# Patient Record
Sex: FEMALE | Race: WHITE | NOT HISPANIC OR LATINO | Employment: FULL TIME
[De-identification: names, ages, dates, MRNs, and addresses within clinical notes are randomized per-mention and may not be internally consistent; named-entity substitution may affect disease eponyms.]

---

## 2015-12-08 LAB
ALT SERPL-CCNC: 37 IU/L (ref 6–31)
AST SERPL-CCNC: 21 IU/L (ref 10–40)
CREAT SERPL-MCNC: 0.81 MG/DL (ref 0.4–1)
GFR SERPL CREATININE-BSD FRML MDRD: >60 ML/MIN/1.73M*2
GLUCOSE SERPL-MCNC: 92 MG/DL (ref 70–100)
POTASSIUM SERPL-SCNC: 4 MEQ/L (ref 3.4–5.1)
TSH SERPL-ACNC: 1.98 (ref 0.4–3.99)

## 2017-06-17 ENCOUNTER — HEALTH MAINTENANCE LETTER (OUTPATIENT)
Age: 37
End: 2017-06-17

## 2018-06-23 ENCOUNTER — HEALTH MAINTENANCE LETTER (OUTPATIENT)
Age: 38
End: 2018-06-23

## 2018-10-26 LAB
ALT SERPL-CCNC: 47 IU/L (ref 6–31)
AST SERPL-CCNC: 28 IU/L (ref 10–40)
CREAT SERPL-MCNC: 0.84 MG/DL (ref 0.4–1)
GFR SERPL CREATININE-BSD FRML MDRD: >60 ML/MIN/1.73M*2
GLUCOSE SERPL-MCNC: 110 MG/DL (ref 70–99)
POTASSIUM SERPL-SCNC: 4.2 MEQ/L (ref 3.4–5.1)
TSH SERPL-ACNC: 2.49 UIU/ML (ref 0.4–3.99)

## 2019-10-01 ENCOUNTER — HEALTH MAINTENANCE LETTER (OUTPATIENT)
Age: 39
End: 2019-10-01

## 2020-07-09 ENCOUNTER — TRANSFERRED RECORDS (OUTPATIENT)
Dept: HEALTH INFORMATION MANAGEMENT | Facility: CLINIC | Age: 40
End: 2020-07-09

## 2020-09-29 NOTE — PROGRESS NOTES
"Subjective     Kayley Pak is a 40 year old female who presents to clinic today for the following health issues:    HPI     New Patient/Transfer of Care    Previous PCP: Dr. 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     She has 1 son, age 15.     She has a boyfriend.     She ran out of Propranolol 1 week ago. Her BP has been elevated since she ran out. She needs a refill today.     She is due for a fasting physical.     Concern - nexplanon implant   Onset: July 9 2020   Description: irregular menses. Heavy bleeding, has gotten golf ball size clots  Intensity: severe  Progression of Symptoms:  intermittent  Accompanying Signs & Symptoms: hasnt bled now for 2 days but it comes back on and off. Sometimes for 6 weeks . Also has cramping  Quite often   Previous history of similar problem: na   Precipitating factors:        Worsened by: na   Alleviating factors:        Improved by: na   Therapies tried and outcome:  iud for 5 years     She been having bilateral thumb pain. Index finger, and middle finger with numbness that started this past week.    Review of Systems   Constitutional, HEENT, cardiovascular, pulmonary, gi and gu systems are negative, except as otherwise noted. +numbness in bilateral thumbs, index, and middle fingers      Objective    BP (!) 152/108   Pulse 102   Temp 99.4  F (37.4  C)   Ht 1.521 m (4' 11.9\")   Wt 115 kg (253 lb 9.6 oz)   SpO2 99%   BMI 49.69 kg/m    Body mass index is 49.69 kg/m .     Physical Exam   GENERAL: healthy, alert and no distress  RESP: lungs clear to auscultation - no rales, rhonchi or wheezes  CV: regular rate and rhythm, normal S1 S2, no S3 or S4, no murmur, click or rub, no peripheral edema and peripheral pulses strong  MS: no gross musculoskeletal defects noted, no edema. CMS and ROM intact to bilateral upper extremities. Bilateral radial pulses +2. +phalen and tinel sign  SKIN: no suspicious lesions or " rashes  NEURO: Normal strength and tone, mentation intact and speech normal  PSYCH: mentation appears normal, affect normal/bright    Results for orders placed or performed in visit on 10/01/20 (from the past 24 hour(s))   CBC with platelets   Result Value Ref Range    WBC 8.0 4.0 - 11.0 10e9/L    RBC Count 4.88 3.8 - 5.2 10e12/L    Hemoglobin 12.9 11.7 - 15.7 g/dL    Hematocrit 44.3 35.0 - 47.0 %    MCV 91 78 - 100 fl    MCH 26.4 (L) 26.5 - 33.0 pg    MCHC 29.1 (L) 31.5 - 36.5 g/dL    RDW 17.2 (H) 10.0 - 15.0 %    Platelet Count 294 150 - 450 10e9/L           Assessment & Plan     Reviewed medical, surgical, and family history. Medications reviewed.     BP elevated, but she ran out of propranolol.       (I10) Essential hypertension  (primary encounter diagnosis)  Comment: RF  Plan: propranolol ER (INDERAL LA) 120 MG 24 hr         capsule            (Z13.9) Screening for condition  Comment: check labs. Genetic form signed  Plan: Factor 5 leiden mutation analysis            (N92.1) Menorrhagia with irregular cycle  Comment: check labs.   Plan: TSH with free T4 reflex, CBC with platelets,         Transferrin, Iron and iron binding capacity            (R53.83) Fatigue, unspecified type  Comment: add iron labs  Plan: CANCELED: Iron and iron binding capacity,         CANCELED: Transferrin            (G56.03) Bilateral carpal tunnel syndrome  Comment: bilateral wrist splints  Plan: Wrist/Arm/Hand Supplies Order for DME - ONLY         FOR DME            (Z76.89) Encounter to establish care  Comment: care established  Plan: as above         Tobacco Cessation:   reports that she has been smoking cigarettes. She started smoking about 21 years ago. She has been smoking about 0.50 packs per day. She has never used smokeless tobacco.  Tobacco Cessation Action Plan: Information offered: Patient not interested at this time      BMI:   Estimated body mass index is 49.69 kg/m  as calculated from the following:    Height as of this  "encounter: 1.521 m (4' 11.9\").    Weight as of this encounter: 115 kg (253 lb 9.6 oz).   Weight management plan: Discussed healthy diet and exercise guidelines         See Patient Instructions    Return in about 1 month (around 11/1/2020), or Fasting physical in 1 month.    Nayla Lund NP  [facility] - [place]    "

## 2020-10-01 ENCOUNTER — OFFICE VISIT (OUTPATIENT)
Dept: FAMILY MEDICINE | Facility: OTHER | Age: 40
End: 2020-10-01
Attending: NURSE PRACTITIONER
Payer: COMMERCIAL

## 2020-10-01 VITALS
TEMPERATURE: 99.4 F | HEIGHT: 60 IN | BODY MASS INDEX: 49.79 KG/M2 | OXYGEN SATURATION: 99 % | DIASTOLIC BLOOD PRESSURE: 108 MMHG | WEIGHT: 253.6 LBS | SYSTOLIC BLOOD PRESSURE: 152 MMHG | HEART RATE: 102 BPM

## 2020-10-01 DIAGNOSIS — I10 ESSENTIAL HYPERTENSION: Primary | ICD-10-CM

## 2020-10-01 DIAGNOSIS — G56.03 BILATERAL CARPAL TUNNEL SYNDROME: ICD-10-CM

## 2020-10-01 DIAGNOSIS — N92.1 MENORRHAGIA WITH IRREGULAR CYCLE: ICD-10-CM

## 2020-10-01 DIAGNOSIS — Z13.9 SCREENING FOR CONDITION: ICD-10-CM

## 2020-10-01 DIAGNOSIS — Z76.89 ENCOUNTER TO ESTABLISH CARE: ICD-10-CM

## 2020-10-01 DIAGNOSIS — R53.83 FATIGUE, UNSPECIFIED TYPE: ICD-10-CM

## 2020-10-01 PROBLEM — F41.0 PANIC ATTACK: Status: ACTIVE | Noted: 2017-12-22

## 2020-10-01 PROBLEM — D22.9 ATYPICAL NEVUS: Status: ACTIVE | Noted: 2017-12-22

## 2020-10-01 PROBLEM — L30.9 ECZEMA, UNSPECIFIED TYPE: Status: ACTIVE | Noted: 2017-12-22

## 2020-10-01 PROBLEM — D22.72: Status: ACTIVE | Noted: 2018-03-02

## 2020-10-01 PROBLEM — F41.1 GAD (GENERALIZED ANXIETY DISORDER): Status: ACTIVE | Noted: 2017-12-22

## 2020-10-01 LAB
ERYTHROCYTE [DISTWIDTH] IN BLOOD BY AUTOMATED COUNT: 17.2 % (ref 10–15)
HCT VFR BLD AUTO: 44.3 % (ref 35–47)
HGB BLD-MCNC: 12.9 G/DL (ref 11.7–15.7)
MCH RBC QN AUTO: 26.4 PG (ref 26.5–33)
MCHC RBC AUTO-ENTMCNC: 29.1 G/DL (ref 31.5–36.5)
MCV RBC AUTO: 91 FL (ref 78–100)
PLATELET # BLD AUTO: 294 10E9/L (ref 150–450)
RBC # BLD AUTO: 4.88 10E12/L (ref 3.8–5.2)
TSH SERPL DL<=0.005 MIU/L-ACNC: 1.49 MU/L (ref 0.4–4)
WBC # BLD AUTO: 8 10E9/L (ref 4–11)

## 2020-10-01 PROCEDURE — 36415 COLL VENOUS BLD VENIPUNCTURE: CPT | Performed by: NURSE PRACTITIONER

## 2020-10-01 PROCEDURE — 84443 ASSAY THYROID STIM HORMONE: CPT | Performed by: NURSE PRACTITIONER

## 2020-10-01 PROCEDURE — 81241 F5 GENE: CPT | Performed by: NURSE PRACTITIONER

## 2020-10-01 PROCEDURE — 85027 COMPLETE CBC AUTOMATED: CPT | Performed by: NURSE PRACTITIONER

## 2020-10-01 PROCEDURE — G0452 MOLECULAR PATHOLOGY INTERPR: HCPCS | Mod: 26 | Performed by: PATHOLOGY

## 2020-10-01 PROCEDURE — 99203 OFFICE O/P NEW LOW 30 MIN: CPT | Performed by: NURSE PRACTITIONER

## 2020-10-01 RX ORDER — DULOXETIN HYDROCHLORIDE 30 MG/1
CAPSULE, DELAYED RELEASE ORAL
COMMUNITY
Start: 2020-08-19 | End: 2021-06-21

## 2020-10-01 RX ORDER — PROPRANOLOL HYDROCHLORIDE 120 MG/1
CAPSULE, EXTENDED RELEASE ORAL
COMMUNITY
Start: 2020-08-21 | End: 2020-10-01

## 2020-10-01 RX ORDER — PLANT STANOL ESTER 450 MG
TABLET ORAL
COMMUNITY
End: 2021-08-25

## 2020-10-01 RX ORDER — PROPRANOLOL HYDROCHLORIDE 120 MG/1
120 CAPSULE, EXTENDED RELEASE ORAL DAILY
Qty: 90 CAPSULE | Refills: 0 | Status: SHIPPED | OUTPATIENT
Start: 2020-10-01 | End: 2021-01-06

## 2020-10-01 RX ORDER — FERROUS SULFATE 325(65) MG
325 TABLET ORAL
COMMUNITY
End: 2021-08-25

## 2020-10-01 ASSESSMENT — ANXIETY QUESTIONNAIRES
GAD7 TOTAL SCORE: 4
2. NOT BEING ABLE TO STOP OR CONTROL WORRYING: NOT AT ALL
IF YOU CHECKED OFF ANY PROBLEMS ON THIS QUESTIONNAIRE, HOW DIFFICULT HAVE THESE PROBLEMS MADE IT FOR YOU TO DO YOUR WORK, TAKE CARE OF THINGS AT HOME, OR GET ALONG WITH OTHER PEOPLE: VERY DIFFICULT
7. FEELING AFRAID AS IF SOMETHING AWFUL MIGHT HAPPEN: NOT AT ALL
5. BEING SO RESTLESS THAT IT IS HARD TO SIT STILL: NOT AT ALL
6. BECOMING EASILY ANNOYED OR IRRITABLE: NOT AT ALL
3. WORRYING TOO MUCH ABOUT DIFFERENT THINGS: NOT AT ALL
1. FEELING NERVOUS, ANXIOUS, OR ON EDGE: MORE THAN HALF THE DAYS

## 2020-10-01 ASSESSMENT — MIFFLIN-ST. JEOR: SCORE: 1740.23

## 2020-10-01 ASSESSMENT — PATIENT HEALTH QUESTIONNAIRE - PHQ9
SUM OF ALL RESPONSES TO PHQ QUESTIONS 1-9: 4
5. POOR APPETITE OR OVEREATING: MORE THAN HALF THE DAYS

## 2020-10-01 ASSESSMENT — PAIN SCALES - GENERAL: PAINLEVEL: NO PAIN (0)

## 2020-10-01 NOTE — NURSING NOTE
"Chief Complaint   Patient presents with     Establish Care       Initial BP (!) 164/108   Pulse 102   Temp 99.4  F (37.4  C)   Ht 1.521 m (4' 11.9\")   Wt 115 kg (253 lb 9.6 oz)   SpO2 99%   BMI 49.69 kg/m   Estimated body mass index is 49.69 kg/m  as calculated from the following:    Height as of this encounter: 1.521 m (4' 11.9\").    Weight as of this encounter: 115 kg (253 lb 9.6 oz).  Medication Reconciliation: complete  Caty Baer  "

## 2020-10-01 NOTE — LETTER
October 9, 2020      Kayley Pak  [address]  [place] [address]        Dear ,    We are writing to inform you of your test results.    Your test results fall within the expected range(s) or remain unchanged from previous results.  Please continue with current treatment plan.    Resulted Orders   TSH with free T4 reflex   Result Value Ref Range    TSH 1.49 0.40 - 4.00 mU/L   Factor 5 leiden mutation analysis   Result Value Ref Range    Copath Report       Patient Name: KAYLEY PAK  MR#: [id number]  Specimen #: [id number]  Collected: 10/1/2020 11:10  Received: 10/2/2020 09:27  Reported: 10/5/2020 17:18  Ordering Phy(s): ISABELL CASTRO  Additional Phy(s): KENDRA CINTRON    For improved result formatting, select 'View Enhanced Report Format' under   Linked Documents section.  _________________________________________    TEST(S) REQUESTED:  Factor 5 Leiden Mutation by PCR    SPECIMEN DESCRIPTION:  BLOOD    METHODOLOGY:   The regions of genomic DNA containing the W2995H Factor V   Leiden gene mutation (Factor V  Leiden) and the Factor 2(Prothrombin [id number]) gene mutation were   simultaneously amplified using the polymerase  chain reaction.  The amplified products were digested with restriction   endonuclease TaqI and products were  analyzed by gel electrophoresis.    RESULTS:    Factor V 1691G>A (Leiden)  RESULTS:  Mutation analyzed:     1691G>A  Factor V 1691G>A (Leiden)  Interpretation:      ABSENT  Factor V 1691G>A (Leiden) mutat ion  genotype:      G/G    INTERPRETATION:  The patient is negative for the Factor V 1691G>A (Leiden) mutation.    COMMENTS:  If a patient is the recipient of an allogeneic bone marrow transplant,   this test must be done on a  pre-transplant sample or buccal swab.  A previous allogeneic bone marrow   transplant will interfere with test  results.  Call the [facility]([phone]) for   instructions on sample collection for these  patients.    This  test was developed and its performance characteristics determined by   [facility]. It has not been cleared or approved by the FDA.   The laboratory is regulated under CLIA  as qualified to perform high-complexity testing. This test is used for   clinical purposes. It should not be  regarded as investigational or for research.    A resident/fellow in an accredited training program was involved in the   selection of testing, review of  laboratory data, and/or interpretation of this case.   I, as the senior   physician, attest that I: (i) confirmed  appropriate testing, (ii) examined the relevant raw data for the   specimen(s); and (iii) rendered or confirmed  the interpretation(s).    Electronically Signed Out By:  Travis Gilliland M.D., [facility]    CPT Codes:  A: 25112-L9JZI, -LSQXPH    TESTING LAB LOCATION:  [address] [address] [phone]    COLLECTION SITE:  Client:  [facility]  Location:  [facility] (B)     CBC with platelets   Result Value Ref Range    WBC 8.0 4.0 - 11.0 10e9/L    RBC Count 4.88 3.8 - 5.2 10e12/L    Hemoglobin 12.9 11.7 - 15.7 g/dL    Hematocrit 44.3 35.0 - 47.0 %    MCV 91 78 - 100 fl    MCH 26.4 (L) 26.5 - 33.0 pg    MCHC 29.1 (L) 31.5 - 36.5 g/dL    RDW 17.2 (H) 10.0 - 15.0 %    Platelet Count 294 150 - 450 10e9/L       If you have any questions or concerns, please call the clinic at the number listed above.       Sincerely,        Nayla Lund NP

## 2020-10-02 ASSESSMENT — ANXIETY QUESTIONNAIRES: GAD7 TOTAL SCORE: 4

## 2020-10-05 LAB — COPATH REPORT: NORMAL

## 2020-11-12 ENCOUNTER — OFFICE VISIT (OUTPATIENT)
Dept: OBGYN | Facility: OTHER | Age: 40
End: 2020-11-12
Attending: OBSTETRICS & GYNECOLOGY
Payer: COMMERCIAL

## 2020-11-12 VITALS
SYSTOLIC BLOOD PRESSURE: 136 MMHG | OXYGEN SATURATION: 98 % | DIASTOLIC BLOOD PRESSURE: 74 MMHG | HEART RATE: 89 BPM | WEIGHT: 253 LBS | BODY MASS INDEX: 49.67 KG/M2 | HEIGHT: 60 IN

## 2020-11-12 DIAGNOSIS — Z12.4 SCREENING FOR CERVICAL CANCER: Primary | ICD-10-CM

## 2020-11-12 DIAGNOSIS — N93.9 ABNORMAL UTERINE BLEEDING: Primary | ICD-10-CM

## 2020-11-12 DIAGNOSIS — N93.9 ABNORMAL UTERINE BLEEDING (AUB): Primary | ICD-10-CM

## 2020-11-12 DIAGNOSIS — E66.01 MORBID OBESITY (H): ICD-10-CM

## 2020-11-12 DIAGNOSIS — N93.9 ABNORMAL UTERINE BLEEDING (AUB): ICD-10-CM

## 2020-11-12 DIAGNOSIS — N93.9 ABNORMAL UTERINE BLEEDING: ICD-10-CM

## 2020-11-12 LAB
ERYTHROCYTE [DISTWIDTH] IN BLOOD BY AUTOMATED COUNT: 15.6 % (ref 10–15)
HCT VFR BLD AUTO: 32 % (ref 35–47)
HGB BLD-MCNC: 10.1 G/DL (ref 11.7–15.7)
MCH RBC QN AUTO: 26.7 PG (ref 26.5–33)
MCHC RBC AUTO-ENTMCNC: 31.6 G/DL (ref 31.5–36.5)
MCV RBC AUTO: 85 FL (ref 78–100)
PLATELET # BLD AUTO: 384 10E9/L (ref 150–450)
RBC # BLD AUTO: 3.78 10E12/L (ref 3.8–5.2)
WBC # BLD AUTO: 9.5 10E9/L (ref 4–11)

## 2020-11-12 PROCEDURE — 83001 ASSAY OF GONADOTROPIN (FSH): CPT | Performed by: OBSTETRICS & GYNECOLOGY

## 2020-11-12 PROCEDURE — 85027 COMPLETE CBC AUTOMATED: CPT | Performed by: OBSTETRICS & GYNECOLOGY

## 2020-11-12 PROCEDURE — 99203 OFFICE O/P NEW LOW 30 MIN: CPT | Performed by: OBSTETRICS & GYNECOLOGY

## 2020-11-12 PROCEDURE — 83002 ASSAY OF GONADOTROPIN (LH): CPT | Performed by: OBSTETRICS & GYNECOLOGY

## 2020-11-12 PROCEDURE — 36415 COLL VENOUS BLD VENIPUNCTURE: CPT | Performed by: OBSTETRICS & GYNECOLOGY

## 2020-11-12 PROCEDURE — 87624 HPV HI-RISK TYP POOLED RSLT: CPT | Performed by: OBSTETRICS & GYNECOLOGY

## 2020-11-12 PROCEDURE — G0123 SCREEN CERV/VAG THIN LAYER: HCPCS | Performed by: OBSTETRICS & GYNECOLOGY

## 2020-11-12 RX ORDER — MEDROXYPROGESTERONE ACETATE 10 MG
10 TABLET ORAL 2 TIMES DAILY
Qty: 60 TABLET | Refills: 0 | Status: SHIPPED | OUTPATIENT
Start: 2020-11-12 | End: 2020-12-12

## 2020-11-12 ASSESSMENT — PAIN SCALES - GENERAL: PAINLEVEL: SEVERE PAIN (7)

## 2020-11-12 ASSESSMENT — MIFFLIN-ST. JEOR: SCORE: 1737.51

## 2020-11-12 NOTE — PROGRESS NOTES
"OUTPATIENT VISIT        CC: ABNORMAL UTERINE BLEEDING FOR 3 WEEKS; NEXPLANON IN PLACE.    HPI;       40  Y.O G 1 P  1001  LMP= 10-  CYCLE HX= MENARCHY AT  12/ IRREGULAR WITHOUT OCP USE/ LMP SEE ABOVE  PATIENT PRESENTS FOR COMPLAINT OF     SHE REPORTS CRAMPING AND 6 PADS DAILY FOR THE LAST 2 1/2 WEEKS  SHE DENIES  FEVER, CHILLS, NAUSEA, EMESIS, DIARRHEA, SOB OR DIZZINESS.  \SHE FEELS TIRED AND RESTARTED HER FE PILLS DUE TO THE LONG CYCLE FLOW.    SHE DENIES TRAVEL OVERSEAS OR TO COVID ENDEMIC REGIONS.  SHE DENIES SYMPTOMS OF PNEUMONIA OR ANOSMIA.    SHE SEES   A PCP AND HAS NEVER HAD AN IRREGULAR PAP SMEAR- HER LAST PAP WAS 8 YRS AGO               OB HX= NVD X1    MED HX= DEPRESSION    HTN    ABNORMAL BLEEDING    TOBACCO USER    OBESE    SURG HX= T&A    KIDNEY STONE    D&C     SOC HX= DAILY TOBACCO USER    FAM HX= HTN AND DM+    ALLERGY= AMOX    SULFA    MEDS= NEXPLANON   PROPRANOLOL   CYMBALTA   FE   K+        ROS= NEG EXCEPT FOR HPI.      PE=  Blood pressure 136/74, pulse 89, height 1.521 m (4' 11.9\"), weight 114.8 kg (253 lb), SpO2 98 %.    AWAKE AND ALERT  \NCAT  HEENT: PERRLA, EOMI  NECK= NO LAD/TM  HEART= RRR NORMAL S1S2  BACK - NO CVA TENDERNESS  LUNGS= CTA BILATERALLY NO WHEEZING OR CRACKLES  ABD= SOFT, APPROP, BS+   NO HS MEGALLY   NONTENDER  = NORMAL EXTERNAL FEMALE GENITALIA   VAG- WELL RUGATED  CX= MULTIPAROUS- SMALL AMOUNT OF BLOOD NOTED, NO ACTIVE BLEEDING OR CLOTS   UT= SMALL MOBLIE - DIFFICULT EXAM DUE TO  HABITUS [BMI>30]   NO OBVIOUS ADNEXAL FULLNESS OR TENDERNESS  PAP COMPLETED  DTR= 1/4 AND EQUAL  EXT=- NO CLUBBING         ASSESSMENT= 40 y.o G 1  LMP 10- WITH ABNORMAL UTERINE BLEEDING- NEXPLANON IN PLACE, OBESITY, NO PAP FOR 8 YEARS.    1. OBESE- EXERCISE COUNSELING   2. AUB- PELVIC USG, FSH, LH, HCG, CBC - START PROVERA 10 PO BID X 60.  3. RTC IN 4 WEEKS FOR IUD PLACEMENT IF NORMAL.  4. CONTINUE FEOSOL   5. PAP COMPLETED.  6. POSSIBLE EM BX IF THICKENED STRIPE.  "

## 2020-11-12 NOTE — NURSING NOTE
"Chief Complaint   Patient presents with     Vaginal Bleeding       Initial /74 (BP Location: Left arm, Patient Position: Sitting, Cuff Size: Adult Large)   Pulse 89   Ht 1.521 m (4' 11.9\")   Wt 114.8 kg (253 lb)   SpO2 98%   BMI 49.58 kg/m   Estimated body mass index is 49.58 kg/m  as calculated from the following:    Height as of this encounter: 1.521 m (4' 11.9\").    Weight as of this encounter: 114.8 kg (253 lb).  Medication Reconciliation: complete     Ana Maria Marshall LPN    "

## 2020-11-13 LAB
FSH SERPL-ACNC: 9.8 IU/L
LH SERPL-ACNC: 5.7 IU/L

## 2020-11-18 ENCOUNTER — HOSPITAL ENCOUNTER (OUTPATIENT)
Dept: ULTRASOUND IMAGING | Facility: HOSPITAL | Age: 40
Discharge: HOME | End: 2020-11-18
Attending: OBSTETRICS & GYNECOLOGY | Admitting: OBSTETRICS & GYNECOLOGY
Payer: COMMERCIAL

## 2020-11-18 DIAGNOSIS — N93.9 ABNORMAL UTERINE BLEEDING: ICD-10-CM

## 2020-11-18 PROCEDURE — 76856 US EXAM PELVIC COMPLETE: CPT

## 2020-11-19 ENCOUNTER — TELEPHONE (OUTPATIENT)
Dept: FAMILY MEDICINE | Facility: OTHER | Age: 40
End: 2020-11-19

## 2020-11-19 LAB
COPATH REPORT: ABNORMAL
PAP: ABNORMAL

## 2020-11-19 NOTE — TELEPHONE ENCOUNTER
Patient is calling looking for lab results from 11/12.  Labs have not been read yet by provider.  Please call her back when results are read.  [phone]

## 2020-11-20 ENCOUNTER — TELEPHONE (OUTPATIENT)
Dept: OBGYN | Facility: OTHER | Age: 40
End: 2020-11-20

## 2020-11-20 LAB
FINAL DIAGNOSIS: NORMAL
HPV HR 12 DNA CVX QL NAA+PROBE: NEGATIVE
HPV16 DNA SPEC QL NAA+PROBE: NEGATIVE
HPV18 DNA SPEC QL NAA+PROBE: NEGATIVE
SPECIMEN DESCRIPTION: NORMAL
SPECIMEN SOURCE CVX/VAG CYTO: NORMAL

## 2020-11-20 NOTE — TELEPHONE ENCOUNTER
Patient saw Dr Gatica on 11/12/2020 and had some lab work done. She is looking for the results. Can you please review results or call her and give them to her? Thank you.

## 2020-11-20 NOTE — PROGRESS NOTES
Called patient about her lab results.  I told her the results including the US.  EC is 3mm, but probable fibroid adjacent to lining.      Explained that she needs to     Take her medication as ordered which is Provera 10mg BID  Take her iron as ordered.    Needs Colposcopy examination for ASCUS.

## 2020-12-28 ENCOUNTER — TELEPHONE (OUTPATIENT)
Dept: FAMILY MEDICINE | Facility: OTHER | Age: 40
End: 2020-12-28

## 2020-12-28 NOTE — TELEPHONE ENCOUNTER
Called and left patient message about some fmla forms that were in [place] mail box at the [facility]. Unsure of what condition she is asking for FMLA for and for how long , ect.  She will probably need an appointment to be seen since she has only seen provider for an est. Care apt. On 10/1/2020.     KEELY Byrne  Awaiting call back. Will also try to reach her via [employer].

## 2020-12-29 NOTE — TELEPHONE ENCOUNTER
Patient needs to be seen for her forms to be filled out for work. Please try to reach her to have her scheduled to be seen by pcp.     KEELY Byrne

## 2020-12-29 NOTE — TELEPHONE ENCOUNTER
Called 12/29/20  to schedule appointment with PCP Nayla Lund NP to fill out forms for work  Aliza Hill

## 2021-01-06 ENCOUNTER — TELEPHONE (OUTPATIENT)
Dept: FAMILY MEDICINE | Facility: OTHER | Age: 41
End: 2021-01-06

## 2021-01-06 DIAGNOSIS — I10 ESSENTIAL HYPERTENSION: ICD-10-CM

## 2021-01-06 RX ORDER — MEDROXYPROGESTERONE ACETATE 150 MG/ML
150 INJECTION, SUSPENSION INTRAMUSCULAR
Status: CANCELLED | OUTPATIENT
Start: 2021-01-06 | End: 2022-01-01

## 2021-01-06 RX ORDER — PROPRANOLOL HYDROCHLORIDE 120 MG/1
CAPSULE, EXTENDED RELEASE ORAL
Qty: 90 CAPSULE | Refills: 0 | Status: SHIPPED | OUTPATIENT
Start: 2021-01-06 | End: 2021-04-07

## 2021-01-06 NOTE — TELEPHONE ENCOUNTER
Patient left a message on my phone about fmla forms . I do have the forms she stats they are about conditions that she seen obgyn for . And other medical issues.  She needs refills on her proprolnolol, and metroxaprogesterone.     I called patient back , no answer . Left her a voicemail stating she NEEDs an apt in order for these forms to be filled out or she may even need the OBGYN to fill them out for her since pcp has not seen her for these issues. Also let her know I send the requests for refills to pcp . Unsure if she will fill the metroxaprogesterone because she has  Not seen her for this or filled before. Left her the scheduling number to schedule apt.     KEELY Byrne

## 2021-01-13 DIAGNOSIS — N93.9 ABNORMAL UTERINE BLEEDING (AUB): Primary | ICD-10-CM

## 2021-01-13 RX ORDER — MEDROXYPROGESTERONE ACETATE 10 MG
10 TABLET ORAL DAILY
Qty: 60 TABLET | Refills: 0 | Status: SHIPPED | OUTPATIENT
Start: 2021-01-13 | End: 2021-04-07

## 2021-01-13 NOTE — TELEPHONE ENCOUNTER
Provera       Last Written Prescription Date:  11/12/2020  Last Fill Quantity: 60,   # refills: 0  Last Office Visit: 11/12/2020  Future Office visit:    Next 5 appointments (look out 90 days)    Jan 29, 2021 10:15 AM  (Arrive by 10:00 AM)  SHORT with Nayla Lund NP  [facility] [address]  [facility] [place] [address]  [phone]

## 2021-01-15 ENCOUNTER — HEALTH MAINTENANCE LETTER (OUTPATIENT)
Age: 41
End: 2021-01-15

## 2021-04-05 NOTE — PROGRESS NOTES
"    Assessment & Plan       (I10) Essential hypertension  Comment: increase propranolol to 160 mg and recheck back in 1 month  Plan: propranolol ER (INDERAL LA) 160 MG 24 hr         capsule            (N93.9) Abnormal uterine bleeding (AUB)  Comment: RF  Plan: medroxyPROGESTERone (PROVERA) 10 MG tablet               BMI:   Estimated body mass index is 52.3 kg/m  as calculated from the following:    Height as of 11/12/20: 1.521 m (4' 11.9\").    Weight as of this encounter: 121.1 kg (266 lb 14.4 oz).   Weight management plan: Discussed healthy diet and exercise guidelines    See Patient Instructions    Return in about 1 month (around 5/7/2021) for Fasting physical and BP check.    Nayla Lund NP  [facility]   Kayley is a 40 year old who presents for the following health issues     HPI     Hypertension Follow-up      Do you check your blood pressure regularly outside of the clinic? No     Are you following a low salt diet? No    Are your blood pressures ever more than 140 on the top number (systolic) OR more   than 90 on the bottom number (diastolic), for example 140/90? Yes      How many servings of fruits and vegetables do you eat daily?  2-3    On average, how many sweetened beverages do you drink each day (Examples: soda, juice, sweet tea, etc.  Do NOT count diet or artificially sweetened beverages)?   2    How many days per week do you exercise enough to make your heart beat faster? 3 or less    How many minutes a day do you exercise enough to make your heart beat faster? 9 or less    How many days per week do you miss taking your medication? 0    Medication Followup of propranolol  MG    Taking Medication as prescribed: yes    Side Effects:  None    Medication Helping Symptoms: Yes, but might consider something different ??       Review of Systems   Constitutional, HEENT, cardiovascular, pulmonary, gi and gu systems are negative, except as otherwise noted.    "   Objective    BP (!) 160/100 (BP Location: Right arm, Patient Position: Chair, Cuff Size: Adult Large)   Pulse 99   Temp 97.6  F (36.4  C) (Tympanic)   Resp 16   Wt 121.1 kg (266 lb 14.4 oz)   SpO2 97%   BMI 52.30 kg/m    Body mass index is 52.3 kg/m . BP recheck 148/98 left arm (she hasn't taken her medication yet today)  Physical Exam   GENERAL: healthy, alert and no distress  NECK: no adenopathy, no asymmetry, masses, or scars and thyroid normal to palpation  RESP: lungs clear to auscultation - no rales, rhonchi or wheezes  CV: regular rate and rhythm, normal S1 S2, no S3 or S4, no murmur, click or rub, no peripheral edema and peripheral pulses strong  MS: no gross musculoskeletal defects noted, no edema  SKIN: no suspicious lesions or rashes  PSYCH: mentation appears normal, affect normal/bright

## 2021-04-07 ENCOUNTER — OFFICE VISIT (OUTPATIENT)
Dept: FAMILY MEDICINE | Facility: OTHER | Age: 41
End: 2021-04-07
Attending: NURSE PRACTITIONER
Payer: COMMERCIAL

## 2021-04-07 VITALS
WEIGHT: 266.9 LBS | BODY MASS INDEX: 52.3 KG/M2 | RESPIRATION RATE: 16 BRPM | HEART RATE: 99 BPM | SYSTOLIC BLOOD PRESSURE: 160 MMHG | DIASTOLIC BLOOD PRESSURE: 100 MMHG | TEMPERATURE: 97.6 F | OXYGEN SATURATION: 97 %

## 2021-04-07 DIAGNOSIS — I10 ESSENTIAL HYPERTENSION: ICD-10-CM

## 2021-04-07 DIAGNOSIS — N93.9 ABNORMAL UTERINE BLEEDING (AUB): ICD-10-CM

## 2021-04-07 PROCEDURE — 99213 OFFICE O/P EST LOW 20 MIN: CPT | Performed by: NURSE PRACTITIONER

## 2021-04-07 RX ORDER — PROPRANOLOL HYDROCHLORIDE 160 MG/1
160 CAPSULE, EXTENDED RELEASE ORAL DAILY
Qty: 90 CAPSULE | Refills: 0 | Status: SHIPPED | OUTPATIENT
Start: 2021-04-07 | End: 2021-07-07

## 2021-04-07 RX ORDER — MEDROXYPROGESTERONE ACETATE 10 MG
10 TABLET ORAL DAILY
Qty: 90 TABLET | Refills: 0 | Status: SHIPPED | OUTPATIENT
Start: 2021-04-07 | End: 2021-07-07

## 2021-04-07 ASSESSMENT — PAIN SCALES - GENERAL: PAINLEVEL: NO PAIN (0)

## 2021-04-07 NOTE — NURSING NOTE
"Chief Complaint   Patient presents with     Hypertension     Recheck Medication       Initial BP (!) 160/100 (BP Location: Right arm, Patient Position: Chair, Cuff Size: Adult Large)   Pulse 99   Temp 97.6  F (36.4  C) (Tympanic)   Resp 16   Wt 121.1 kg (266 lb 14.4 oz)   SpO2 97%   BMI 52.30 kg/m   Estimated body mass index is 52.3 kg/m  as calculated from the following:    Height as of 11/12/20: 1.521 m (4' 11.9\").    Weight as of this encounter: 121.1 kg (266 lb 14.4 oz).  Medication Reconciliation: complete  Karishma Vargas LPN  "

## 2021-04-07 NOTE — PATIENT INSTRUCTIONS
Patient Education     Established High Blood Pressure    High blood pressure (hypertension) is a long-term (chronic) disease. Often healthcare providers don t know what causes it. But it can be caused by certain health conditions and medicines.  If you have high blood pressure, you may not have any symptoms. If you do have symptoms, they may include:    Headache    Dizziness    Changes in your vision    Chest pain    Shortness of breath  But even without symptoms, high blood pressure that s not treated raises your risk for heart attack, heart failure, kidney disease, and stroke. High blood pressure is a serious health risk and shouldn t be ignored.  Blood pressure measurements are given as 2 numbers. Systolic blood pressure is the upper number. This is the pressure when the heart contracts. Diastolic blood pressure is the lower number. This is the pressure when the heart relaxes between beats. You will see your blood pressure readings written together. For example, a person with a systolic pressure of 118 and a diastolic pressure of 78 will have 118/78 written in the medical record.  Blood pressure is classified as normal, raised (elevated) or stage 1 or stage 2 high blood pressure:    Normal blood pressure. Systolic of less than 120 and diastolic of less than 80 (120/80).    Elevated blood pressure. Systolic of 120 to 129 and diastolic less than 80.    Stage 1 high blood pressure. Systolic is 130 to 139 or diastolic between 80 to 89.    Stage 2 high blood pressure. Systolic is 140 or higher or the diastolic is 90 or higher.  Home care  If you have high blood pressure, follow these home care guidelines to help lower your blood pressure. If you are taking medicines for high blood pressure, these methods may reduce or end your need for medicines in the future.    Start a weight-loss program if you are overweight.    Cut back on how much salt you get in your diet. Here s how to do this:  ? Don t eat foods that have a  lot of salt. These include olives, pickles, smoked meats, and salted potato chips.  ? Don t add salt to your food at the table.  ? Use only small amounts of salt when cooking.    Start an exercise program. Talk with your healthcare provider about the type of exercise program that would be best for you. It doesn't have to be hard. Even brisk walking for 20 minutes 3 times a week is a good form of exercise.    Don t take medicines that stimulate the heart. This includes many over-the-counter cold and sinus decongestant pills and sprays, as well as diet pills. Check the warnings about high blood pressure on the label. Before buying any over-the-counter medicines or supplements, always ask the pharmacist about the product's possible interaction with your high blood pressure and your high blood pressure medicines.    Stimulants such as amphetamine or cocaine could be deadly for someone with high blood pressure. Never take these.    Limit how much caffeine you get in your diet. Switch to caffeine-free products.    Stop smoking. If you are a long-time smoker, this can be hard. Talk with your healthcare provider about medicines and nicotine replacement options to help you. Also join a stop-smoking program . This makes it more likely that you will quit for good.    Learn how to handle stress. This is an important part of any program to lower blood pressure. Learn about relaxation methods such as meditation, yoga, or biofeedback.    If your provider prescribed medicines, take them exactly as directed. Missing doses may cause your blood pressure get out of control.    If you miss a dose, check with your healthcare provider or pharmacist about what to do.    Think about buying an automatic blood pressure machine to check your blood pressure at home. Ask your provider for a recommendation. You can get one of these at most pharmacies.  Using a home blood pressure monitor  The American Heart Association advises the following  guidelines for home blood pressure monitoring:    Don't smoke or drink coffee for 30 minutes before taking your blood pressure.    Go to the bathroom before the test.    Relax for 5 minutes before taking the measurement.    Sit with your back supported (don't sit on a couch or soft chair). Keep your feet on the floor uncrossed. Place your arm on a solid flat surface (such as a table) with the upper part of the arm at heart level. Place the middle of the cuff directly above the bend of the elbow. Check the monitor's instruction manual for an illustration.    Take multiple readings. When you measure, take 2 to 3 readings one minute apart and record all of the results.    Take your blood pressure at the same time every day, or as your healthcare provider advises.    Record the date, time, and blood pressure reading.    Take the record with you to your next healthcare appointment. If your blood pressure monitor has a built-in memory, just take the monitor with you to your next appointment.    Call your provider if you have several high readings. Don't be frightened by one high blood pressure reading. But if you get a few high readings, check in with your healthcare provider.  Follow-up care  You will need to see your healthcare provider regularly. This is to check your blood pressure and to make changes to your medicines. Make a follow-up appointment as directed. Bring the record of your home blood pressure readings to the appointment.  Call 911  Call 911 if you have any of these:    Blood pressure of 180/120 or higher    Chest pain or shortness of breath    Weakness of an arm or leg or one side of the face    Problems speaking or seeing     When to get medical advice  Call your healthcare provider right away if any of these occur:    Severe headache    Throbbing or rushing sound in the ears    Nosebleed    Sudden severe pain in your belly (abdomen)    Extreme drowsiness, confusion, or fainting    Dizziness or spinning  feeling (vertigo)  [employer] last reviewed this educational content on 7/1/2019 2000-2020 [employer]. All rights reserved. This information is not intended as a substitute for professional medical care. Always follow your healthcare professional's instructions.    Increase Propranolol to 160 mg daily.   Follow up in 1 month.

## 2021-06-20 NOTE — PROGRESS NOTES
Assessment & Plan        Panic attack  - DULoxetine (CYMBALTA) 60 MG capsule; Take 1 capsule (60 mg) by mouth daily    LISA (generalized anxiety disorder)  - DULoxetine (CYMBALTA) 60 MG capsule; Take 1 capsule (60 mg) by mouth daily    Essential hypertension  - Continue Proprranolol  - losartan (COZAAR) 50 MG tablet; Take 1 tablet (50 mg) by mouth daily    Tobacco abuse  - nicotine (NICODERM CQ) 21 MG/24HR 24 hr patch; Place 1 patch onto the skin every 24 hours  - nicotine (NICODERM CQ) 14 MG/24HR 24 hr patch; Place 1 patch onto the skin every 24 hours  - nicotine (NICODERM CQ) 7 MG/24HR 24 hr patch; Place 1 patch onto the skin every 24 hours      Return 1 month FU with her PCP - panic attacks, anxiety, HTN.      Smiley Rubio, [facility] - [place] Z[place]ar is a 40 year old who presents for the following health issues         Anxiety - Panic attack Follow-Up    How are you doing with your anxiety since your last visit? Worsened     Are you having other symptoms that might be associated with anxiety? Yes:  states has been having panic attacks     Have you had a significant life event? No     Are you feeling depressed? Yes:  see flowsheet    Do you have any concerns with your use of alcohol or other drugs? No         First panic attack was 16 years ago. She has had them ever since. She had good stretches without needing medication, then the attacks have returned 5 years ago. She was started on medication about 5 years ago and she has been okay until recently. She has tried a couple different medications. Cymbalta has worked the best because she has the least side effects. In the last 5 months she has been having panic attacks a couple times a week. She misses work related to these panic attacks. Patient denies any major life changes. She lives with her boyfriend and son, patient reports no problems. Patient is safe in her relationships.         Social History     Tobacco Use     Smoking status:  Current Every Day Smoker     Packs/day: 0.50     Types: Cigarettes     Start date: 1999     Smokeless tobacco: Never Used     Tobacco comment: half a pack a day    Substance Use Topics     Alcohol use: Yes     Comment: once a month     Drug use: No         LISA-7 SCORE 10/1/2020 6/21/2021   Total Score 4 13       PHQ 10/1/2020 6/21/2021   PHQ-9 Total Score 4 12   Q9: Thoughts of better off dead/self-harm past 2 weeks Not at all Not at all         Patient Active Problem List   Diagnosis     Family history of diabetes mellitus     Seasonal allergic rhinitis     CARDIOVASCULAR SCREENING; LDL GOAL LESS THAN 160     Dyslipidemia     Atypical nevus     Atypical nevus of foot, left     Contraceptive management     Eczema, unspecified type     Essential hypertension     LISA (generalized anxiety disorder)     Herpesvirus 2     Panic attack     Pes anserine bursitis     Right knee pain     Morbid obesity (H)     Tobacco abuse     Past Surgical History:   Procedure Laterality Date     IR RENAL STONE REMOVAL RIGHT  2010     miscarriage  1999    Age 19 D & C.     mole removed  2019    Removed in between toes on right foot betwwen 2nd and 3rd toe     TONSILLECTOMY, ADENOIDECTOMY, COMBINED  1993       Social History     Tobacco Use     Smoking status: Current Every Day Smoker     Packs/day: 0.50     Types: Cigarettes     Start date: 1999     Smokeless tobacco: Never Used     Tobacco comment: half a pack a day    Substance Use Topics     Alcohol use: Yes     Comment: once a month     Family History   Problem Relation Age of Onset     Migraines Mother      Hypertension Mother      Alcoholism Father      Mental Illness Father      Lupus Paternal Aunt      Hypertension Maternal Grandmother      Factor V Leiden deficiency Maternal Grandfather      Diabetes Type 2  Maternal Grandfather      Hypertension Paternal Grandmother      Cancer - colorectal Paternal Grandmother      Cancer Paternal Grandfather         stomach     Lupus Paternal  Grandfather      Factor V Leiden deficiency Maternal Uncle      Heart Disease Maternal Uncle         Multiple heart attacks     Factor V Leiden deficiency Maternal Aunt      Factor V Leiden deficiency Maternal Uncle      Breast Cancer Cousin      Lung Cancer Cousin         also foot cancer           Current Outpatient Medications   Medication Sig Dispense Refill     DULoxetine (CYMBALTA) 30 MG capsule TAKE 1 CAPSULE BY MOUTH EVERY DAY. DO NOT CRUSH       etonogestrel (NEXPLANON) 68 MG IMPL Inject 68 mg Subcutaneous Inserted 7/9/2020       medroxyPROGESTERone (PROVERA) 10 MG tablet Take 1 tablet (10 mg) by mouth daily 90 tablet 0     propranolol ER (INDERAL LA) 160 MG 24 hr capsule Take 1 capsule (160 mg) by mouth daily 90 capsule 0     ferrous sulfate (FEROSUL) 325 (65 Fe) MG tablet Take 325 mg by mouth daily (with breakfast)       potassium gluconate 2.5 MEQ tablet          Allergies   Allergen Reactions     Amoxicillin      Sulfa Drugs        Recent Labs   Lab Test 10/01/20  1110 10/26/18 12/08/15   ALT  --  47* 37*   CR  --  0.84 0.81   GFRESTIMATED  --  >60 >60   POTASSIUM  --  4.2 4.0   TSH 1.49 2.49 1.98        BP Readings from Last 3 Encounters:   06/21/21 (!) 154/100   04/07/21 (!) 160/100   11/12/20 136/74    Wt Readings from Last 3 Encounters:   06/21/21 123.8 kg (273 lb)   04/07/21 121.1 kg (266 lb 14.4 oz)   11/12/20 114.8 kg (253 lb)              Review of Systems   Respiratory: No shortness of breath at rest, positive for shortness of breath with panic attacks, positive for dyspnea on exertion, positive for cough related to allergies.   Cardiovascular: negative for, palpitations, chest pain, orthopnea, lower extremity edema and syncope or near-syncope. When patient has her panic attacks- she feels tightness in chest muscles, racing heart rate, sweating, and tingling sensation in hands bilaterally.   Musculoskeletal: negative for, back pain and joint pain  Neurologic: positive for intermittent headaches  "and positive for numbness and tingling of hands bilaterally with panic attacks  Psychiatric: positive for anxiety, \"anxiety makes me depressed\", negative for illegal drug usage  Hematologic/Lymphatic/Immunologic: Positive for allergies, Negative for chills, fever and night sweats  Endocrine:negative for cold intolerance and heat intolerance, negative for thyroid disorders          Objective    BP (!) 154/100 (BP Location: Left arm, Patient Position: Chair, Cuff Size: Adult Large)   Pulse 72   Temp 99  F (37.2  C) (Tympanic)   Ht 1.521 m (4' 11.9\")   Wt 123.8 kg (273 lb)   SpO2 98%   BMI 53.49 kg/m    Body mass index is 53.49 kg/m .       Physical Exam   GENERAL: healthy, alert and no distress  NECK: no adenopathy, no asymmetry, masses, or scars and thyroid normal to palpation  RESP: lungs clear to auscultation - no rales, rhonchi or wheezes  CV: regular rate and rhythm, normal S1 S2, no murmur, click or rub, no peripheral edema and peripheral pulses strong  NEURO: Normal strength and tone, mentation intact and speech normal  PSYCH: mentation appears normal, affect normal/bright              "

## 2021-06-21 ENCOUNTER — OFFICE VISIT (OUTPATIENT)
Dept: FAMILY MEDICINE | Facility: OTHER | Age: 41
End: 2021-06-21
Attending: NURSE PRACTITIONER
Payer: COMMERCIAL

## 2021-06-21 VITALS
BODY MASS INDEX: 53.6 KG/M2 | SYSTOLIC BLOOD PRESSURE: 154 MMHG | HEIGHT: 60 IN | HEART RATE: 72 BPM | DIASTOLIC BLOOD PRESSURE: 100 MMHG | OXYGEN SATURATION: 98 % | TEMPERATURE: 99 F | WEIGHT: 273 LBS

## 2021-06-21 DIAGNOSIS — F41.0 PANIC ATTACK: Primary | ICD-10-CM

## 2021-06-21 DIAGNOSIS — I10 ESSENTIAL HYPERTENSION: ICD-10-CM

## 2021-06-21 DIAGNOSIS — F41.1 GAD (GENERALIZED ANXIETY DISORDER): ICD-10-CM

## 2021-06-21 DIAGNOSIS — Z72.0 TOBACCO ABUSE: ICD-10-CM

## 2021-06-21 PROCEDURE — 99214 OFFICE O/P EST MOD 30 MIN: CPT | Performed by: NURSE PRACTITIONER

## 2021-06-21 RX ORDER — NICOTINE 21 MG/24HR
1 PATCH, TRANSDERMAL 24 HOURS TRANSDERMAL EVERY 24 HOURS
Qty: 30 PATCH | Refills: 0 | Status: SHIPPED | OUTPATIENT
Start: 2021-06-21 | End: 2023-10-26

## 2021-06-21 RX ORDER — DULOXETIN HYDROCHLORIDE 60 MG/1
60 CAPSULE, DELAYED RELEASE ORAL DAILY
Qty: 30 CAPSULE | Refills: 1 | Status: SHIPPED | OUTPATIENT
Start: 2021-06-21 | End: 2021-07-21

## 2021-06-21 RX ORDER — LOSARTAN POTASSIUM 50 MG/1
50 TABLET ORAL DAILY
Qty: 30 TABLET | Refills: 1 | Status: SHIPPED | OUTPATIENT
Start: 2021-06-21 | End: 2021-07-21

## 2021-06-21 ASSESSMENT — ANXIETY QUESTIONNAIRES
7. FEELING AFRAID AS IF SOMETHING AWFUL MIGHT HAPPEN: MORE THAN HALF THE DAYS
2. NOT BEING ABLE TO STOP OR CONTROL WORRYING: MORE THAN HALF THE DAYS
IF YOU CHECKED OFF ANY PROBLEMS ON THIS QUESTIONNAIRE, HOW DIFFICULT HAVE THESE PROBLEMS MADE IT FOR YOU TO DO YOUR WORK, TAKE CARE OF THINGS AT HOME, OR GET ALONG WITH OTHER PEOPLE: VERY DIFFICULT
1. FEELING NERVOUS, ANXIOUS, OR ON EDGE: MORE THAN HALF THE DAYS
4. TROUBLE RELAXING: MORE THAN HALF THE DAYS
3. WORRYING TOO MUCH ABOUT DIFFERENT THINGS: MORE THAN HALF THE DAYS
5. BEING SO RESTLESS THAT IT IS HARD TO SIT STILL: MORE THAN HALF THE DAYS
GAD7 TOTAL SCORE: 13
6. BECOMING EASILY ANNOYED OR IRRITABLE: SEVERAL DAYS

## 2021-06-21 ASSESSMENT — MIFFLIN-ST. JEOR: SCORE: 1828.23

## 2021-06-21 ASSESSMENT — PATIENT HEALTH QUESTIONNAIRE - PHQ9: SUM OF ALL RESPONSES TO PHQ QUESTIONS 1-9: 12

## 2021-06-21 ASSESSMENT — PAIN SCALES - GENERAL: PAINLEVEL: NO PAIN (0)

## 2021-06-21 NOTE — PATIENT INSTRUCTIONS
Assessment & Plan        Panic attack  - DULoxetine (CYMBALTA) 60 MG capsule; Take 1 capsule (60 mg) by mouth daily    LISA (generalized anxiety disorder)  - DULoxetine (CYMBALTA) 60 MG capsule; Take 1 capsule (60 mg) by mouth daily    Essential hypertension  - Continue Proprranolol  - losartan (COZAAR) 50 MG tablet; Take 1 tablet (50 mg) by mouth daily  - BP check in 2 weeks - nurse only    Tobacco abuse  - nicotine (NICODERM CQ) 21 MG/24HR 24 hr patch; Place 1 patch onto the skin every 24 hours  - nicotine (NICODERM CQ) 14 MG/24HR 24 hr patch; Place 1 patch onto the skin every 24 hours  - nicotine (NICODERM CQ) 7 MG/24HR 24 hr patch; Place 1 patch onto the skin every 24 hours      Return 1 month FU with her PCP - panic attacks, anxiety, HTN.      Smiley Rubio CNP  [facility] - [place] [facility]

## 2021-06-21 NOTE — NURSING NOTE
"Chief Complaint   Patient presents with     Anxiety       Initial BP (!) 154/100 (BP Location: Left arm, Patient Position: Chair, Cuff Size: Adult Large)   Pulse 72   Temp 99  F (37.2  C) (Tympanic)   Ht 1.521 m (4' 11.9\")   Wt 123.8 kg (273 lb)   SpO2 98%   BMI 53.49 kg/m   Estimated body mass index is 53.49 kg/m  as calculated from the following:    Height as of this encounter: 1.521 m (4' 11.9\").    Weight as of this encounter: 123.8 kg (273 lb).  Medication Reconciliation: complete  Babs Lundberg LPN    "

## 2021-06-22 ASSESSMENT — ANXIETY QUESTIONNAIRES: GAD7 TOTAL SCORE: 13

## 2021-07-06 ENCOUNTER — OFFICE VISIT (OUTPATIENT)
Dept: PSYCHOLOGY | Facility: OTHER | Age: 41
End: 2021-07-06
Attending: COUNSELOR
Payer: COMMERCIAL

## 2021-07-06 DIAGNOSIS — F41.0 PANIC DISORDER: ICD-10-CM

## 2021-07-06 DIAGNOSIS — F41.1 GAD (GENERALIZED ANXIETY DISORDER): Primary | ICD-10-CM

## 2021-07-06 DIAGNOSIS — N93.9 ABNORMAL UTERINE BLEEDING (AUB): ICD-10-CM

## 2021-07-06 DIAGNOSIS — I10 ESSENTIAL HYPERTENSION: ICD-10-CM

## 2021-07-06 PROCEDURE — 90791 PSYCH DIAGNOSTIC EVALUATION: CPT | Performed by: COUNSELOR

## 2021-07-06 ASSESSMENT — ANXIETY QUESTIONNAIRES
6. BECOMING EASILY ANNOYED OR IRRITABLE: SEVERAL DAYS
GAD7 TOTAL SCORE: 18
IF YOU CHECKED OFF ANY PROBLEMS ON THIS QUESTIONNAIRE, HOW DIFFICULT HAVE THESE PROBLEMS MADE IT FOR YOU TO DO YOUR WORK, TAKE CARE OF THINGS AT HOME, OR GET ALONG WITH OTHER PEOPLE: EXTREMELY DIFFICULT
1. FEELING NERVOUS, ANXIOUS, OR ON EDGE: NEARLY EVERY DAY
3. WORRYING TOO MUCH ABOUT DIFFERENT THINGS: NEARLY EVERY DAY
2. NOT BEING ABLE TO STOP OR CONTROL WORRYING: NEARLY EVERY DAY
5. BEING SO RESTLESS THAT IT IS HARD TO SIT STILL: MORE THAN HALF THE DAYS
7. FEELING AFRAID AS IF SOMETHING AWFUL MIGHT HAPPEN: NEARLY EVERY DAY

## 2021-07-06 ASSESSMENT — PATIENT HEALTH QUESTIONNAIRE - PHQ9
5. POOR APPETITE OR OVEREATING: NEARLY EVERY DAY
SUM OF ALL RESPONSES TO PHQ QUESTIONS 1-9: 14
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
## 2021-07-07 ENCOUNTER — TELEPHONE (OUTPATIENT)
Dept: FAMILY MEDICINE | Facility: OTHER | Age: 41
End: 2021-07-07
Payer: COMMERCIAL

## 2021-07-07 RX ORDER — MEDROXYPROGESTERONE ACETATE 10 MG
TABLET ORAL
Qty: 30 TABLET | Refills: 0 | Status: SHIPPED | OUTPATIENT
Start: 2021-07-07 | End: 2021-08-25

## 2021-07-07 RX ORDER — PROPRANOLOL HYDROCHLORIDE 160 MG/1
CAPSULE, EXTENDED RELEASE ORAL
Qty: 30 CAPSULE | Refills: 0 | Status: SHIPPED | OUTPATIENT
Start: 2021-07-07 | End: 2021-07-12

## 2021-07-07 ASSESSMENT — ANXIETY QUESTIONNAIRES: GAD7 TOTAL SCORE: 18

## 2021-07-07 NOTE — TELEPHONE ENCOUNTER
Provera       Last Written Prescription Date:  4/7/2021  Last Fill Quantity: 90,   # refills: 0    Inderal      Last Written Prescription Date:  4/7/2021  Last Fill Quantity: 90,   # refills: 0  Last Office Visit: 6/21/2021  Future Office visit:    Next 5 appointments (look out 90 days)    Jul 12, 2021  8:45 AM  (Arrive by 8:30 AM)  SHORT with [facility] NURSE  [facility] [place] ([facility] [place] ) [address]  [place] [address]  [phone]   Jul 12, 2021  2:00 PM  (Arrive by 1:45 PM)  Return Visit with SHANIKA Pike  [facility] ([facility] ) [address] [place] [id number] [phone]   Jul 21, 2021  9:15 AM  (Arrive by 9:00 AM)  SHORT with Nayla Lund NP  [facility] ([facility] ) [address]  [facility] [address]  [phone]   Jul 22, 2021 11:00 AM  (Arrive by 10:45 AM)  Return Visit with SHANIKA Pike  [facility] [place] ([facility] [place] ) [address] [place] [address]  [place] [id number] [phone]   Jul 29, 2021 11:00 AM  (Arrive by 10:45 AM)  Return Visit with Patricia Og [facility] [place] ([facility] [place] ) [address] [place] [address]  [place] [id number] [phone]

## 2021-07-07 NOTE — TELEPHONE ENCOUNTER
To: Nurse to Patricia Og  Inquiring on the medical status of patient, please use reference number [id number] when returning phone call.  Thank you

## 2021-07-12 ENCOUNTER — OFFICE VISIT (OUTPATIENT)
Dept: PSYCHOLOGY | Facility: OTHER | Age: 41
End: 2021-07-12
Attending: COUNSELOR
Payer: COMMERCIAL

## 2021-07-12 ENCOUNTER — OFFICE VISIT (OUTPATIENT)
Dept: FAMILY MEDICINE | Facility: OTHER | Age: 41
End: 2021-07-12
Attending: NURSE PRACTITIONER
Payer: COMMERCIAL

## 2021-07-12 VITALS — SYSTOLIC BLOOD PRESSURE: 162 MMHG | OXYGEN SATURATION: 98 % | DIASTOLIC BLOOD PRESSURE: 100 MMHG | HEART RATE: 95 BPM

## 2021-07-12 DIAGNOSIS — F41.1 GAD (GENERALIZED ANXIETY DISORDER): Primary | ICD-10-CM

## 2021-07-12 DIAGNOSIS — F41.0 PANIC DISORDER: ICD-10-CM

## 2021-07-12 DIAGNOSIS — I10 ESSENTIAL HYPERTENSION: ICD-10-CM

## 2021-07-12 DIAGNOSIS — I10 ESSENTIAL HYPERTENSION: Primary | ICD-10-CM

## 2021-07-12 PROCEDURE — 99207 PR NO CHARGE NURSE ONLY: CPT

## 2021-07-12 PROCEDURE — 90837 PSYTX W PT 60 MINUTES: CPT | Performed by: COUNSELOR

## 2021-07-12 RX ORDER — PROPRANOLOL HYDROCHLORIDE 160 MG/1
160 CAPSULE, EXTENDED RELEASE ORAL DAILY
Qty: 30 CAPSULE | Refills: 1 | Status: SHIPPED | OUTPATIENT
Start: 2021-07-12 | End: 2021-12-03

## 2021-07-12 ASSESSMENT — PAIN SCALES - GENERAL: PAINLEVEL: NO PAIN (0)

## 2021-07-12 NOTE — PROGRESS NOTES
"The author of this note documented a reason for not sharing it with the patient.    Counseling Progress Note    Client Name: Kayley Pak    Date of Service (when I saw the patient): 07/12/2021    Service Type: Individual Thesisi    Session Start Time: 11:00am  Session End Time: 12:00pm  Session Length: I spent 53+ minutes performing psychotherapy during this office visit.  Session Number: 2    Attendees: Client     Health Maintenance Topics with due status: Overdue       Topic Date Due    ADVANCE CARE PLANNING Never done    Pneumococcal Vaccine: Pediatrics (0 to 5 Years) and At-Risk Patients (6 to 64 Years) Never done    COVID-19 Vaccine Never done    DTAP/TDAP/TD IMMUNIZATION 08/25/2008     Health Maintenance Topics with due status: Due Soon       Topic Date Due    HPV TEST 11/12/2021    PAP 11/12/2021       DSM5 Diagnoses:  300.01 (F41.0) Panic Disorder  300.02 (F41.1) Generalized Anxiety Disorder    Treatment Plan Due Date: Developed 7/12/21, will review and sign at next appointment  Diagnostic Assessment Update - Due Date: 7/5/2022    DATA    Treatment Objective(s) Addressed in This Session: Decrease worries, better manage panic attacks, improve physical health, increase relaxation time and ability    Progress on / Status of Treatment Objective(s) / Homework: Kayley had a very clear idea of what she wants to accomplish through therapy. She is open, engaged and goal oriented. Kayley worked with this clinician to create treatment plan goals and objectives. Plan will be to review and sign at next session. Kayley plans on practicing playing out \"what if\" or \"worse case scenario\" thinking, adding some items for relaxation and working on there physical health.     Intervention: Active listening. Lead discussion around therapeutic planning / creating goals and objective for therapy. Discussed therapeutic process and this clinician's approach. Talked & taught about what if or worse case scenario thinking and ways to " manage. Guided Kayley in practice of playing out this kind of thinking. Processed her tendency to want to be in control of and have things very scheduled. She was able to recognize that this was helpful being a single parent and now that she has a supportive boyfriend and her son is 16 this no longer serves her.      Current Stressors / Issues: High blood pressure, getting a leave of absence set for work, managing mental health symptoms - especially panic attacks.    Medication Review: Reviewed    Medication Compliance: Taking medications as prescribed    Changes in Health: High Blood Pressure, needs a physical    Chemical Use Review: No  Substance Use: No    Tobacco Use: YES    ASSESSMENT: Current Emotional / Mental Status (status of significant symptoms):  Risk status no evidence of suicidal or homicidal ideation  Client denies current fears or concerns for personal safety., Client denies current or recent suicidal ideation or behaviors., Client denies current or recent homicidal ideation or behaviors., Client denies current or recent self-injurious behavior or ideation. and Client denies other safety concerns.  A safety and risk management plan has not been developed at this time; however client was given the after-hours number should there be a change in any of these risk factors.    Appearance:                            Appropriate   Eye Contact:                           Good   Psychomotor Behavior:          Normal   Attitude:                                   Cooperative   Orientation:                             All  Speech              Rate / Production:       Normal/ Responsive Normal               Volume:                       Normal   Mood:                                      Normal  relaxed  Affect:                                      Appropriate  Subdued   Thought Content:                    Clear   Thought Form:                        Coherent  Logical   Insight:                                      Good     Collateral Reports Completed: None    PLAN: Weekly therapy sessions, complete paper work for medical leave from work, review how strategies to decrease worrying went, review treatment plan & sign       Patricia Og, [facility]

## 2021-07-12 NOTE — NURSING NOTE
Patient in clinic for outpatient BP reading.   The patient was in a sitting position with feet on the floor - Yes  Has the patient smoked in the last 15 minutes - No  Has the patient exercised within the last 15 minutes - No  Patient was instructed to not cross legs or talk during the procedure - Yes  Patients arm was palm upward, slightly flexed and with the whole arm supported at heart level Yes  Cuff size was measured and is appropriate size for patient using established guideline - Yes  ?  The BP reading today was 154/102  and pulse was 96.     Patient has not taken her Propanolol today as she is out.     B/P RE check 15 minutes     Patient in clinic for outpatient BP reading.   The patient was in a sitting position with feet on the floor - Yes  Has the patient smoked in the last 15 minutes - No  Has the patient exercised within the last 15 minutes - No  The patient rested in the room for 15  minutes before the BP was taken  Patient was instructed to not cross legs or talk during the procedure - Yes  Patients arm was palm upward, slightly flexed and with the whole arm supported at heart level Yes  Cuff size was measured and is appropriate size for patient using established guideline - Yes  ?  The BP reading today was 162/100 and pulse was 95    Patient stated that she had a panic attack prior to comming in the room to recheck her B/P .    Dr. Chapman has been informed of elevated B/P    Patient financial service card was given to patient to call regarding [facility] care to help with the cost of her Propanolol as she has to pay 20% and can't afford it due to being off work .

## 2021-07-12 NOTE — PROGRESS NOTES
BP Readings from Last 6 Encounters:   07/12/21 (!) 162/100   06/21/21 (!) 154/100   04/07/21 (!) 160/100   11/12/20 136/74   10/01/20 (!) 152/108   08/06/12 140/90     - refilled propranolol to treat anxiety and HTN  - consideration for options for medication financial   - c/w losartan 50mg   - keep appt with PCP on 7/21/2021    Guerda Chapman MD

## 2021-07-15 NOTE — PROGRESS NOTES
Assessment & Plan     Anxiety seems to be a little worse. She would like a medication adjustment. I increased Cymbalta to 90 mg daily and added Atarax for as needed. She will follow up in 1 month for med check and a fasting physical.     She feels she has support in her life.     (J30.2) Seasonal allergic rhinitis, unspecified trigger  (primary encounter diagnosis)  Comment: added to medication list  Plan: fexofenadine (ALLEGRA) 180 MG tablet            (F41.0) Panic attack  Comment: increase Cymbalta to 90mg daily     Plan: DULoxetine (CYMBALTA) 60 MG         capsule            (F41.1) LISA (generalized anxiety disorder)  Comment: RF  Plan: DULoxetine (CYMBALTA) 30 MG capsule,         hydrOXYzine (ATARAX) 25 MG tablet,           (I10) Essential hypertension  Comment: RF  Plan: losartan (COZAAR) 50 MG tablet            (Z12.31) Encounter for screening mammogram for breast cancer  Comment: due for screening mammogram   Plan: MA Screen Bilateral w/Julien              See Patient Instructions    Return in about 1 month (around 8/21/2021) for Fasting physical .    Nayla Lund NP  [facility]      Kayley is a 40 year old who presents for the following health issues     HPI     Hypertension Follow-up      Do you check your blood pressure regularly outside of the clinic? No - gives her anxiety to check it.     Are you following a low salt diet? Yes    Are your blood pressures ever more than 140 on the top number (systolic) OR more   than 90 on the bottom number (diastolic), for example 140/90? Yes    Depression and Anxiety Follow-Up    How are you doing with your depression since your last visit? No change    How are you doing with your anxiety since your last visit?  Worsened , having a lot of panic attacks - at least once a day .     Are you having other symptoms that might be associated with depression or anxiety? Yes:  panic attacks . cant control them. States she is  feeling dizzy, having headaches when anxiety flares    Have you had a significant life event? Health Concerns     Do you have any concerns with your use of alcohol or other drugs? No     She has been seeing Patricia who is a psychologist. She has seen her twice which she feels is helpful.    She has triggers for her anxiety. Her father is addicted to meth and her mother is in an abusive relationship    Denies SI/HI thoughts    Social History     Tobacco Use     Smoking status: Current Every Day Smoker     Packs/day: 0.50     Types: Cigarettes     Start date: 1999     Smokeless tobacco: Never Used     Tobacco comment: has patches going to start soon    Vaping Use     Vaping Use: Never used   Substance Use Topics     Alcohol use: Yes     Comment: once a month     Drug use: No     PHQ 6/21/2021 7/6/2021 7/21/2021   PHQ-9 Total Score 12 14 17   Q9: Thoughts of better off dead/self-harm past 2 weeks Not at all Not at all Not at all     LISA-7 SCORE 6/21/2021 7/6/2021 7/21/2021   Total Score 13 18 17     Last PHQ-9 7/21/2021   1.  Little interest or pleasure in doing things 2   2.  Feeling down, depressed, or hopeless 2   3.  Trouble falling or staying asleep, or sleeping too much 3   4.  Feeling tired or having little energy 3   5.  Poor appetite or overeating 2   6.  Feeling bad about yourself 1   7.  Trouble concentrating 2   8.  Moving slowly or restless 2   Q9: Thoughts of better off dead/self-harm past 2 weeks 0   PHQ-9 Total Score 17   Difficulty at work, home, or with people Extremely dIfficult     LISA-7  7/21/2021   1. Feeling nervous, anxious, or on edge 3   2. Not being able to stop or control worrying 3   3. Worrying too much about different things 3   4. Trouble relaxing 3   5. Being so restless that it is hard to sit still 2   6. Becoming easily annoyed or irritable 1   7. Feeling afraid, as if something awful might happen 2   LISA-7 Total Score 17   If you checked any problems, how difficult have they made it  "for you to do your work, take care of things at home, or get along with other people? Extremely difficult       Review of Systems   Constitutional, HEENT, cardiovascular, pulmonary, GI, , musculoskeletal, neuro, skin, endocrine and psych systems are negative, except as otherwise noted.      Objective    BP (!) 142/100   Pulse 83   Temp 98.8  F (37.1  C) (Tympanic)   Ht 1.521 m (4' 11.9\")   Wt 121.6 kg (268 lb)   SpO2 98%   BMI 52.52 kg/m    Body mass index is 52.52 kg/m .  Physical Exam   GENERAL: healthy, alert and no distress  NECK: no adenopathy, no asymmetry, masses, or scars and thyroid normal to palpation  RESP: lungs clear to auscultation - no rales, rhonchi or wheezes  CV: regular rate and rhythm, normal S1 S2, no S3 or S4, no murmur, click or rub, no peripheral edema and peripheral pulses strong  MS: no gross musculoskeletal defects noted, no edema  SKIN: no suspicious lesions or rashes  NEURO: Normal strength and tone, mentation intact and speech normal  PSYCH: mentation appears normal, affect normal/bright      "

## 2021-07-21 ENCOUNTER — OFFICE VISIT (OUTPATIENT)
Dept: FAMILY MEDICINE | Facility: OTHER | Age: 41
End: 2021-07-21
Attending: NURSE PRACTITIONER
Payer: COMMERCIAL

## 2021-07-21 VITALS
BODY MASS INDEX: 52.61 KG/M2 | HEART RATE: 83 BPM | DIASTOLIC BLOOD PRESSURE: 100 MMHG | TEMPERATURE: 98.8 F | WEIGHT: 268 LBS | HEIGHT: 60 IN | OXYGEN SATURATION: 98 % | SYSTOLIC BLOOD PRESSURE: 142 MMHG

## 2021-07-21 DIAGNOSIS — J30.2 SEASONAL ALLERGIC RHINITIS, UNSPECIFIED TRIGGER: Primary | ICD-10-CM

## 2021-07-21 DIAGNOSIS — F41.1 GAD (GENERALIZED ANXIETY DISORDER): ICD-10-CM

## 2021-07-21 DIAGNOSIS — Z12.31 ENCOUNTER FOR SCREENING MAMMOGRAM FOR BREAST CANCER: ICD-10-CM

## 2021-07-21 DIAGNOSIS — I10 ESSENTIAL HYPERTENSION: ICD-10-CM

## 2021-07-21 DIAGNOSIS — F41.0 PANIC ATTACK: ICD-10-CM

## 2021-07-21 PROCEDURE — 99214 OFFICE O/P EST MOD 30 MIN: CPT | Performed by: NURSE PRACTITIONER

## 2021-07-21 RX ORDER — DULOXETIN HYDROCHLORIDE 30 MG/1
90 CAPSULE, DELAYED RELEASE ORAL DAILY
Qty: 270 CAPSULE | Refills: 0 | Status: SHIPPED | OUTPATIENT
Start: 2021-07-21 | End: 2022-01-17

## 2021-07-21 RX ORDER — DULOXETIN HYDROCHLORIDE 60 MG/1
60 CAPSULE, DELAYED RELEASE ORAL DAILY
Qty: 90 CAPSULE | Refills: 1 | Status: SHIPPED | OUTPATIENT
Start: 2021-07-21 | End: 2021-07-21 | Stop reason: DRUGHIGH

## 2021-07-21 RX ORDER — HYDROXYZINE HYDROCHLORIDE 25 MG/1
25 TABLET, FILM COATED ORAL 3 TIMES DAILY PRN
Qty: 60 TABLET | Refills: 0 | Status: SHIPPED | OUTPATIENT
Start: 2021-07-21 | End: 2021-10-06

## 2021-07-21 RX ORDER — FEXOFENADINE HCL 180 MG/1
180 TABLET ORAL DAILY
COMMUNITY
Start: 2021-07-21 | End: 2022-10-03

## 2021-07-21 RX ORDER — LOSARTAN POTASSIUM 50 MG/1
50 TABLET ORAL DAILY
Qty: 90 TABLET | Refills: 1 | Status: SHIPPED | OUTPATIENT
Start: 2021-07-21 | End: 2022-09-16

## 2021-07-21 ASSESSMENT — PAIN SCALES - GENERAL: PAINLEVEL: MODERATE PAIN (5)

## 2021-07-21 ASSESSMENT — ANXIETY QUESTIONNAIRES
3. WORRYING TOO MUCH ABOUT DIFFERENT THINGS: NEARLY EVERY DAY
2. NOT BEING ABLE TO STOP OR CONTROL WORRYING: NEARLY EVERY DAY
1. FEELING NERVOUS, ANXIOUS, OR ON EDGE: NEARLY EVERY DAY
5. BEING SO RESTLESS THAT IT IS HARD TO SIT STILL: MORE THAN HALF THE DAYS
IF YOU CHECKED OFF ANY PROBLEMS ON THIS QUESTIONNAIRE, HOW DIFFICULT HAVE THESE PROBLEMS MADE IT FOR YOU TO DO YOUR WORK, TAKE CARE OF THINGS AT HOME, OR GET ALONG WITH OTHER PEOPLE: EXTREMELY DIFFICULT
6. BECOMING EASILY ANNOYED OR IRRITABLE: SEVERAL DAYS
7. FEELING AFRAID AS IF SOMETHING AWFUL MIGHT HAPPEN: MORE THAN HALF THE DAYS
GAD7 TOTAL SCORE: 17

## 2021-07-21 ASSESSMENT — PATIENT HEALTH QUESTIONNAIRE - PHQ9
SUM OF ALL RESPONSES TO PHQ QUESTIONS 1-9: 17
5. POOR APPETITE OR OVEREATING: NEARLY EVERY DAY

## 2021-07-21 ASSESSMENT — MIFFLIN-ST. JEOR: SCORE: 1805.55

## 2021-07-21 NOTE — NURSING NOTE
"Chief Complaint   Patient presents with     Hypertension     Anxiety       Initial Pulse 83   Temp 98.8  F (37.1  C) (Tympanic)   Ht 1.521 m (4' 11.9\")   Wt 121.6 kg (268 lb)   SpO2 98%   BMI 52.52 kg/m   Estimated body mass index is 52.52 kg/m  as calculated from the following:    Height as of this encounter: 1.521 m (4' 11.9\").    Weight as of this encounter: 121.6 kg (268 lb).  Medication Reconciliation: complete  Caty Bear  "

## 2021-07-21 NOTE — PATIENT INSTRUCTIONS
Patient Education     Established High Blood Pressure    High blood pressure (hypertension) is a long-term (chronic) disease. Often healthcare providers don t know what causes it. But it can be caused by certain health conditions and medicines.  If you have high blood pressure, you may not have any symptoms. If you do have symptoms, they may include:    Headache    Dizziness    Changes in your vision    Chest pain    Shortness of breath  But even without symptoms, high blood pressure that s not treated raises your risk for heart attack, heart failure, kidney disease, and stroke. High blood pressure is a serious health risk and shouldn t be ignored.  Blood pressure measurements are given as 2 numbers. Systolic blood pressure is the upper number. This is the pressure when the heart contracts. Diastolic blood pressure is the lower number. This is the pressure when the heart relaxes between beats. You will see your blood pressure readings written together. For example, a person with a systolic pressure of 118 and a diastolic pressure of 78 will have 118/78 written in the medical record.  Blood pressure is classified as normal, raised (elevated) or stage 1 or stage 2 high blood pressure:    Normal blood pressure. Systolic of less than 120 and diastolic of less than 80 (120/80).    Elevated blood pressure. Systolic of 120 to 129 and diastolic less than 80.    Stage 1 high blood pressure. Systolic is 130 to 139 or diastolic between 80 to 89.    Stage 2 high blood pressure. Systolic is 140 or higher or the diastolic is 90 or higher.  Home care  If you have high blood pressure, follow these home care guidelines to help lower your blood pressure. If you are taking medicines for high blood pressure, these methods may reduce or end your need for medicines in the future.    Start a weight-loss program if you are overweight.    Cut back on how much salt you get in your diet. Here s how to do this:  ? Don t eat foods that have a  lot of salt. These include olives, pickles, smoked meats, and salted potato chips.  ? Don t add salt to your food at the table.  ? Use only small amounts of salt when cooking.    Start an exercise program. Talk with your healthcare provider about the type of exercise program that would be best for you. It doesn't have to be hard. Even brisk walking for 20 minutes 3 times a week is a good form of exercise.    Don t take medicines that stimulate the heart. This includes many over-the-counter cold and sinus decongestant pills and sprays, as well as diet pills. Check the warnings about high blood pressure on the label. Before buying any over-the-counter medicines or supplements, always ask the pharmacist about the product's possible interaction with your high blood pressure and your high blood pressure medicines.    Stimulants such as amphetamine or cocaine could be deadly for someone with high blood pressure. Never take these.    Limit how much caffeine you get in your diet. Switch to caffeine-free products.    Stop smoking. If you are a long-time smoker, this can be hard. Talk with your healthcare provider about medicines and nicotine replacement options to help you. Also join a stop-smoking program . This makes it more likely that you will quit for good.    Learn how to handle stress. This is an important part of any program to lower blood pressure. Learn about relaxation methods such as meditation, yoga, or biofeedback.    If your provider prescribed medicines, take them exactly as directed. Missing doses may cause your blood pressure get out of control.    If you miss a dose, check with your healthcare provider or pharmacist about what to do.    Think about buying an automatic blood pressure machine to check your blood pressure at home. Ask your provider for a recommendation. You can get one of these at most pharmacies.  Using a home blood pressure monitor  The American Heart Association advises the following  guidelines for home blood pressure monitoring:    Don't smoke or drink coffee for 30 minutes before taking your blood pressure.    Go to the bathroom before the test.    Relax for 5 minutes before taking the measurement.    Sit with your back supported (don't sit on a couch or soft chair). Keep your feet on the floor uncrossed. Place your arm on a solid flat surface (such as a table) with the upper part of the arm at heart level. Place the middle of the cuff directly above the bend of the elbow. Check the monitor's instruction manual for an illustration.    Take multiple readings. When you measure, take 2 to 3 readings one minute apart and record all of the results.    Take your blood pressure at the same time every day, or as your healthcare provider advises.    Record the date, time, and blood pressure reading.    Take the record with you to your next healthcare appointment. If your blood pressure monitor has a built-in memory, just take the monitor with you to your next appointment.    Call your provider if you have several high readings. Don't be frightened by one high blood pressure reading. But if you get a few high readings, check in with your healthcare provider.  Follow-up care  You will need to see your healthcare provider regularly. This is to check your blood pressure and to make changes to your medicines. Make a follow-up appointment as directed. Bring the record of your home blood pressure readings to the appointment.  Call 911  Call 911 if you have any of these:    Blood pressure of 180/120 or higher    Chest pain or shortness of breath    Weakness of an arm or leg or one side of the face    Problems speaking or seeing     When to get medical advice  Call your healthcare provider right away if any of these occur:    Severe headache    Throbbing or rushing sound in the ears    Nosebleed    Sudden severe pain in your belly (abdomen)    Extreme drowsiness, confusion, or fainting    Dizziness or spinning  feeling (vertigo)  [employer] last reviewed this educational content on 7/1/2019 2000-2021 [employer]. All rights reserved. This information is not intended as a substitute for professional medical care. Always follow your healthcare professional's instructions.    Patient Education     Treating Anxiety Disorders with Medicine  An anxiety disorder can make you feel nervous or apprehensive, even without a clear reason. In people age 65 and older, generalized anxiety disorder is one of the most commonly diagnosed anxiety disorders. Many times it occurs with depression. Certain anxiety disorders can cause intense feelings of fear or panic. You may even have physical symptoms such as a racing heartbeat, sweating, or dizziness. If you have these feelings, you don t have to suffer anymore. Treatment to help you overcome your fears will likely include therapy (also called counseling). Medicine may also be prescribed to help control your symptoms.     Medicines  Certain medicines may be prescribed to help control your symptoms. So you may feel less anxious. You may also feel able to move forward with therapy. At first, medicines and dosages may need to be adjusted to find what works best for you. Try to be patient. Tell your healthcare provider how a medicine makes you feel. This way, you can work together to find the treatment that s best for you. Keep in mind that medicines can have side effects. Talk with your provider about any side effects that are bothering you. Changing the dose or type of medicine may help. Don t stop taking medicine on your own. That can cause symptoms to come back or cause dangerous withdrawal symptoms.     Anti-anxiety medicine. This medicine eases symptoms and helps you relax. Your healthcare provider will explain when and how to use it. It may be prescribed for use before situations that make you anxious. You may also be told to take medicine on a regular schedule. Anti-anxiety  medicine may make you feel a little sleepy or  out of it.  Don t drive a car or operate machinery while on this medicine, until you know how it affects you.  Never use alcohol or other drugs with anti-anxiety medicines. This could result in loss of muscular control, sedation, coma, or death. Also, use only the amount of medicine prescribed for you. If you think you may have taken too much, get emergency care right away. Never share your medicines with others. Store these medicines in a safe place that can't be reached by children or visitors.   Keep taking medicines as prescribed  Never change your dosage, share or use another person's medicine, or stop taking your medicines without talking to your healthcare provider first. Keep the following in mind:     Some medicines must be taken on a schedule. Make this part of your daily routine. For instance, always take your pill before brushing your teeth. A pillbox can help you remember if you ve taken your medicine each day.    Medicines are often taken for 6 to 12 months. Your healthcare provider will then evaluate whether you need to stay on them. Many people who have also had therapy may no longer need medicine to manage anxiety.    You may need to stop taking medicine slowly to give your body time to adjust. When it s time to stop, your healthcare provider will tell you more. Remember: Never stop taking your medicine without talking to your provider first.    If symptoms return, you may need to start taking medicines again.  This isn t your fault. It s just the nature of your anxiety disorder.  What to think about    Side effects. Medicines may cause side effects. Ask your healthcare provider or pharmacist what you can expect. They may have ideas for avoiding some side effects.    Sexual problems. Some antidepressants can affect your desire for sex or your ability to have an orgasm. A change in dosage or medicine often solves the problem. If you have a sexual side  effect that concerns you, tell your healthcare provider.    Addiction. If you ve never had a problem with drugs or alcohol, you may not have a problem with medicines used to treat anxiety disorders. But always discuss the medicines with your healthcare provider before taking them. If you have a history of addiction, you may not be able to use certain medicines used to treat anxiety disorders.    Medicine interactions. Always check with your pharmacist before using any over-the-counter medicines (OTCs), including herbal supplements. Some OTCs may interact with your anti-anxiety medicines and increase or decrease their effectiveness.    [facility] last reviewed this educational content on 12/1/2019 2000-2021 [employer]. All rights reserved. This information is not intended as a substitute for professional medical care. Always follow your healthcare professional's instructions.    Increase Cymbalta to 90 mg daily.   Follow up 1 month for fasting physical.

## 2021-07-22 ENCOUNTER — OFFICE VISIT (OUTPATIENT)
Dept: PSYCHOLOGY | Facility: OTHER | Age: 41
End: 2021-07-22
Attending: COUNSELOR
Payer: COMMERCIAL

## 2021-07-22 DIAGNOSIS — F41.0 PANIC DISORDER: ICD-10-CM

## 2021-07-22 DIAGNOSIS — F41.1 GAD (GENERALIZED ANXIETY DISORDER): Primary | ICD-10-CM

## 2021-07-22 PROCEDURE — 90837 PSYTX W PT 60 MINUTES: CPT | Performed by: COUNSELOR

## 2021-07-22 ASSESSMENT — ANXIETY QUESTIONNAIRES: GAD7 TOTAL SCORE: 17

## 2021-07-23 ASSESSMENT — ANXIETY QUESTIONNAIRES
6. BECOMING EASILY ANNOYED OR IRRITABLE: SEVERAL DAYS
5. BEING SO RESTLESS THAT IT IS HARD TO SIT STILL: SEVERAL DAYS
IF YOU CHECKED OFF ANY PROBLEMS ON THIS QUESTIONNAIRE, HOW DIFFICULT HAVE THESE PROBLEMS MADE IT FOR YOU TO DO YOUR WORK, TAKE CARE OF THINGS AT HOME, OR GET ALONG WITH OTHER PEOPLE: EXTREMELY DIFFICULT
3. WORRYING TOO MUCH ABOUT DIFFERENT THINGS: NEARLY EVERY DAY
7. FEELING AFRAID AS IF SOMETHING AWFUL MIGHT HAPPEN: MORE THAN HALF THE DAYS
GAD7 TOTAL SCORE: 15
2. NOT BEING ABLE TO STOP OR CONTROL WORRYING: NEARLY EVERY DAY
1. FEELING NERVOUS, ANXIOUS, OR ON EDGE: NEARLY EVERY DAY

## 2021-07-23 ASSESSMENT — PATIENT HEALTH QUESTIONNAIRE - PHQ9
5. POOR APPETITE OR OVEREATING: MORE THAN HALF THE DAYS
SUM OF ALL RESPONSES TO PHQ QUESTIONS 1-9: 15

## 2021-07-23 NOTE — PROGRESS NOTES
The author of this note documented a reason for not sharing it with the patient.    Counseling Progress Note    Client Name: Kayley Pak    Date of Service (when I saw the patient): 07/22/2021    Service Type: Individual Thesisi    Session Start Time: 11:00am  Session End Time: 12:00pm  Session Length: I spent 53+ minutes performing psychotherapy during this office visit.  Session Number: 3    Attendees: Client     Health Maintenance Topics with due status: Overdue       Topic Date Due    ADVANCE CARE PLANNING Never done    Pneumococcal Vaccine: Pediatrics (0 to 5 Years) and At-Risk Patients (6 to 64 Years) Never done    COVID-19 Vaccine Never done    DTAP/TDAP/TD IMMUNIZATION 08/25/2008     Health Maintenance Topics with due status: Due Soon       Topic Date Due    HPV TEST 11/12/2021    PAP 11/12/2021       DSM5 Diagnoses:  300.01 (F41.0) Panic Disorder  300.02 (F41.1) Generalized Anxiety Disorder    Treatment Plan Due Date: Developed 7/12/21, will review and sign at next apt  Diagnostic Assessment Update - Due Date: 7/5/2022    DATA    Treatment Objective(s) Addressed in This Session: Decrease worries, better manage panic attacks, improve physical health, increase relaxation time and ability    Progress on / Status of Treatment Objective(s) / Homework: Kayley discussed her work situation. Paperwork for FMLA was received and this clinician began completion. She reports having panic attacks and high levels of anxiety more than half the days.     Intervention: Active listening and open questions. Processed her relationship with her parents, challenges and how this shows up for her today. Discussed the loss of a relationship with her dad as a loss without grieving this loss, which is what can help with healing and moving forward. Provided psycho education on sleep hygiene.    Current Stressors / Issues: High blood pressure, getting a leave of absence set for work, managing mental health symptoms - especially panic  "attacks.    Medication Review: Reviewed    Medication Compliance: Taking medications as prescribed    Changes in Health: High Blood Pressure, needs a physical    Chemical Use Review: No  Substance Use: No    Tobacco Use: YES    ASSESSMENT: Current Emotional / Mental Status (status of significant symptoms):  Risk status no evidence of suicidal or homicidal ideation  Client denies current fears or concerns for personal safety., Client denies current or recent suicidal ideation or behaviors., Client denies current or recent homicidal ideation or behaviors., Client denies current or recent self-injurious behavior or ideation. and Client denies other safety concerns.  A safety and risk management plan has not been developed at this time; however client was given the after-hours number should there be a change in any of these risk factors.    Appearance:                            Appropriate   Eye Contact:                           Good   Psychomotor Behavior:          Normal   Attitude:                                   Cooperative   Orientation:                             All  Speech              Rate / Production:       Normal/ Responsive Normal               Volume:                       Normal   Mood:                                      Normal  relaxed  Affect:                                      Appropriate  Subdued   Thought Content:                    Clear   Thought Form:                        Coherent  Logical   Insight:                                     Good     Collateral Reports Completed: None    PLAN: Weekly therapy sessions, check-in regarding \"worse case scenario\" and \"what if\" thinking. Review treatment plan & sign       SHANIKA Pike  "

## 2021-07-24 ASSESSMENT — ANXIETY QUESTIONNAIRES: GAD7 TOTAL SCORE: 15

## 2021-07-27 ENCOUNTER — ANCILLARY PROCEDURE (OUTPATIENT)
Dept: MAMMOGRAPHY | Facility: OTHER | Age: 41
End: 2021-07-27
Attending: NURSE PRACTITIONER
Payer: COMMERCIAL

## 2021-07-27 ENCOUNTER — HOSPITAL ENCOUNTER (OUTPATIENT)
Dept: ULTRASOUND IMAGING | Facility: HOSPITAL | Age: 41
End: 2021-07-27
Attending: NURSE PRACTITIONER
Payer: COMMERCIAL

## 2021-07-27 DIAGNOSIS — Z12.31 ENCOUNTER FOR SCREENING MAMMOGRAM FOR BREAST CANCER: ICD-10-CM

## 2021-07-27 DIAGNOSIS — R92.8 ABNORMAL FINDING ON MAMMOGRAPHY: ICD-10-CM

## 2021-07-27 PROCEDURE — 76642 ULTRASOUND BREAST LIMITED: CPT | Mod: LT

## 2021-07-27 PROCEDURE — 77067 SCR MAMMO BI INCL CAD: CPT | Mod: TC | Performed by: RADIOLOGY

## 2021-07-27 PROCEDURE — 77063 BREAST TOMOSYNTHESIS BI: CPT | Mod: TC | Performed by: RADIOLOGY

## 2021-07-29 ENCOUNTER — OFFICE VISIT (OUTPATIENT)
Dept: PSYCHOLOGY | Facility: OTHER | Age: 41
End: 2021-07-29
Attending: COUNSELOR
Payer: COMMERCIAL

## 2021-07-29 DIAGNOSIS — F41.0 PANIC DISORDER: ICD-10-CM

## 2021-07-29 DIAGNOSIS — F41.1 GAD (GENERALIZED ANXIETY DISORDER): Primary | ICD-10-CM

## 2021-07-29 PROCEDURE — 90837 PSYTX W PT 60 MINUTES: CPT | Performed by: COUNSELOR

## 2021-07-29 ASSESSMENT — PATIENT HEALTH QUESTIONNAIRE - PHQ9
5. POOR APPETITE OR OVEREATING: MORE THAN HALF THE DAYS
SUM OF ALL RESPONSES TO PHQ QUESTIONS 1-9: 17

## 2021-07-29 ASSESSMENT — ANXIETY QUESTIONNAIRES
5. BEING SO RESTLESS THAT IT IS HARD TO SIT STILL: SEVERAL DAYS
1. FEELING NERVOUS, ANXIOUS, OR ON EDGE: NEARLY EVERY DAY
IF YOU CHECKED OFF ANY PROBLEMS ON THIS QUESTIONNAIRE, HOW DIFFICULT HAVE THESE PROBLEMS MADE IT FOR YOU TO DO YOUR WORK, TAKE CARE OF THINGS AT HOME, OR GET ALONG WITH OTHER PEOPLE: EXTREMELY DIFFICULT
GAD7 TOTAL SCORE: 15
6. BECOMING EASILY ANNOYED OR IRRITABLE: SEVERAL DAYS
7. FEELING AFRAID AS IF SOMETHING AWFUL MIGHT HAPPEN: MORE THAN HALF THE DAYS
2. NOT BEING ABLE TO STOP OR CONTROL WORRYING: NEARLY EVERY DAY
3. WORRYING TOO MUCH ABOUT DIFFERENT THINGS: NEARLY EVERY DAY

## 2021-07-29 NOTE — PROGRESS NOTES
Behavioral Health Treatment Plan      Client's Name: Kayley Pak  YOB: 1980    Date: 7/23/2021    DSM5 Diagnoses: 300.02 (F41.1) Generalized Anxiety Disorder; 300.01 (F41.0) Panic Disorder    Psychosocial / Contextual Factors: Past trauma experiences, strained relationships with family of origin, currently unable to work due to mental health symptoms - panic and anxiety    Referral / Collaboration:  Will collaborate with care team as indicated during treatment.    Anticipated number of session or this episode of care: 26      MeasurableTreatment Goal(s) related to diagnosis / functional impairment(s)  Goal 1:  Patient will experience a reduction in depressive and anxious symptoms, along with a corresponding increase in positive emotion and life satisfaction.    Objective #A: Patient will experience a reduction in depressed mood, will develop more effective coping skills to manage depressive symptoms, will develop healthy cognitive patterns and beliefs, will increase ability to function adaptively and will continue to take medications as prescribed / participate in supportive activities and services    Status: Continued - Date(s):     Objective #B: Patient will experience a reduction in anxiety, will develop more effective coping skills to manage anxiety symptoms, will develop healthy cognitive patterns and beliefs and will increase ability to function adaptively  Status: Continued - Date(s):     Objective #C: Patient will develop better understanding of triggers and coping strategies to stabilize mood  Status: Continued - Date(s):     Goal 2:  Patient will identify and increase engagement in valued activity, i.e. improving social connections/relationships, pursuing occupational goals or personally meaningful pursuits, exploration of meaning in life.     Objective #A: Patient will identify meaningful activity in social, occupational and  personal goals, and increase behavioral activation around  these goals   Status: Continued - Date(s):     Objective #B: Patient will address relationship difficulties in a more adaptive manner  Status: Continued - Date(s):     Objective #C: Patient will develop coping/problem-solving skills to facilitate more adaptive adjustment and will effectively address problems that interfere with adaptive functioning  Status: Continued - Date(s):       Possible Therapeutic Intervention(s)  Psycho-education regarding mental health diagnoses and treatment options    Skills training    Explore skills useful to client in current situation.    Skills include assertiveness, communication, conflict management, problem-solving, relaxation, etc.    Solution-Focused Therapy    Explore patterns in patient's relationships and discuss options for new behaviors.    Explore patterns in patient's actions and choices and discuss options for new behaviors.    Cognitive-behavioral Therapy    Discuss common cognitive distortions, identify them in patient's life.    Explore ways to challenge, replace, and act against these cognitions.    Acceptance and Commitment Therapy    Explore and identify important values in patient's life.    Discuss ways to commit to behavioral activation around these values.    Psychodynamic psychotherapy    Discuss patient's emotional dynamics and issues and how they impact behaviors.    Explore patient's history of relationships and how they impact present behaviors.    Explore how to work with and make changes in these schemas and patterns.    Narrative Therapy    Explore the patient's story of his/her life from his/her perspective.    Explore alternate ways of understanding their experience, identifying exceptions, developing new themes.    Interpersonal Psychotherapy    Explore patterns in relationships that are effective or ineffective at helping patient reach their goals, find satisfying experience.    Discuss new patterns or behaviors to engage in for improved social  functioning.    Behavioral Activation    Discuss steps patient can take to become more involved in meaningful activity.    Identify barriers to these activities and explore possible solutions.    Mindfulness-Based Strategies    Discuss skills based on development and application of mindfulness.    Skills drawn from compassion-focused therapy, dialectical behavior therapy, mindfulness-based stress reduction, mindfulness-based cognitive therapy, etc.      We have developed these goals together during our work to this point. Patient has assisted in the development of these goals and has agreed to this treatment plan.        Kayley Pak   July 29, 2021     Acknowledgement of Current Treatment Plan       I have reviewed my treatment plan with my therapist / counselor on ______________. I agree with the plan as it is written in the electronic health record.    Name Signature   Kayley Pak      Name of Therapist / Counselor

## 2021-07-29 NOTE — PROGRESS NOTES
The author of this note documented a reason for not sharing it with the patient.    Counseling Progress Note    Client Name: Kayley Pak    Date of Service (when I saw the patient): 07/29/2021    Service Type: Individual Thesisi    Session Start Time: 11:00am  Session End Time: 12:00pm  Session Length: I spent 53+ minutes performing psychotherapy during this office visit.  Session Number: 4    Attendees: Client     Health Maintenance Topics with due status: Overdue       Topic Date Due    ADVANCE CARE PLANNING Never done    Pneumococcal Vaccine: Pediatrics (0 to 5 Years) and At-Risk Patients (6 to 64 Years) Never done    COVID-19 Vaccine Never done    DTAP/TDAP/TD IMMUNIZATION 08/25/2008     Health Maintenance Topics with due status: Due Soon       Topic Date Due    HPV TEST 11/12/2021    PAP 11/12/2021       DSM5 Diagnoses:  300.01 (F41.0) Panic Disorder  300.02 (F41.1) Generalized Anxiety Disorder    Treatment Plan Due Date: Developed 7/12/21, will review and sign at next apt  Diagnostic Assessment Update - Due Date: 7/5/2022    DATA    Treatment Objective(s) Addressed in This Session: Decrease worries, better manage panic attacks, improve physical health, increase relaxation time and ability    Progress on / Status of Treatment Objective(s) / Homework: Kayley discussed her work situation. Kayley continues to struggle with high levels of anxiety and panic attacks.      Intervention: Active listening and open questions. Provided psycho education about essential oils. Let Kayley try inhaling lavender for anxiety. Lead Life Map activity from ACT therapy. Kayley took a picture of what she had finished from her life map and plans to think about what behaviors she has/does to move away from those uncomfortable internal experiences.   Discussed her leave from work. This clinician completed paperwork and sent it to medical records to fax to her employer. They did not receive it yet - this clinician will check on the  status of those papers.     Current Stressors / Issues: High blood pressure, getting a leave of absence set for work, managing mental health symptoms - especially panic attacks.    Medication Review: Reviewed    Medication Compliance: Taking medications as prescribed    Changes in Health: High Blood Pressure, needs a physical, is having pain in her left breast and working with her doctor on this. Ruled out any problems with the breast tissue.     Chemical Use Review: No  Substance Use: No    Tobacco Use: YES    ASSESSMENT: Current Emotional / Mental Status (status of significant symptoms):  Risk status no evidence of suicidal or homicidal ideation  Client denies current fears or concerns for personal safety., Client denies current or recent suicidal ideation or behaviors., Client denies current or recent homicidal ideation or behaviors., Client denies current or recent self-injurious behavior or ideation. and Client denies other safety concerns.  A safety and risk management plan has not been developed at this time; however client was given the after-hours number should there be a change in any of these risk factors.    Appearance:                            Appropriate   Eye Contact:                           Good   Psychomotor Behavior:          Normal   Attitude:                                   Cooperative   Orientation:                             All  Speech              Rate / Production:       Normal/ Responsive Normal               Volume:                       Normal   Mood:                                      Normal  relaxed  Affect:                                      Appropriate  Subdued   Thought Content:                    Clear   Thought Form:                        Coherent  Logical   Insight:                                     Good     Collateral Reports Completed: PHQ9 & GAD7    PLAN: Weekly therapy sessions, continue working with / complete the life map activity. Review treatment plan & sign.  Report any know information on medical records / [place] paperwork.        Patricia Og, [facility]

## 2021-07-30 ASSESSMENT — ANXIETY QUESTIONNAIRES: GAD7 TOTAL SCORE: 15

## 2021-08-05 ENCOUNTER — OFFICE VISIT (OUTPATIENT)
Dept: PSYCHOLOGY | Facility: OTHER | Age: 41
End: 2021-08-05
Attending: COUNSELOR
Payer: COMMERCIAL

## 2021-08-05 DIAGNOSIS — F41.0 PANIC DISORDER: Primary | ICD-10-CM

## 2021-08-05 DIAGNOSIS — F41.1 GAD (GENERALIZED ANXIETY DISORDER): ICD-10-CM

## 2021-08-05 PROCEDURE — 90837 PSYTX W PT 60 MINUTES: CPT | Performed by: COUNSELOR

## 2021-08-05 ASSESSMENT — ANXIETY QUESTIONNAIRES
5. BEING SO RESTLESS THAT IT IS HARD TO SIT STILL: MORE THAN HALF THE DAYS
7. FEELING AFRAID AS IF SOMETHING AWFUL MIGHT HAPPEN: MORE THAN HALF THE DAYS
2. NOT BEING ABLE TO STOP OR CONTROL WORRYING: NEARLY EVERY DAY
3. WORRYING TOO MUCH ABOUT DIFFERENT THINGS: NEARLY EVERY DAY
6. BECOMING EASILY ANNOYED OR IRRITABLE: SEVERAL DAYS
IF YOU CHECKED OFF ANY PROBLEMS ON THIS QUESTIONNAIRE, HOW DIFFICULT HAVE THESE PROBLEMS MADE IT FOR YOU TO DO YOUR WORK, TAKE CARE OF THINGS AT HOME, OR GET ALONG WITH OTHER PEOPLE: EXTREMELY DIFFICULT
1. FEELING NERVOUS, ANXIOUS, OR ON EDGE: NEARLY EVERY DAY
GAD7 TOTAL SCORE: 16

## 2021-08-05 NOTE — PROGRESS NOTES
The author of this note documented a reason for not sharing it with the patient.    Counseling Progress Note    Client Name: Kayley Pak    Date of Service (when I saw the patient): 08/05/2021    Service Type: Individual Thesisi    Session Start Time: 11:00am  Session End Time: 12:00pm  Session Length: I spent 53+ minutes performing psychotherapy during this office visit.  Session Number: 5    Attendees: Client     Health Maintenance Topics with due status: Overdue       Topic Date Due    ADVANCE CARE PLANNING Never done    Pneumococcal Vaccine: Pediatrics (0 to 5 Years) and At-Risk Patients (6 to 64 Years) Never done    COVID-19 Vaccine Never done    DTAP/TDAP/TD IMMUNIZATION 08/25/2008     Health Maintenance Topics with due status: Due Soon       Topic Date Due    HPV TEST 11/12/2021    PAP 11/12/2021       DSM5 Diagnoses:  300.01 (F41.0) Panic Disorder  300.02 (F41.1) Generalized Anxiety Disorder    Treatment Plan Due Date: Developed 10/10/2021  Diagnostic Assessment Update - Due Date: 7/5/2022    DATA    Treatment Objective(s) Addressed in This Session: Decrease worries, better manage panic attacks, improve physical health, increase relaxation time and ability, improve sleep    Progress on / Status of Treatment Objective(s) / Homework: Kayley reports having fewer and less severe panic attacks. She has been spending more time with her family and has plans this coming week as well. Kayley followed along with relaxation breathing. She reported feeling less tense overall, decreased tension in her head and decreased pain in her abdomen. She reports using deep breathing when she notices irritability or anxiety at home. She agreed to work relaxation breathing into her evening routine to help her relax and get ready for bed.   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    Intervention: Active listening, open questions and reflections. Processed difficult emotions and memories  with her father. Prompted her to notice what she was feeling and where in her body she noticed these feelings. Taught and lead relaxation breathing. Prompted her to check back in with her body and notice changes from using relaxation breathing. Discussion about how she is using this outside of here. She reports daily use when feeling anxious or irritable. Made a plan to also use this routinely in the evening/night getting prepared to go to sleep. Provided psycho education about essential oils. Let Kayley try inhaling lavender for anxiety.     Current Stressors / Issues: High blood pressure, getting a leave of absence set for work, managing mental health symptoms - especially panic attacks.    Medication Review: Reviewed    Medication Compliance: Taking medications as prescribed - switching when she takes them to evening.    Changes in Health: High Blood Pressure, needs a physical, is having pain in her left breast and working with her doctor on this. Ruled out any problems with the breast tissue.     Chemical Use Review: No  Substance Use: No    Tobacco Use: YES    ASSESSMENT: Current Emotional / Mental Status (status of significant symptoms):  Risk status no evidence of suicidal or homicidal ideation  Client denies current fears or concerns for personal safety., Client denies current or recent suicidal ideation or behaviors., Client denies current or recent homicidal ideation or behaviors., Client denies current or recent self-injurious behavior or ideation. and Client denies other safety concerns.  A safety and risk management plan has not been developed at this time; however client was given the after-hours number should there be a change in any of these risk factors.    Appearance:                            Appropriate   Eye Contact:                           Good   Psychomotor Behavior:          Normal   Attitude:                                   Cooperative   Orientation:                              All  Speech              Rate / Production:       Normal/ Responsive Normal               Volume:                       Normal   Mood:                                      Normal  relaxed  Affect:                                      Appropriate  Subdued   Thought Content:                    Clear   Thought Form:                        Coherent  Logical   Insight:                                     Good     Collateral Reports Completed: PHQ9 & GAD7    PLAN: Weekly therapy sessions. Check-in about relaxation breathing, sleep and anything noticed with changing the time she takes her meds. From previous session - [place] back to complete the life map activity. Review treatment plan & sign.       SHANIKA Pike

## 2021-08-06 ASSESSMENT — ANXIETY QUESTIONNAIRES: GAD7 TOTAL SCORE: 16

## 2021-08-17 RX ORDER — DULOXETIN HYDROCHLORIDE 60 MG/1
CAPSULE, DELAYED RELEASE ORAL
COMMUNITY
Start: 2021-07-21 | End: 2021-08-25

## 2021-08-17 NOTE — PROGRESS NOTES
SUBJECTIVE:   CC: Kayley Pak is an 40 year old woman who presents for preventive health visit.       Patient has been advised of split billing requirements and indicates understanding: Yes  HPI        Hypertension Follow-up      Do you check your blood pressure regularly outside of the clinic? Yes     Are you following a low salt diet? Yes    Are your blood pressures ever more than 140 on the top number (systolic) OR more   than 90 on the bottom number (diastolic), for example 140/90? Yes    Poor sleeping at night. Snoring per bed partner. She would like a sleep study.     Intermittent dizziness. Ringing in ears at times. Symptoms last a couple seconds and dissipate. Right ear pain/fullness. Minimal NSAID use.     Due for repeat pap in November 2021 as a 1 year follow up.     Today's PHQ-2 Score:   PHQ-2 ( 1999 Pfizer) 7/21/2021   Q1: Little interest or pleasure in doing things 2   Q2: Feeling down, depressed or hopeless 2   PHQ-2 Score 4       Abuse: Current or Past (Physical, Sexual or Emotional) - No  Do you feel safe in your environment? Yes    Have you ever done Advance Care Planning? (For example, a Health Directive, POLST, or a discussion with a medical provider or your loved ones about your wishes): No, advance care planning information given to patient to review.  Patient declined advance care planning discussion at this time.    Social History     Tobacco Use     Smoking status: Current Every Day Smoker     Packs/day: 0.50     Types: Cigarettes     Start date: 1999     Smokeless tobacco: Never Used     Tobacco comment: has patches going to start soon    Substance Use Topics     Alcohol use: Yes     Comment: once a month     If you drink alcohol do you typically have >3 drinks per day or >7 drinks per week? No    Alcohol Use 8/25/2021   Prescreen: >3 drinks/day or >7 drinks/week? No       Reviewed orders with patient.  Reviewed health maintenance and updated orders accordingly - Yes  BP Readings  from Last 3 Encounters:   08/25/21 (!) 134/96   07/21/21 (!) 142/100   07/12/21 (!) 162/100    Wt Readings from Last 3 Encounters:   08/25/21 121.6 kg (268 lb)   07/21/21 121.6 kg (268 lb)   06/21/21 123.8 kg (273 lb)                  Patient Active Problem List   Diagnosis     Family history of diabetes mellitus     Seasonal allergic rhinitis     CARDIOVASCULAR SCREENING; LDL GOAL LESS THAN 160     Dyslipidemia     Atypical nevus     Atypical nevus of foot, left     Contraceptive management     Eczema, unspecified type     Essential hypertension     LISA (generalized anxiety disorder)     Herpesvirus 2     Panic attack     Pes anserine bursitis     Right knee pain     Morbid obesity (H)     Tobacco abuse     Past Surgical History:   Procedure Laterality Date     IR RENAL STONE REMOVAL RIGHT  2010     miscarriage  1999    Age 19 D & C.     mole removed  2019    Removed in between toes on right foot betwwen 2nd and 3rd toe     TONSILLECTOMY, ADENOIDECTOMY, COMBINED  1993       Social History     Tobacco Use     Smoking status: Current Every Day Smoker     Packs/day: 0.50     Types: Cigarettes     Start date: 1999     Smokeless tobacco: Never Used     Tobacco comment: has patches going to start soon    Substance Use Topics     Alcohol use: Yes     Comment: once a month     Family History   Problem Relation Age of Onset     Migraines Mother      Hypertension Mother      Alcoholism Father      Mental Illness Father      Lupus Paternal Aunt      Hypertension Maternal Grandmother      Factor V Leiden deficiency Maternal Grandfather      Diabetes Type 2  Maternal Grandfather      Hypertension Paternal Grandmother      Cancer - colorectal Paternal Grandmother      Cancer Paternal Grandfather         stomach     Lupus Paternal Grandfather      Factor V Leiden deficiency Maternal Uncle      Heart Disease Maternal Uncle         Multiple heart attacks     Factor V Leiden deficiency Maternal Aunt      Factor V Leiden deficiency  Maternal Uncle      Breast Cancer Cousin      Lung Cancer Cousin         also foot cancer         Current Outpatient Medications   Medication Sig Dispense Refill     DULoxetine (CYMBALTA) 30 MG capsule Take 3 capsules (90 mg) by mouth daily 270 capsule 0     etonogestrel (NEXPLANON) 68 MG IMPL Inject 68 mg Subcutaneous Inserted 7/9/2020       fexofenadine (ALLEGRA) 180 MG tablet Take 1 tablet (180 mg) by mouth daily       hydrOXYzine (ATARAX) 25 MG tablet Take 1 tablet (25 mg) by mouth 3 times daily as needed for anxiety 60 tablet 0     losartan (COZAAR) 50 MG tablet Take 1 tablet (50 mg) by mouth daily 90 tablet 1     medroxyPROGESTERone (PROVERA) 10 MG tablet Take 1 tablet (10 mg) by mouth daily 30 tablet 0     nicotine (NICODERM CQ) 14 MG/24HR 24 hr patch Place 1 patch onto the skin every 24 hours 30 patch 0     nicotine (NICODERM CQ) 21 MG/24HR 24 hr patch Place 1 patch onto the skin every 24 hours 30 patch 0     nicotine (NICODERM CQ) 7 MG/24HR 24 hr patch Place 1 patch onto the skin every 24 hours 30 patch 0     nystatin (MYCOSTATIN) 048973 UNIT/GM external powder Apply topically 2 times daily 60 g 0     propranolol ER (INDERAL LA) 160 MG 24 hr capsule Take 1 capsule (160 mg) by mouth daily 30 capsule 1       Breast Cancer Screening:  Any new diagnosis of family breast, ovarian, or bowel cancer? No    FHS-7:   Breast CA Risk Assessment (FHS-7) 7/27/2021   Did any of your first-degree relatives have breast or ovarian cancer? Yes     click delete button to remove this line now  Mammogram Screening - Offered annual screening and updated Health Maintenance for mutual plan based on risk factor consideration   and had mammogram 7/2021. Due for repeat imaging in 3 months.   Pertinent mammograms are reviewed under the imaging tab.    History of abnormal Pap smear:   Last 3 Pap and HPV Results:   PAP / HPV Latest Ref Rng & Units 11/12/2020 8/6/2012   PAP (Historical) - ASC-US(A) NIL   HPV16 NEG:Negative Negative -    HPV18 NEG:Negative Negative -   HRHPV NEG:Negative Negative -     PAP / HPV Latest Ref Rng & Units 11/12/2020 8/6/2012   PAP (Historical) - ASC-US(A) NIL   HPV16 NEG:Negative Negative -   HPV18 NEG:Negative Negative -   HRHPV NEG:Negative Negative -     Reviewed and updated as needed this visit by clinical staff  Tobacco   Meds              Reviewed and updated as needed this visit by Provider                Past Medical History:   Diagnosis Date     Anxiety     Panic attack     LISA (generalized anxiety disorder) 12/22/2017     Herpes simplex type 2 infection 201212     Hypertension      Obesity      Pes anserine bursitis 08/25/2015     Psoriasis      Sleep apnea, unspecified type 10/20/2017     TMJ (dislocation of temporomandibular joint) 09/2009    Resolved     Tobacco abuse 6/21/2021      Past Surgical History:   Procedure Laterality Date     IR RENAL STONE REMOVAL RIGHT  2010     miscarriage  1999    Age 19 D & C.     mole removed  2019    Removed in between toes on right foot betwwen 2nd and 3rd toe     TONSILLECTOMY, ADENOIDECTOMY, COMBINED  1993       Review of Systems  CONSTITUTIONAL: NEGATIVE for fever, chills, change in weight  INTEGUMENTARU/SKIN: NEGATIVE for worrisome rashes, moles or lesions  EYES: NEGATIVE for vision changes or irritation  ENT: NEGATIVE for ear, mouth and throat problems  RESP: NEGATIVE for significant cough or SOB  BREAST: NEGATIVE for masses, tenderness or discharge  CV: NEGATIVE for chest pain, palpitations or peripheral edema  GI: NEGATIVE for nausea, abdominal pain, heartburn, or change in bowel habits  : NEGATIVE for unusual urinary or vaginal symptoms. Periods are regular.  MUSCULOSKELETAL: NEGATIVE for significant arthralgias or myalgia  NEURO: NEGATIVE for weakness, dizziness or paresthesias  ENDOCRINE: NEGATIVE for temperature intolerance, skin/hair changes  PSYCHIATRIC: NEGATIVE for changes in mood or affect     OBJECTIVE:   BP (!) 134/96   Pulse 104   Temp 98.6  F  "(37  C) (Tympanic)   Resp 18   Ht 1.511 m (4' 11.5\")   Wt 121.6 kg (268 lb)   SpO2 97%   BMI 53.22 kg/m    Physical Exam  GENERAL: healthy, alert and no distress  EYES: Eyes grossly normal to inspection, PERRL and conjunctivae and sclerae normal  HENT: left ear canal and TM normal. +right middle ear with cerumen impaction. nose and mouth without ulcers or lesions  NECK: no adenopathy, no asymmetry, masses, or scars and thyroid normal to palpation  RESP: lungs clear to auscultation - no rales, rhonchi or wheezes  BREAST: normal without masses, tenderness or nipple discharge and no palpable axillary masses or adenopathy. +erythema under breasts   CV: regular rate and rhythm, normal S1 S2, no S3 or S4, no murmur, click or rub, no peripheral edema and peripheral pulses strong  ABDOMEN: soft, nontender, no hepatosplenomegaly, no masses and bowel sounds normal   (female): deferred. Due for annual pap in 3 months. Denies vaginal concerns  MS: no gross musculoskeletal defects noted, no edema  SKIN: no suspicious lesions or rashes  NEURO: Normal strength and tone, mentation intact and speech normal  PSYCH: mentation appears normal, affect normal/bright    Diagnostic Test Results:  Labs reviewed in Epic    ASSESSMENT/PLAN:       ICD-10-CM    1. Routine general medical examination at a health care facility  Z00.00 Comprehensive metabolic panel     Lipid Profile (Chol, Trig, HDL, LDL calc)     TSH with free T4 reflex     CBC with platelets     TDAP VACCINE (Adacel, Boostrix)  [[id number]]     Comprehensive metabolic panel     Lipid Profile (Chol, Trig, HDL, LDL calc)     TSH with free T4 reflex     CBC with platelets     GLUCOSE   2. Abnormal uterine bleeding (AUB)  N93.9 medroxyPROGESTERone (PROVERA) 10 MG tablet   3. Screening for cervical cancer  Z12.4 A Pap thin layer screen with HPV - recommended for age 30 -65   4. Persistent insomnia  G47.00 SLEEP EVALUATION & MANAGEMENT REFERRAL - ADULT -   5. Snoring  R06.83 SLEEP " "EVALUATION & MANAGEMENT REFERRAL - ADULT -   6. Abnormal mammogram  R92.8 MA Diagnostic Bilateral w/Julien   7. Impacted cerumen of right ear  H61.21    8. Yeast infection of the skin  B37.2 nystatin (MYCOSTATIN) 244013 UNIT/GM external powder     Right ear lavage with large amount of cerumen removal per LPN. Symptoms of fullness improved. She wishes for no further work up for dizziness at this time. She feels it is all from her ear. If symptoms don't improve, she will follow up for further work up.     Patient has been advised of split billing requirements and indicates understanding:   COUNSELING:  Reviewed preventive health counseling, as reflected in patient instructions       Regular exercise       Healthy diet/nutrition       Vision screening       Immunizations    Vaccinated for: TDAP and Declined: Pneumococcal due to Other-not interested in taking          Estimated body mass index is 53.22 kg/m  as calculated from the following:    Height as of this encounter: 1.511 m (4' 11.5\").    Weight as of this encounter: 121.6 kg (268 lb).    Weight management plan: Discussed healthy diet and exercise guidelines    She reports that she has been smoking cigarettes. She started smoking about 22 years ago. She has been smoking about 0.50 packs per day. She has never used smokeless tobacco.  Tobacco Cessation Action Plan:   Information offered: Patient not interested at this time      Counseling Resources:  ATP IV Guidelines  Pooled Cohorts Equation Calculator  Breast Cancer Risk Calculator  BRCA-Related Cancer Risk Assessment: FHS-7 Tool  FRAX Risk Assessment  ICSI Preventive Guidelines  Dietary Guidelines for Americans, 2010  USDA's MyPlate  ASA Prophylaxis  Lung CA Screening    Nayla Lund NP  [facility]    "

## 2021-08-19 ENCOUNTER — OFFICE VISIT (OUTPATIENT)
Dept: PSYCHOLOGY | Facility: OTHER | Age: 41
End: 2021-08-19
Attending: COUNSELOR
Payer: COMMERCIAL

## 2021-08-19 DIAGNOSIS — F41.1 GAD (GENERALIZED ANXIETY DISORDER): Primary | ICD-10-CM

## 2021-08-19 DIAGNOSIS — F41.0 PANIC DISORDER: ICD-10-CM

## 2021-08-19 PROCEDURE — 90837 PSYTX W PT 60 MINUTES: CPT | Performed by: COUNSELOR

## 2021-08-20 NOTE — PROGRESS NOTES
The author of this note documented a reason for not sharing it with the patient.    Counseling Progress Note    Client Name: Kayley Pak    Date of Service (when I saw the patient): 08/20/2021    Service Type: Individual Thesisi    Session Start Time: 11:00am  Session End Time: 12:00pm  Session Length: I spent 53+ minutes performing psychotherapy during this office visit.  Session Number: 6    Attendees: Client     Health Maintenance Topics with due status: Overdue       Topic Date Due    ADVANCE CARE PLANNING Never done    Pneumococcal Vaccine: Pediatrics (0 to 5 Years) and At-Risk Patients (6 to 64 Years) Never done    COVID-19 Vaccine Never done    DTAP/TDAP/TD IMMUNIZATION 08/25/2008     Health Maintenance Topics with due status: Due Soon       Topic Date Due    HPV TEST 11/12/2021    PAP 11/12/2021       DSM5 Diagnoses:  300.01 (F41.0) Panic Disorder  300.02 (F41.1) Generalized Anxiety Disorder    Treatment Plan Due Date: Developed 10/10/2021  Diagnostic Assessment Update - Due Date: 7/5/2022    DATA    Treatment Objective(s) Addressed in This Session: Decrease worries, better manage panic attacks, improve physical health, increase relaxation time and ability, improve sleep    Progress on / Status of Treatment Objective(s) / Homework: Kayley reports having no panic attacks over the past week. She is planning to start back at work on September 13. She says she has been doing deep breathing and talking with her mom & boyfriend more when she has struggles. Kayley has also been gearing up to go back to work by starting to do some work (mostly checking emails) from home. She reports continued (huge) issues with insomnia. She is able to initiate sleep but wakes up within a couple hours and repeats this pattern through the night. Kayley reports having vivid dreams, talking and moving in her sleep and sometimes not knowing if something happened or if she dreamt it. Kayley has an upcoming appointment with her PCP and  labs.     Interventions: Explored sleep hygiene and provided psycho education regarding small changes she could try to help her fall back to sleep. Used active listening, reflections & validation.     Current Stressors / Issues: High blood pressure, has a date set to go back to work - she is looking forward to this and has some anxiety about it.    Medication Review: Reviewed    Medication Compliance: Taking medications as prescribed - switching when she takes them to evening.    Changes in Health: High Blood Pressure, needs a physical, is having pain in her left breast and working with her doctor on this. Ruled out any problems with the breast tissue.     Chemical Use Review: No  Substance Use: No    Tobacco Use: YES    ASSESSMENT: Current Emotional / Mental Status (status of significant symptoms):  Risk status no evidence of suicidal or homicidal ideation  Client denies current fears or concerns for personal safety., Client denies current or recent suicidal ideation or behaviors., Client denies current or recent homicidal ideation or behaviors., Client denies current or recent self-injurious behavior or ideation. and Client denies other safety concerns.  A safety and risk management plan has not been developed at this time; however client was given the after-hours number should there be a change in any of these risk factors.    Appearance:                            Appropriate   Eye Contact:                           Good   Psychomotor Behavior:          Normal   Attitude:                                   Cooperative   Orientation:                             All  Speech              Rate / Production:       Normal/ Responsive Normal               Volume:                       Normal   Mood:                                      Normal  relaxed  Affect:                                      Appropriate  Subdued   Thought Content:                    Clear   Thought Form:                        Coherent  Logical    Insight:                                     Good     Collateral Reports Completed: PHQ9 & GAD7    PLAN: Weekly therapy sessions. Check-in on how labs and PCP visit went. Plan to go back to [employer].       Patricia Og, [facility]

## 2021-08-25 ENCOUNTER — OFFICE VISIT (OUTPATIENT)
Dept: FAMILY MEDICINE | Facility: OTHER | Age: 41
End: 2021-08-25
Attending: NURSE PRACTITIONER
Payer: COMMERCIAL

## 2021-08-25 VITALS
RESPIRATION RATE: 18 BRPM | TEMPERATURE: 98.6 F | HEART RATE: 104 BPM | OXYGEN SATURATION: 97 % | BODY MASS INDEX: 52.61 KG/M2 | DIASTOLIC BLOOD PRESSURE: 96 MMHG | HEIGHT: 60 IN | WEIGHT: 268 LBS | SYSTOLIC BLOOD PRESSURE: 134 MMHG

## 2021-08-25 DIAGNOSIS — R92.8 ABNORMAL MAMMOGRAM: ICD-10-CM

## 2021-08-25 DIAGNOSIS — B37.2 YEAST INFECTION OF THE SKIN: ICD-10-CM

## 2021-08-25 DIAGNOSIS — R06.83 SNORING: ICD-10-CM

## 2021-08-25 DIAGNOSIS — Z12.4 SCREENING FOR CERVICAL CANCER: ICD-10-CM

## 2021-08-25 DIAGNOSIS — H61.21 IMPACTED CERUMEN OF RIGHT EAR: ICD-10-CM

## 2021-08-25 DIAGNOSIS — N93.9 ABNORMAL UTERINE BLEEDING (AUB): ICD-10-CM

## 2021-08-25 DIAGNOSIS — Z00.00 ROUTINE GENERAL MEDICAL EXAMINATION AT A HEALTH CARE FACILITY: Primary | ICD-10-CM

## 2021-08-25 DIAGNOSIS — G47.00 PERSISTENT INSOMNIA: ICD-10-CM

## 2021-08-25 LAB
ALBUMIN SERPL-MCNC: 3.5 G/DL (ref 3.4–5)
ALP SERPL-CCNC: 109 U/L (ref 40–150)
ALT SERPL W P-5'-P-CCNC: 62 U/L (ref 0–50)
ANION GAP SERPL CALCULATED.3IONS-SCNC: 6 MMOL/L (ref 3–14)
AST SERPL W P-5'-P-CCNC: 82 U/L (ref 0–45)
BILIRUB SERPL-MCNC: 0.7 MG/DL (ref 0.2–1.3)
BUN SERPL-MCNC: 17 MG/DL (ref 7–30)
CALCIUM SERPL-MCNC: 9.2 MG/DL (ref 8.5–10.1)
CHLORIDE BLD-SCNC: 105 MMOL/L (ref 94–109)
CHOLEST SERPL-MCNC: 194 MG/DL
CO2 SERPL-SCNC: 25 MMOL/L (ref 20–32)
CREAT SERPL-MCNC: 0.94 MG/DL (ref 0.52–1.04)
ERYTHROCYTE [DISTWIDTH] IN BLOOD BY AUTOMATED COUNT: 16.2 % (ref 10–15)
FASTING STATUS PATIENT QL REPORTED: YES
GFR SERPL CREATININE-BSD FRML MDRD: 76 ML/MIN/1.73M2
GLUCOSE BLD-MCNC: 105 MG/DL (ref 70–99)
HCT VFR BLD AUTO: 42.4 % (ref 35–47)
HDLC SERPL-MCNC: 26 MG/DL
HGB BLD-MCNC: 14 G/DL (ref 11.7–15.7)
LDLC SERPL CALC-MCNC: 92 MG/DL
MCH RBC QN AUTO: 28.7 PG (ref 26.5–33)
MCHC RBC AUTO-ENTMCNC: 33 G/DL (ref 31.5–36.5)
MCV RBC AUTO: 87 FL (ref 78–100)
NONHDLC SERPL-MCNC: 168 MG/DL
PLATELET # BLD AUTO: 264 10E3/UL (ref 150–450)
POTASSIUM BLD-SCNC: 4.2 MMOL/L (ref 3.4–5.3)
PROT SERPL-MCNC: 8.1 G/DL (ref 6.8–8.8)
RBC # BLD AUTO: 4.88 10E6/UL (ref 3.8–5.2)
SODIUM SERPL-SCNC: 136 MMOL/L (ref 133–144)
TRIGL SERPL-MCNC: 382 MG/DL
TSH SERPL DL<=0.005 MIU/L-ACNC: 1.97 MU/L (ref 0.4–4)
WBC # BLD AUTO: 7.9 10E3/UL (ref 4–11)

## 2021-08-25 PROCEDURE — 85027 COMPLETE CBC AUTOMATED: CPT | Performed by: NURSE PRACTITIONER

## 2021-08-25 PROCEDURE — 36415 COLL VENOUS BLD VENIPUNCTURE: CPT | Performed by: NURSE PRACTITIONER

## 2021-08-25 PROCEDURE — 99396 PREV VISIT EST AGE 40-64: CPT | Mod: 25 | Performed by: NURSE PRACTITIONER

## 2021-08-25 PROCEDURE — 80053 COMPREHEN METABOLIC PANEL: CPT | Performed by: NURSE PRACTITIONER

## 2021-08-25 PROCEDURE — 84443 ASSAY THYROID STIM HORMONE: CPT | Performed by: NURSE PRACTITIONER

## 2021-08-25 PROCEDURE — 90715 TDAP VACCINE 7 YRS/> IM: CPT | Performed by: NURSE PRACTITIONER

## 2021-08-25 PROCEDURE — 90471 IMMUNIZATION ADMIN: CPT | Performed by: NURSE PRACTITIONER

## 2021-08-25 PROCEDURE — 80061 LIPID PANEL: CPT | Performed by: NURSE PRACTITIONER

## 2021-08-25 RX ORDER — MEDROXYPROGESTERONE ACETATE 10 MG
10 TABLET ORAL DAILY
Qty: 30 TABLET | Refills: 0 | Status: SHIPPED | OUTPATIENT
Start: 2021-08-25 | End: 2022-10-03

## 2021-08-25 RX ORDER — NYSTATIN 100000 [USP'U]/G
POWDER TOPICAL 2 TIMES DAILY
Qty: 60 G | Refills: 0 | Status: SHIPPED | OUTPATIENT
Start: 2021-08-25 | End: 2023-10-26

## 2021-08-25 ASSESSMENT — PAIN SCALES - GENERAL: PAINLEVEL: MILD PAIN (3)

## 2021-08-25 ASSESSMENT — ANXIETY QUESTIONNAIRES
1. FEELING NERVOUS, ANXIOUS, OR ON EDGE: NEARLY EVERY DAY
6. BECOMING EASILY ANNOYED OR IRRITABLE: SEVERAL DAYS
GAD7 TOTAL SCORE: 16
7. FEELING AFRAID AS IF SOMETHING AWFUL MIGHT HAPPEN: MORE THAN HALF THE DAYS
5. BEING SO RESTLESS THAT IT IS HARD TO SIT STILL: MORE THAN HALF THE DAYS
4. TROUBLE RELAXING: MORE THAN HALF THE DAYS
3. WORRYING TOO MUCH ABOUT DIFFERENT THINGS: NEARLY EVERY DAY
2. NOT BEING ABLE TO STOP OR CONTROL WORRYING: NEARLY EVERY DAY

## 2021-08-25 ASSESSMENT — MIFFLIN-ST. JEOR: SCORE: 1794.2

## 2021-08-25 NOTE — LETTER
August 30, 2021      Kayley Pak  [address] [place] [address]        Dear ,    We are writing to inform you of your test results.    Triglycerides are elevated. Good cholesterol (HDL) is low. Increase good cholesterol (nuts, omegas, etc..) in diet. Liver enzymes with slight inflammation. Work on diet and exercise. Recheck in 1 year.     Resulted Orders   Comprehensive metabolic panel   Result Value Ref Range    Sodium 136 133 - 144 mmol/L    Potassium 4.2 3.4 - 5.3 mmol/L    Chloride 105 94 - 109 mmol/L    Carbon Dioxide (CO2) 25 20 - 32 mmol/L    Anion Gap 6 3 - 14 mmol/L    Urea Nitrogen 17 7 - 30 mg/dL    Creatinine 0.94 0.52 - 1.04 mg/dL    Calcium 9.2 8.5 - 10.1 mg/dL    Glucose 105 (H) 70 - 99 mg/dL    Alkaline Phosphatase 109 40 - 150 U/L    AST 82 (H) 0 - 45 U/L    ALT 62 (H) 0 - 50 U/L    Protein Total 8.1 6.8 - 8.8 g/dL    Albumin 3.5 3.4 - 5.0 g/dL    Bilirubin Total 0.7 0.2 - 1.3 mg/dL    GFR Estimate 76 >60 mL/min/1.73m2      Comment:      As of July 11, 2021, eGFR is calculated by the CKD-EPI creatinine equation, without race adjustment. eGFR can be influenced by muscle mass, exercise, and diet. The reported eGFR is an estimation only and is only applicable if the renal function is stable.   Lipid Profile (Chol, Trig, HDL, LDL calc)   Result Value Ref Range    Cholesterol 194 <200 mg/dL      Comment:      Age 0-19 years  Desirable: <170 mg/dL  Borderline high:  170-199 mg/dl  High:            >199 mg/dl    Age 20 years and older  Desirable: <200 mg/dL    Triglycerides 382 (H) <150 mg/dL      Comment:      0-9 years:  Normal:    Less than 75 mg/dL  Borderline high:  75-99 mg/dL  High:             Greater than or equal to 100 mg/dL    0-19 years:  Normal:    Less than 90 mg/dL  Borderline high:   mg/dL  High:             Greater than or equal to 130 mg/dL    20 years and older:  Normal:    Less than 150 mg/dL  Borderline high:  150-199 mg/dL  High:             200-499  mg/dL  Very high:   Greater than or equal to 500 mg/dL    Direct Measure HDL 26 (L) >=50 mg/dL      Comment:      0-19 years:       Greater than or equal to 45 mg/dL   Low: Less than 40 mg/dL   Borderline low: 40-44 mg/dL     20 years and older:   Female: Greater than or equal to 50 mg/dL   Male:   Greater than or equal to 40 mg/dL         LDL Cholesterol Calculated 92 <=100 mg/dL      Comment:      Age 0-19 years:  Desirable: 0-110 mg/dL   Borderline high: 110-129 mg/dL   High: >= 130 mg/dL    Age 20 years and older:  Desirable: <100mg/dL  Above desirable: 100-129 mg/dL   Borderline high: 130-159 mg/dL   High: 160-189 mg/dL   Very high: >= 190 mg/dL    Non HDL Cholesterol 168 (H) <130 mg/dL      Comment:      0-19 years:  Desirable:          Less than 120 mg/dL  Borderline high:   120-144 mg/dL  High:                   Greater than or equal to 145 mg/dL    20 years and older:  Desirable:          130 mg/dL  Above Desirable: 130-159 mg/dL  Borderline high:   160-189 mg/dL  High:               190-219 mg/dL  Very high:     Greater than or equal to 220 mg/dL    Patient Fasting > 8hrs? Yes    TSH with free T4 reflex   Result Value Ref Range    TSH 1.97 0.40 - 4.00 mU/L   CBC with platelets   Result Value Ref Range    WBC Count 7.9 4.0 - 11.0 10e3/uL    RBC Count 4.88 3.80 - 5.20 10e6/uL    Hemoglobin 14.0 11.7 - 15.7 g/dL    Hematocrit 42.4 35.0 - 47.0 %    MCV 87 78 - 100 fL    MCH 28.7 26.5 - 33.0 pg    MCHC 33.0 31.5 - 36.5 g/dL    RDW 16.2 (H) 10.0 - 15.0 %    Platelet Count 264 150 - 450 10e3/uL       If you have any questions or concerns, please call the clinic at the number listed above.       Sincerely,      Nayla Lund NP

## 2021-08-25 NOTE — NURSING NOTE
"Chief Complaint   Patient presents with     Physical       Initial BP (!) 134/96   Pulse 104   Temp 98.6  F (37  C) (Tympanic)   Resp 18   Ht 1.511 m (4' 11.5\")   Wt 121.6 kg (268 lb)   SpO2 97%   BMI 53.22 kg/m   Estimated body mass index is 53.22 kg/m  as calculated from the following:    Height as of this encounter: 1.511 m (4' 11.5\").    Weight as of this encounter: 121.6 kg (268 lb).  Medication Reconciliation: complete   Bilateral Cerumenosis is noted.  Wax is removed by syringing and manual debridement. Instructions for home care to prevent wax buildup are given.    Joy Arthur LPN    "

## 2021-08-26 ASSESSMENT — ANXIETY QUESTIONNAIRES: GAD7 TOTAL SCORE: 16

## 2021-09-04 ENCOUNTER — HEALTH MAINTENANCE LETTER (OUTPATIENT)
Age: 41
End: 2021-09-04

## 2021-09-22 ENCOUNTER — DOCUMENTATION ONLY (OUTPATIENT)
Dept: SLEEP MEDICINE | Facility: HOSPITAL | Age: 41
End: 2021-09-22

## 2021-09-22 NOTE — PROGRESS NOTES
ANNELIESE PRADO       Name: Kayley Pak MRN# [id number]   Age: 41 year old YOB: 1980     Stop Bang questionnaire completed with a score of >3 to allow for HST     Have you been told you snore loudly (louder than talking or loud enough to be heard through doors)? YES    Do you often feel tired, fatigued, or sleepy during the daytime? YES    Has anyone observed you stop breathing during your sleep? NO    Do you have or are you being treated for high blood pressure? YES    Is your BMI greater than 35? YES (54.1)    Is your neck size circumference 16 inches or greater? YES    Are you over 50 years old? NO    Stop Bang Score (# of yes): 5
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
## 2021-09-23 NOTE — PROGRESS NOTES
Chart review prior to sleep testing.    Patient Summary:  41 year old yo female who is referred for chronic insomnia and abnormal nocturnal behaviors.    Patient Active Problem List    Diagnosis Date Noted     Tobacco abuse 06/21/2021     Priority: Medium     Morbid obesity (H) 11/12/2020     Priority: Medium     Atypical nevus of foot, left 03/02/2018     Priority: Medium     Atypical nevus 12/22/2017     Priority: Medium     Eczema, unspecified type 12/22/2017     Priority: Medium     Essential hypertension 12/22/2017     Priority: Medium     LISA (generalized anxiety disorder) 12/22/2017     Priority: Medium     Panic attack 12/22/2017     Priority: Medium     Pes anserine bursitis 08/25/2015     Priority: Medium     Right knee pain 07/14/2015     Priority: Medium     Contraceptive management 04/10/2013     Priority: Medium     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     Priority: Medium     Dyslipidemia 08/07/2012     Priority: Medium     Low HDL, high triglycerides       CARDIOVASCULAR SCREENING; LDL GOAL LESS THAN 160 10/31/2010     Priority: Medium     Seasonal allergic rhinitis 08/04/2010     Priority: Medium     Family history of diabetes mellitus      Priority: Medium       Current Outpatient Medications   Medication     DULoxetine (CYMBALTA) 30 MG capsule     etonogestrel (NEXPLANON) 68 MG IMPL     fexofenadine (ALLEGRA) 180 MG tablet     hydrOXYzine (ATARAX) 25 MG tablet     losartan (COZAAR) 50 MG tablet     medroxyPROGESTERone (PROVERA) 10 MG tablet     nicotine (NICODERM CQ) 14 MG/24HR 24 hr patch     nicotine (NICODERM CQ) 21 MG/24HR 24 hr patch     nicotine (NICODERM CQ) 7 MG/24HR 24 hr patch     nystatin (MYCOSTATIN) 621591 UNIT/GM external powder     propranolol ER (INDERAL LA) 160 MG 24 hr capsule     No current facility-administered medications for this visit.     Pertinent PMHx of HTN, anxiety, LISA, morbid obesity.    STOP-BANG score of 5, with unknown neck circumference.  Milligan score of 16.  BMI of  "Estimated body mass index is 53.22 kg/m  as calculated from the following:    Height as of 8/25/21: 1.511 m (4' 11.5\").    Weight as of 8/25/21: 121.6 kg (268 lb).     Per questionnaire: \"Unable to sleep and sleep walking and talking in sleep\"    Sxs for 2 months.    Caffeine use:  No for 3+ per day.  No for within 6 hours of bed.    Tobacco use: Yes    Sleep pattern:  Workdays.  9pm - 6am, total sleep time 3-4 hours.  Weekends.  ?, total sleep time 5 hours.  Time to fall asleep: ~2 hours.  Awakenings: \"every hour\" times per night, 60 minutes to return to sleep, awake for total of 3 hours.  Napping.  0 days per week, - hours per nap.    Yes for RLS screen.  Yes for sleep walking.  Yes for dream enactment behavior.  Yes for bruxism.  Behaviors described as talking / jumping / hitting and occurring 3-4x / week.    Yes for morning headaches.  Yes for snoring.  No for observed apnea.  No for FHx of JONATHAN.    SHx:  Single, lives with son and boyfriend.  Works as [employer] .    A/P:  1.)  High likelihood of JONATHAN with STOP-BANG score of 5.  2.)  Increased risk for hypoventilation with BMI ~53  3.)  Unclear abnormal nocturnal behaviors  4.)  Unclear sleep pattern on weekends, unclear sleep behaviors during awakenings   - Given elevated BMI and unclear abnormal nocturnal behaviors, recommend either clinic consult or in-lab PSG with end-tidal capnography with pre-study VBG.    ---  This note was written with the assistance of the Dragon voice-dictation technology software. The final document, although reviewed, may contain errors. For corrections, please contact the office.    Marlo Carver MD    Sleep Medicine  [facility]  ([phone])  [facility]  ([phone])    "

## 2021-10-05 DIAGNOSIS — F41.1 GAD (GENERALIZED ANXIETY DISORDER): ICD-10-CM

## 2021-10-06 RX ORDER — HYDROXYZINE HYDROCHLORIDE 25 MG/1
TABLET, FILM COATED ORAL
Qty: 60 TABLET | Refills: 0 | Status: SHIPPED | OUTPATIENT
Start: 2021-10-06 | End: 2022-02-15

## 2021-10-14 ENCOUNTER — TELEPHONE (OUTPATIENT)
Dept: SLEEP MEDICINE | Facility: HOSPITAL | Age: 41
End: 2021-10-14

## 2021-10-25 NOTE — PROGRESS NOTES
"Kayley Pak is a 41 year old female who is being evaluated via a billable video visit.       The patient has been notified of following:      \"This video visit will be conducted via a call between you and your physician/provider. We have found that certain health care needs can be provided without the need for an in-person physical exam.  This service lets us provide the care you need with a video conversation.  If a prescription is necessary we can send it directly to your pharmacy.  If lab work is needed we can place an order for that and you can then stop by our lab to have the test done at a later time.     Video visits are billed at different rates depending on your insurance coverage.  Please reach out to your insurance provider with any questions.     If during the course of the call the physician/provider feels a video visit is not appropriate, you will not be charged for this service.\"     Patient has given verbal consent for Video visit? Yes  How would you like to obtain your AVS? Mail a copy  If you are dropped from the video visit, the video invite should be resent to: Text to cell phone: [phone]  Will anyone else be joining your video visit? No  If patient encounters technical issues they should call [phone]      Video-Visit Details     Type of service:  Video Visit     Video Start Time: 1130  Video End Time: 1155    Originating Location (pt. Location): Home     Distant Location (provider location):  [facility]      Platform used for Video Visit: DoximTellWise    Virtual visit for insomnia, concerns for sleep disordered breathing.     A/P:  1.)  High likelihood of JONATHAN with STOP-BANG score of 5.  2.)  Increased risk for hypoventilation with BMI ~53  3.)  Unclear abnormal nocturnal behaviors-though seems to represent nightmare disorder, non-REM parasomnia  4.)  Unclear sleep pattern on weekends, unclear sleep behaviors during awakenings   - Given elevated BMI and " "unclear abnormal nocturnal behaviors, Plan for diagnostic in-lab PSG with end-tidal CO2 and 4-limb EMG recording.  Pre-study VBG also entered.  Patient was hesitant to consider PAP therapy initially, hence ordered as diagnostic.  -We will also send prescription for zolpidem 5 mg x 1 for use on the night of the sleep test.    SUBJECTIVE:  Kayley Pak is a 41 year old year old female.    41 year old yo female who is referred for chronic insomnia and abnormal nocturnal behaviors.    Pertinent PMHx of HTN, anxiety, LISA, morbid obesity.     STOP-BANG score of 5, with unknown neck circumference.  Harpster score of 16.  BMI of Estimated body mass index is 53.22 kg/m  as calculated from the following:    Height as of 8/25/21: 1.511 m (4' 11.5\").    Weight as of 8/25/21: 121.6 kg (268 lb).      Today-we reviewed her sleep concerns in more detail.  Her sleep concerns are loud disruptive snoring, worsening insomnia over the last 4-5 months, and somewhat variable reports of nighttime behaviors that she does not recall.    She denies having previous issues with chronic insomnia, this seems related to being on medical leave for anxiety, and reportedly low iron levels.    Before her current insomnia episode, she would typically get in bed around 9 PM and falls within 30 minutes, have 1 awakening to use the bathroom that was typically brief, and then awaken at 6-7 AM.    It was somewhat difficult or unclear to understand her current sleep-wake pattern or when sleep was happening during the nighttime.  She also describes a variety of abnormal nocturnal behaviors, ranging from recollection of vivid nightmares to what sounds like night terrors with getting up and leaving the bed and jumping over a couch and seeming to be able to navigate her environment.  I did not get a feeling of significant dream enacting behavior, though it is challenging.  She denies any history of sleepwalking or night terrors as a child.  Is also somewhat " "unclear to me when these events for started.  During the night, she feels these are most likely to happen in the early morning hours.  She does not feel this is all related to her duloxetine, she has been on that medication for a few years.    Per questionnaire: \"Unable to sleep and sleep walking and talking in sleep\"     Sxs for 2 months.     Caffeine use:  No for 3+ per day.  No for within 6 hours of bed.     Tobacco use: Yes     Sleep pattern:  Workdays.  9pm - 6am, total sleep time 3-4 hours.  Weekends.  ?, total sleep time 5 hours.  Time to fall asleep: ~2 hours.  Awakenings: \"every hour\" times per night, 60 minutes to return to sleep, awake for total of 3 hours.  Napping.  0 days per week, - hours per nap.     Yes for RLS screen.  Yes for sleep walking.  Yes for dream enactment behavior.  Yes for bruxism.  Behaviors described as talking / jumping / hitting and occurring 3-4x / week.     Yes for morning headaches.  Yes for snoring.  No for observed apnea.  No for FHx of JONATHAN.     SHx:  Single, lives with son and boyfriend.  Works as [employer] .         Past medical history:    Patient Active Problem List    Diagnosis Date Noted     Tobacco abuse 06/21/2021     Priority: Medium     Morbid obesity (H) 11/12/2020     Priority: Medium     Atypical nevus of foot, left 03/02/2018     Priority: Medium     Atypical nevus 12/22/2017     Priority: Medium     Eczema, unspecified type 12/22/2017     Priority: Medium     Essential hypertension 12/22/2017     Priority: Medium     LISA (generalized anxiety disorder) 12/22/2017     Priority: Medium     Panic attack 12/22/2017     Priority: Medium     Pes anserine bursitis 08/25/2015     Priority: Medium     Right knee pain 07/14/2015     Priority: Medium     Contraceptive management 04/10/2013     Priority: Medium     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     Priority: Medium     Dyslipidemia 08/07/2012     Priority: Medium     Low HDL, high triglycerides       CARDIOVASCULAR " SCREENING; LDL GOAL LESS THAN 160 10/31/2010     Priority: Medium     Seasonal allergic rhinitis 08/04/2010     Priority: Medium     Family history of diabetes mellitus      Priority: Medium       10 point ROS of systems including Constitutional, Eyes, Respiratory, Cardiovascular, Gastroenterology, Genitourinary, Integumentary, Muscularskeletal, Psychiatric were all negative except for pertinent positives noted in my HPI.    Current Outpatient Medications   Medication Sig Dispense Refill     hydrOXYzine (ATARAX) 25 MG tablet TAKE 1 TABLET BY MOUTH 3 TIMES DAILY AS NEEDED FOR ANXIETY 60 tablet 0     DULoxetine (CYMBALTA) 30 MG capsule Take 3 capsules (90 mg) by mouth daily 270 capsule 0     etonogestrel (NEXPLANON) 68 MG IMPL Inject 68 mg Subcutaneous Inserted 7/9/2020       fexofenadine (ALLEGRA) 180 MG tablet Take 1 tablet (180 mg) by mouth daily       losartan (COZAAR) 50 MG tablet Take 1 tablet (50 mg) by mouth daily 90 tablet 1     medroxyPROGESTERone (PROVERA) 10 MG tablet Take 1 tablet (10 mg) by mouth daily 30 tablet 0     nicotine (NICODERM CQ) 14 MG/24HR 24 hr patch Place 1 patch onto the skin every 24 hours 30 patch 0     nicotine (NICODERM CQ) 21 MG/24HR 24 hr patch Place 1 patch onto the skin every 24 hours 30 patch 0     nicotine (NICODERM CQ) 7 MG/24HR 24 hr patch Place 1 patch onto the skin every 24 hours 30 patch 0     nystatin (MYCOSTATIN) 568510 UNIT/GM external powder Apply topically 2 times daily 60 g 0     propranolol ER (INDERAL LA) 160 MG 24 hr capsule Take 1 capsule (160 mg) by mouth daily 30 capsule 1       OBJECTIVE:  There were no vitals taken for this visit.    Physical Exam     ---  This note was written with the assistance of the Dragon voice-dictation technology software. The final document, although reviewed, may contain errors. For corrections, please contact the office.    Marlo Carver MD    Sleep Medicine  [facility]  ([phone])  M  [facility]  ([phone])

## 2021-10-26 ENCOUNTER — VIRTUAL VISIT (OUTPATIENT)
Dept: SLEEP MEDICINE | Facility: HOSPITAL | Age: 41
End: 2021-10-26
Attending: FAMILY MEDICINE
Payer: COMMERCIAL

## 2021-10-26 DIAGNOSIS — G47.00 INSOMNIA, UNSPECIFIED TYPE: ICD-10-CM

## 2021-10-26 DIAGNOSIS — R06.83 SNORING: ICD-10-CM

## 2021-10-26 DIAGNOSIS — F41.0 PANIC ATTACK: ICD-10-CM

## 2021-10-26 DIAGNOSIS — F41.1 GAD (GENERALIZED ANXIETY DISORDER): ICD-10-CM

## 2021-10-26 DIAGNOSIS — E66.01 MORBID OBESITY (H): ICD-10-CM

## 2021-10-26 DIAGNOSIS — G47.50 PARASOMNIA, UNSPECIFIED TYPE: ICD-10-CM

## 2021-10-26 DIAGNOSIS — I10 ESSENTIAL HYPERTENSION: Primary | ICD-10-CM

## 2021-10-26 PROCEDURE — 99204 OFFICE O/P NEW MOD 45 MIN: CPT | Mod: 95 | Performed by: FAMILY MEDICINE

## 2021-10-26 NOTE — PROGRESS NOTES
"Kayley is a 41 year old who is being evaluated via a billable video visit.      How would you like to obtain your AVS? MyChart  If the video visit is dropped, the invitation should be resent by: Text to cell phone: [phone]  Will anyone else be joining your video visit? No  {If patient encounters technical issues they should call [phone] :[id number]}    Video Start Time: {video visit start/end time for provider to select:152948}    {PROVIDER CHARTING PREFERENCE:922243}    Subjective   Kayley is a 41 year old who presents for the following health issues {ACCOMPANIED BY STATEMENT (Optional):722297}    HPI     ***    Review of Systems   {ROS COMP (Optional):352713}      Objective           Vitals:  No vitals were obtained today due to virtual visit.    Physical Exam   {video visit exam brief selected:657443::\"GENERAL: Healthy, alert and no distress\",\"EYES: Eyes grossly normal to inspection.  No discharge or erythema, or obvious scleral/conjunctival abnormalities.\",\"RESP: No audible wheeze, cough, or visible cyanosis.  No visible retractions or increased work of breathing.  \",\"SKIN: Visible skin clear. No significant rash, abnormal pigmentation or lesions.\",\"NEURO: Cranial nerves grossly intact.  Mentation and speech appropriate for age.\",\"PSYCH: Mentation appears normal, affect normal/bright, judgement and insight intact, normal speech and appearance well-groomed.\"}    {Diagnostic Test Results (Optional):458515}    {AMBULATORY ATTESTATION (Optional):918573}        Video-Visit Details    Type of service:  Video Visit    Video End Time:{video visit start/end time for provider to select:152948}    Originating Location (pt. Location): {video visit patient location:970688::\"Home\"}    Distant Location (provider location):  [facility]     Platform used for Video Visit: {Virtual Visit Platforms:888363::\"AmWell\"}  "

## 2021-10-27 RX ORDER — ZOLPIDEM TARTRATE 5 MG/1
5 TABLET ORAL
Qty: 1 TABLET | Refills: 0 | Status: SHIPPED | OUTPATIENT
Start: 2021-10-27 | End: 2022-02-15

## 2021-11-04 DIAGNOSIS — G47.33 OSA (OBSTRUCTIVE SLEEP APNEA): Primary | ICD-10-CM

## 2021-11-05 ENCOUNTER — NURSE TRIAGE (OUTPATIENT)
Dept: FAMILY MEDICINE | Facility: OTHER | Age: 41
End: 2021-11-05

## 2021-11-05 DIAGNOSIS — Z20.822 EXPOSURE TO 2019 NOVEL CORONAVIRUS: Primary | ICD-10-CM

## 2021-11-05 NOTE — TELEPHONE ENCOUNTER
Reason for Disposition    [1] CLOSE CONTACT COVID-19 EXPOSURE within last 14 days AND [2] NO symptoms    Additional Information    Negative: COVID-19 lab test positive    Negative: [1] Lives with someone known to have influenza (flu test positive) AND [2] flu-like symptoms (e.g., cough, runny nose, sore throat, SOB; with or without fever)    Negative: [1] Symptoms of COVID-19 (e.g., cough, fever, SOB, or others) AND [2] HCP diagnosed COVID-19 based on symptoms    Negative: [1] Symptoms of COVID-19 (e.g., cough, fever, SOB, or others) AND [2] lives in an area with community spread    Negative: [1] Symptoms of COVID-19 (e.g., cough, fever, SOB, or others) AND [2] within 14 days of EXPOSURE (close contact) with diagnosed or suspected COVID-19 patient    Negative: [1] Symptoms of COVID-19 (e.g., cough, fever, SOB, or others) AND [2] within 14 days of travel from high-risk area for COVID-19 community spread (identified by CDC)    Negative: [1] Difficulty breathing (shortness of breath) occurs AND [2] onset > 14 days after COVID-19 EXPOSURE (Close Contact)    Negative: [1] Dry cough occurs AND [2] onset > 14 days after COVID-19 EXPOSURE    Negative: [1] Wet cough (i.e., white-yellow, yellow, green, or gaurav colored sputum) AND [2] onset > 14 days after COVID-19 EXPOSURE    Negative: [1] Common cold symptoms AND [2] onset > 14 days after COVID-19 EXPOSURE    Negative: COVID-19 vaccine reaction suspected (e.g., fever, headache, muscle aches) occurring during days 1-3 after getting vaccine    Negative: COVID-19 vaccine, questions about    Negative: [1] CLOSE CONTACT COVID-19 EXPOSURE within last 14 days AND [2] needs COVID-19 lab test to return to work AND [3] NO symptoms    Negative: [1] CLOSE CONTACT COVID-19 EXPOSURE within last 14 days AND [2] exposed person is a  (e.g., police or paramedic) AND [3] NO symptoms    Negative: [1] CLOSE CONTACT COVID-19 EXPOSURE within last 14 days AND [2] exposed person  "is a healthcare worker who was NOT using all recommended personal protective equipment (e.g., a respirator-N95 mask, eye protection, gloves, and gown) AND [3] NO symptoms    Negative: [1] Living or working in a correctional facility, long-term care facility, or shelter (i.e., congregate setting; densely populated) AND [2] where an outbreak has occurred AND [3] NO symptoms    Answer Assessment - Initial Assessment Questions  1. COVID-19 EXPOSURE: \"Please describe how you were exposed to someone with a COVID-19 infection.\"      boyfriend  2. PLACE of CONTACT: \"Where were you when you were exposed to COVID-19?\" (e.g., home, school, medical waiting room; which city?)      Home   3. TYPE of CONTACT: \"How much contact was there?\" (e.g., sitting next to, live in same house, work in same office, same building)      Lives together  4. DURATION of CONTACT: \"How long were you in contact with the COVID-19 patient?\" (e.g., a few seconds, passed by person, a few minutes, 15 minutes or longer, live with the patient)      Lives together  5. MASK: \"Were you wearing a mask?\" \"Was the other person wearing a mask?\" Note: wearing a mask reduces the risk of an otherwise close contact.      no  6. DATE of CONTACT: \"When did you have contact with a COVID-19 patient?\" (e.g., how many days ago)      today  7. COMMUNITY SPREAD: \"Are there lots of cases of COVID-19 (community spread) where you live?\" (See public health department website, if unsure)        yes  8. SYMPTOMS: \"Do you have any symptoms?\" (e.g., fever, cough, breathing difficulty, loss of taste or smell)      sinus  9. PREGNANCY OR POSTPARTUM: \"Is there any chance you are pregnant?\" \"When was your last menstrual period?\" \"Did you deliver in the last 2 weeks?\"      no  10. HIGH RISK: \"Do you have any heart or lung problems?\" \"Do you have a weak immune system?\" (e.g., heart failure, COPD, asthma, HIV positive, chemotherapy, renal failure, diabetes mellitus, sickle cell anemia, " "obesity)        no  11. TRAVEL: \"Have you traveled out of the country recently?\" If Yes, ask: \"When and where?\" Also ask about out-of-state travel, since the CDC has identified some high-risk cities for community spread in the . Note: Travel becomes less relevant if there is widespread community transmission where the patient lives.        no    Protocols used: CORONAVIRUS (COVID-19) EXPOSURE-A- 8.25.2021      "

## 2021-11-06 ENCOUNTER — OFFICE VISIT (OUTPATIENT)
Dept: FAMILY MEDICINE | Facility: OTHER | Age: 41
End: 2021-11-06
Attending: NURSE PRACTITIONER
Payer: COMMERCIAL

## 2021-11-06 DIAGNOSIS — Z20.822 EXPOSURE TO 2019 NOVEL CORONAVIRUS: ICD-10-CM

## 2021-11-06 PROCEDURE — U0003 INFECTIOUS AGENT DETECTION BY NUCLEIC ACID (DNA OR RNA); SEVERE ACUTE RESPIRATORY SYNDROME CORONAVIRUS 2 (SARS-COV-2) (CORONAVIRUS DISEASE [COVID-19]), AMPLIFIED PROBE TECHNIQUE, MAKING USE OF HIGH THROUGHPUT TECHNOLOGIES AS DESCRIBED BY CMS-2020-01-R: HCPCS

## 2021-11-06 PROCEDURE — U0005 INFEC AGEN DETEC AMPLI PROBE: HCPCS

## 2021-11-07 LAB — SARS-COV-2 RNA RESP QL NAA+PROBE: NEGATIVE

## 2021-12-03 DIAGNOSIS — I10 ESSENTIAL HYPERTENSION: ICD-10-CM

## 2021-12-03 RX ORDER — PROPRANOLOL HYDROCHLORIDE 160 MG/1
160 CAPSULE, EXTENDED RELEASE ORAL DAILY
Qty: 30 CAPSULE | Refills: 0 | Status: SHIPPED | OUTPATIENT
Start: 2021-12-03 | End: 2021-12-30

## 2021-12-30 DIAGNOSIS — I10 ESSENTIAL HYPERTENSION: ICD-10-CM

## 2021-12-30 RX ORDER — PROPRANOLOL HYDROCHLORIDE 160 MG/1
CAPSULE, EXTENDED RELEASE ORAL
Qty: 30 CAPSULE | Refills: 0 | Status: SHIPPED | OUTPATIENT
Start: 2021-12-30 | End: 2022-02-02

## 2021-12-30 NOTE — TELEPHONE ENCOUNTER
Inderal      Last Written Prescription Date:  12/3/21  Last Fill Quantity: 30,   # refills: 0  Last Office Visit: 8/25/21  Future Office visit:       Routing refill request to provider for review/approval because:

## 2022-02-01 DIAGNOSIS — I10 ESSENTIAL HYPERTENSION: ICD-10-CM

## 2022-02-03 RX ORDER — PROPRANOLOL HYDROCHLORIDE 160 MG/1
CAPSULE, EXTENDED RELEASE ORAL
Qty: 30 CAPSULE | Refills: 0 | OUTPATIENT
Start: 2022-02-03

## 2022-02-08 ENCOUNTER — OFFICE VISIT (OUTPATIENT)
Dept: FAMILY MEDICINE | Facility: OTHER | Age: 42
End: 2022-02-08
Attending: NURSE PRACTITIONER
Payer: COMMERCIAL

## 2022-02-08 VITALS
WEIGHT: 272.1 LBS | SYSTOLIC BLOOD PRESSURE: 132 MMHG | BODY MASS INDEX: 54.04 KG/M2 | RESPIRATION RATE: 18 BRPM | TEMPERATURE: 98.7 F | DIASTOLIC BLOOD PRESSURE: 88 MMHG | OXYGEN SATURATION: 97 % | HEART RATE: 72 BPM

## 2022-02-08 DIAGNOSIS — F41.1 GAD (GENERALIZED ANXIETY DISORDER): ICD-10-CM

## 2022-02-08 DIAGNOSIS — M75.81 ROTATOR CUFF TENDONITIS, RIGHT: Primary | ICD-10-CM

## 2022-02-08 DIAGNOSIS — I10 ESSENTIAL HYPERTENSION: ICD-10-CM

## 2022-02-08 PROCEDURE — 99214 OFFICE O/P EST MOD 30 MIN: CPT | Performed by: NURSE PRACTITIONER

## 2022-02-08 RX ORDER — CYCLOBENZAPRINE HCL 10 MG
10 TABLET ORAL EVERY 8 HOURS PRN
Qty: 30 TABLET | Refills: 0 | Status: SHIPPED | OUTPATIENT
Start: 2022-02-08 | End: 2022-10-03

## 2022-02-08 RX ORDER — OMEPRAZOLE 10 MG/1
20 CAPSULE, DELAYED RELEASE ORAL DAILY
COMMUNITY
End: 2022-10-11

## 2022-02-08 RX ORDER — AMOXICILLIN 500 MG
1200 CAPSULE ORAL DAILY
COMMUNITY
End: 2022-10-03

## 2022-02-08 RX ORDER — PREDNISONE 20 MG/1
TABLET ORAL
Qty: 10 TABLET | Refills: 0 | Status: SHIPPED | OUTPATIENT
Start: 2022-02-08 | End: 2022-02-15

## 2022-02-08 RX ORDER — DULOXETIN HYDROCHLORIDE 30 MG/1
30 CAPSULE, DELAYED RELEASE ORAL DAILY
Qty: 90 CAPSULE | Refills: 0 | Status: SHIPPED | OUTPATIENT
Start: 2022-02-08 | End: 2022-07-25

## 2022-02-08 ASSESSMENT — PAIN SCALES - GENERAL: PAINLEVEL: SEVERE PAIN (6)

## 2022-02-08 ASSESSMENT — PATIENT HEALTH QUESTIONNAIRE - PHQ9: SUM OF ALL RESPONSES TO PHQ QUESTIONS 1-9: 2

## 2022-02-08 ASSESSMENT — ANXIETY QUESTIONNAIRES
7. FEELING AFRAID AS IF SOMETHING AWFUL MIGHT HAPPEN: NOT AT ALL
GAD7 TOTAL SCORE: 4
2. NOT BEING ABLE TO STOP OR CONTROL WORRYING: SEVERAL DAYS
3. WORRYING TOO MUCH ABOUT DIFFERENT THINGS: SEVERAL DAYS
5. BEING SO RESTLESS THAT IT IS HARD TO SIT STILL: NOT AT ALL
6. BECOMING EASILY ANNOYED OR IRRITABLE: NOT AT ALL
1. FEELING NERVOUS, ANXIOUS, OR ON EDGE: SEVERAL DAYS
4. TROUBLE RELAXING: SEVERAL DAYS
IF YOU CHECKED OFF ANY PROBLEMS ON THIS QUESTIONNAIRE, HOW DIFFICULT HAVE THESE PROBLEMS MADE IT FOR YOU TO DO YOUR WORK, TAKE CARE OF THINGS AT HOME, OR GET ALONG WITH OTHER PEOPLE: NOT DIFFICULT AT ALL

## 2022-02-08 NOTE — NURSING NOTE
"Chief Complaint   Patient presents with     Lipids     Hypertension     Anxiety       Initial /88   Pulse 72   Temp 98.7  F (37.1  C)   Resp 18   Wt 123.4 kg (272 lb 1.6 oz)   SpO2 97%   BMI 54.04 kg/m   Estimated body mass index is 54.04 kg/m  as calculated from the following:    Height as of 8/25/21: 1.511 m (4' 11.5\").    Weight as of this encounter: 123.4 kg (272 lb 1.6 oz).  Medication Reconciliation: donald Bailey  "

## 2022-02-08 NOTE — PROGRESS NOTES
Assessment & Plan      Right shoulder positive for a rotator cuff tendonitis from over use injury. No XRAY in [place] today. She will follow up if symptoms are not improving.     Anxiety is improved.     Follow up 3 months for a fasting physical.     (M75.81) Rotator cuff tendonitis, right  (primary encounter diagnosis)  Comment: treat with prednisone and flexeril. Supportive care discussed   Plan: predniSONE (DELTASONE) 20 MG tablet,         cyclobenzaprine (FLEXERIL) 10 MG tablet            (F41.1) LISA (generalized anxiety disorder)  Comment: improved. RF  Plan: DULoxetine (CYMBALTA) 30 MG capsule            (I10) Essential hypertension  Comment: stable   Plan: continue med regimen       See Patient Instructions    Return in about 3 months (around 5/8/2022) for Fasting physical .    Nayla Lund NP  [facility]    Christine Zaldivar is a 41 year old who presents for the following health issues     HPI     Hypertension Follow-up      Do you check your blood pressure regularly outside of the clinic? Yes     Are you following a low salt diet? No    Are your blood pressures ever more than 140 on the top number (systolic) OR more   than 90 on the bottom number (diastolic), for example 140/90? n/a    Anxiety Follow-Up    How are you doing with your anxiety since your last visit? Improved     Are you having other symptoms that might be associated with anxiety? No    Have you had a significant life event? No     Are you feeling depressed? No    Do you have any concerns with your use of alcohol or other drugs? No    Social History     Tobacco Use     Smoking status: Current Every Day Smoker     Packs/day: 0.50     Types: Cigarettes     Start date: 1999     Smokeless tobacco: Never Used     Tobacco comment: has patches going to start soon    Vaping Use     Vaping Use: Never used   Substance Use Topics     Alcohol use: Yes     Comment: once a month     Drug use: No     LISA-7 SCORE 8/5/2021  8/25/2021 2/8/2022   Total Score 16 16 4     PHQ 7/29/2021 8/5/2021 2/8/2022   PHQ-9 Total Score 17 17 2   Q9: Thoughts of better off dead/self-harm past 2 weeks Not at all Not at all Not at all     Last PHQ-9 2/8/2022   1.  Little interest or pleasure in doing things 0   2.  Feeling down, depressed, or hopeless 0   3.  Trouble falling or staying asleep, or sleeping too much 1   4.  Feeling tired or having little energy 1   5.  Poor appetite or overeating 0   6.  Feeling bad about yourself 0   7.  Trouble concentrating 0   8.  Moving slowly or restless 0   Q9: Thoughts of better off dead/self-harm past 2 weeks 0   PHQ-9 Total Score 2   Difficulty at work, home, or with people Not difficult at all     LISA-7  2/8/2022   1. Feeling nervous, anxious, or on edge 1   2. Not being able to stop or control worrying 1   3. Worrying too much about different things 1   4. Trouble relaxing 1   5. Being so restless that it is hard to sit still 0   6. Becoming easily annoyed or irritable 0   7. Feeling afraid, as if something awful might happen 0   LISA-7 Total Score 4   If you checked any problems, how difficult have they made it for you to do your work, take care of things at home, or get along with other people? Not difficult at all         How many servings of fruits and vegetables do you eat daily?  2-3    On average, how many sweetened beverages do you drink each day (Examples: soda, juice, sweet tea, etc.  Do NOT count diet or artificially sweetened beverages)?   0    How many days per week do you exercise enough to make your heart beat faster? 3 or less    How many minutes a day do you exercise enough to make your heart beat faster? 9 or less    How many days per week do you miss taking your medication? 0    Right shoulder pain that has been increasing. Denies injury or trauma. She is right hand dominant.     Review of Systems   Constitutional, HEENT, cardiovascular, pulmonary, gi and gu systems are negative, except as  otherwise noted.      Objective    /88   Pulse 72   Temp 98.7  F (37.1  C)   Resp 18   Wt 123.4 kg (272 lb 1.6 oz)   SpO2 97%   BMI 54.04 kg/m    Body mass index is 54.04 kg/m .  Physical Exam   GENERAL: healthy, alert and no distress  RESP: lungs clear to auscultation - no rales, rhonchi or wheezes  CV: regular rate and rhythm, normal S1 S2, no S3 or S4, no murmur, click or rub, no peripheral edema and peripheral pulses strong  MS: no gross musculoskeletal defects noted, no edema. +CMS and limited ROM to right upper extremity. TTP to right anterior and posterior rotator cuff region. Distal pulses intact. Increase in symptoms with abduction and flexion to right upper extremity. No rash, erythema, bruising, or warmth to right shoulder/arm.   SKIN: no suspicious lesions or rashes  NEURO: Normal strength and tone, mentation intact and speech normal  PSYCH: mentation appears normal, affect normal/bright

## 2022-02-08 NOTE — PATIENT INSTRUCTIONS
Patient Education     Tendonitis  A tendon is the thick fibrous cord that joins muscle to bone and allows joints to move. When a tendon becomes inflamed, it is called tendonitis. This can occur from overuse, injury, or infection. This usually involves the shoulders, forearm, wrist, hands and feet. Symptoms include pain, swelling and tenderness to the touch. Moving the joint increases the pain.  It takes 4 to 6 weeks or more for tendonitis to heal. It is treated by preventing motion of the tendon, occasionally with a splint or brace, and the use of anti-inflammatory medicine.  Home care    Some people find relief with ice packs. These can be crushed or cubed ice in a plastic bag or a bag of frozen vegetables wrapped in a thin towel. Other people get better relief with heat. This can include a hot shower, hot bath, or a moist towel warmed in a microwave. Try each and use the method that feels best, for 15 to 20 minutes several times a day.    Rest the inflamed joint and protect it from movement.    You may use over-the-counter ibuprofen or naproxen to treat pain and inflammation, unless another medicine was prescribed. If you can't take these medicines, acetaminophen may help with the pain, but does not treat inflammation. If you have chronic liver or kidney disease or ever had a stomach ulcer or gastrointestinal bleeding, talk with your doctor before using these medicines.    As your symptoms improve, begin gradual motion at the involved joint.  Follow-up care  Follow up with your healthcare provider if you are not improving after 5 to 7 days of treatment.  When to seek medical advice  Call your healthcare provider right away if any of these occur:    Redness over the painful area    Increasing pain or swelling at the joint    Fever lasting 24 to 48 hours or chills, or as advised by your healthcare provider  [employer] last reviewed this educational content on 5/1/2018 2000-2021 [employer]. All rights  reserved. This information is not intended as a substitute for professional medical care. Always follow your healthcare professional's instructions.

## 2022-02-09 ASSESSMENT — ANXIETY QUESTIONNAIRES: GAD7 TOTAL SCORE: 4

## 2022-02-15 ENCOUNTER — ANCILLARY PROCEDURE (OUTPATIENT)
Dept: GENERAL RADIOLOGY | Facility: OTHER | Age: 42
End: 2022-02-15
Attending: NURSE PRACTITIONER
Payer: COMMERCIAL

## 2022-02-15 ENCOUNTER — TELEPHONE (OUTPATIENT)
Dept: FAMILY MEDICINE | Facility: OTHER | Age: 42
End: 2022-02-15
Payer: COMMERCIAL

## 2022-02-15 ENCOUNTER — OFFICE VISIT (OUTPATIENT)
Dept: FAMILY MEDICINE | Facility: OTHER | Age: 42
End: 2022-02-15
Attending: NURSE PRACTITIONER
Payer: COMMERCIAL

## 2022-02-15 VITALS
SYSTOLIC BLOOD PRESSURE: 130 MMHG | TEMPERATURE: 98.6 F | BODY MASS INDEX: 54.02 KG/M2 | HEART RATE: 77 BPM | DIASTOLIC BLOOD PRESSURE: 82 MMHG | WEIGHT: 272 LBS | OXYGEN SATURATION: 97 %

## 2022-02-15 DIAGNOSIS — M79.621 AXILLARY PAIN, RIGHT: ICD-10-CM

## 2022-02-15 DIAGNOSIS — M54.6 ACUTE RIGHT-SIDED THORACIC BACK PAIN: ICD-10-CM

## 2022-02-15 DIAGNOSIS — R07.89 CHEST WALL PAIN: ICD-10-CM

## 2022-02-15 DIAGNOSIS — R92.8 ABNORMAL MAMMOGRAM: Primary | ICD-10-CM

## 2022-02-15 DIAGNOSIS — R10.13 ABDOMINAL PAIN, EPIGASTRIC: ICD-10-CM

## 2022-02-15 DIAGNOSIS — M25.511 ACUTE PAIN OF RIGHT SHOULDER: ICD-10-CM

## 2022-02-15 DIAGNOSIS — F41.1 GAD (GENERALIZED ANXIETY DISORDER): ICD-10-CM

## 2022-02-15 LAB
BASOPHILS # BLD AUTO: 0 10E3/UL (ref 0–0.2)
BASOPHILS NFR BLD AUTO: 0 %
EOSINOPHIL # BLD AUTO: 0.4 10E3/UL (ref 0–0.7)
EOSINOPHIL NFR BLD AUTO: 4 %
ERYTHROCYTE [DISTWIDTH] IN BLOOD BY AUTOMATED COUNT: 15.4 % (ref 10–15)
HCT VFR BLD AUTO: 44 % (ref 35–47)
HGB BLD-MCNC: 14 G/DL (ref 11.7–15.7)
LYMPHOCYTES # BLD AUTO: 1.3 10E3/UL (ref 0.8–5.3)
LYMPHOCYTES NFR BLD AUTO: 14 %
MCH RBC QN AUTO: 28.6 PG (ref 26.5–33)
MCHC RBC AUTO-ENTMCNC: 31.8 G/DL (ref 31.5–36.5)
MCV RBC AUTO: 90 FL (ref 78–100)
MONOCYTES # BLD AUTO: 0.8 10E3/UL (ref 0–1.3)
MONOCYTES NFR BLD AUTO: 9 %
NEUTROPHILS # BLD AUTO: 6.9 10E3/UL (ref 1.6–8.3)
NEUTROPHILS NFR BLD AUTO: 73 %
PLATELET # BLD AUTO: 236 10E3/UL (ref 150–450)
RBC # BLD AUTO: 4.9 10E6/UL (ref 3.8–5.2)
WBC # BLD AUTO: 9.5 10E3/UL (ref 4–11)

## 2022-02-15 PROCEDURE — 36415 COLL VENOUS BLD VENIPUNCTURE: CPT | Performed by: NURSE PRACTITIONER

## 2022-02-15 PROCEDURE — 72070 X-RAY EXAM THORAC SPINE 2VWS: CPT | Mod: TC | Performed by: RADIOLOGY

## 2022-02-15 PROCEDURE — 99214 OFFICE O/P EST MOD 30 MIN: CPT | Performed by: NURSE PRACTITIONER

## 2022-02-15 PROCEDURE — 71046 X-RAY EXAM CHEST 2 VIEWS: CPT | Mod: TC | Performed by: RADIOLOGY

## 2022-02-15 PROCEDURE — 80053 COMPREHEN METABOLIC PANEL: CPT | Performed by: NURSE PRACTITIONER

## 2022-02-15 PROCEDURE — 85025 COMPLETE CBC W/AUTO DIFF WBC: CPT | Performed by: NURSE PRACTITIONER

## 2022-02-15 RX ORDER — TRAMADOL HYDROCHLORIDE 50 MG/1
50 TABLET ORAL
Qty: 10 TABLET | Refills: 0 | Status: SHIPPED | OUTPATIENT
Start: 2022-02-15 | End: 2022-10-03

## 2022-02-15 RX ORDER — HYDROXYZINE HYDROCHLORIDE 25 MG/1
TABLET, FILM COATED ORAL
Qty: 60 TABLET | Refills: 0 | Status: SHIPPED | OUTPATIENT
Start: 2022-02-15 | End: 2022-10-11

## 2022-02-15 ASSESSMENT — PAIN SCALES - GENERAL: PAINLEVEL: EXTREME PAIN (8)

## 2022-02-15 NOTE — PATIENT INSTRUCTIONS
Patient Education     Noncardiac Chest Pain    Based on your visit today, the healthcare provider doesn t know what is causing your chest pain. In most cases, people who come to the emergency room with chest pain don t have a problem with their heart. Instead, the pain is caused by other conditions. It's important for the healthcare team to be sure you are not having a life-threatening cause for chest pain such as:     Heart attack    Blood clot in the lungs    Collapsed lung    Ruptured esophagus    Tearing of the aorta  Once these major causes have been ruled out, you may have further evaluation for nonheart causes of chest pain. These may be problems with the lungs, muscles, bones, digestive tract, nerves, or mental health. They include:     Inflammation around the lungs (pleurisy)    Collapsed lung (pneumothorax)    Fluid around the lungs (pleural effusion)    Lung cancer (a rare cause of chest pain)    Inflamed cartilage between the ribs (costochondritis)    Fibromyalgia    Rheumatoid arthritis    Chest wall strain    Reflux    Stomach ulcer    Spasms of the esophagus    Gall stones    Gallbladder inflammation    Panic or anxiety attacks    Emotional distress  Your condition doesn t seem serious. And your pain doesn t seem to be coming from your heart. But sometimes the signs of a serious problem take more time to appear. Watch for the warning signs listed below.   Home care  Follow these guidelines when caring for yourself at home:     Rest today and don't do any strenuous activity.    Take any prescribed medicine as directed.  Follow-up care  Follow up with your healthcare provider, or as advised, if you don t start to feel better in 24 hours.   Call 911  Call 911 if any of these occur:     A change in the type of pain: if it feels different, becomes more severe, lasts longer, or begins to spread into your shoulder, arm, neck, jaw or back    Shortness of breath or increased pain with breathing    Weakness,  dizziness, or fainting    Rapid heart beat    Crushing sensation in your chest  When to seek medical advice  Call your healthcare provider right away if any of these occur:     Cough with dark colored sputum (phlegm) or blood    Fever of 100.4 F (38 C) or higher, or as directed by your healthcare provider    Swelling, pain or redness in one leg  [employer] last reviewed this educational content on 6/1/2019 2000-2021 [employer]. All rights reserved. This information is not intended as a substitute for professional medical care. Always follow your healthcare professional's instructions.    Patient Education     Possible Causes of Low Back or Leg Pain    BIG: The symptoms in your back or leg may be due to pressure on a nerve. This pressure may be caused by a damaged disk or by abnormal bone growth. Either way, you may feel pain, burning, tingling, or numbness. If you have pressure on a nerve that connects to the sciatic nerve, pain may shoot down your leg.    Pressure from the disk  Constant wear and tear can weaken a disk over time and cause back pain. The disk can then be damaged by a sudden movement or injury. If its soft center starts to bulge, the disk may press on a nerve. Or the outside of the disk may tear, and the soft center may squeeze through and pinch a nerve.    Pressure from bone  As a disk wears out, the vertebrae right above and below the disk start to touch. This can put pressure on a nerve. Often, abnormal bone (called bone spurs) grows where the vertebrae rub against each other. This can cause the foramen or the spinal canal to narrow (called stenosis) and press against a nerve.  [employer] last reviewed this educational content on 3/1/2018    2102-7282 [employer]. All rights reserved. This information is not intended as a substitute for professional medical care. Always follow your healthcare professional's instructions.

## 2022-02-15 NOTE — TELEPHONE ENCOUNTER
8:49 AM    Reason for Call: OVERBOOK    Patient is having the following symptoms: Arm pain / swelling arm, armpit, into back  for  1 1/2 week    The patient is requesting an appointment for This week with  Nayla Warner    Was an appointment offered for this call?    No    Preferred method for responding to this message: [phone]    If we cannot reach you directly, may we leave a detailed response at the number you provided?   Yes      Trini Serrano

## 2022-02-15 NOTE — NURSING NOTE
"Chief Complaint   Patient presents with     Musculoskeletal Problem       Initial /82   Pulse 77   Temp 98.6  F (37  C)   Wt 123.4 kg (272 lb)   SpO2 97%   BMI 54.02 kg/m   Estimated body mass index is 54.02 kg/m  as calculated from the following:    Height as of 8/25/21: 1.511 m (4' 11.5\").    Weight as of this encounter: 123.4 kg (272 lb).  Medication Reconciliation: complete  Krystyna Mondragon LPN  "

## 2022-02-15 NOTE — TELEPHONE ENCOUNTER
Next 5 appointments (look out 90 days)      Feb 15, 2022  3:45 PM  (Arrive by 3:30 PM)  SHORT with Nayla Lund NP  [facility] [address]  [facility] [address]  [phone]

## 2022-02-15 NOTE — PROGRESS NOTES
Assessment & Plan     XRAY's benign today. CBC normal. After office visit LFT's came back elevated. I initially was going to do a chest CT with nonspecific chest wall pain, but I feel she is probably having referred pain into her right arm and right lateral chest wall from gallbladder disease. She does have epigastric tenderness on exam. I will check an abdominal ultrasound for gallbladder disease. I also will check a right axillary ultrasound with her continuing to feel fullness. I gave her a small feel of Ultram for pain. She had no relief with recent Prednisone and flexeril treatment last week which makes me think this may not be muscular skelatal that is causing her symptoms.     (R92.8) Abnormal mammogram  (primary encounter diagnosis)  Comment: overdue for repeat mammogram  Plan: [facility] Left w/Julien    (F41.1) LISA (generalized anxiety disorder)  Comment: RF  Plan: hydrOXYzine (ATARAX) 25 MG tablet            (R07.89) Chest wall pain  Comment: check labs and chest XRAY. Short fill of Ultram given   Plan: XR CHEST 2 VW (Clinic Performed), Comprehensive        metabolic panel, CBC with platelets and         differential, traMADol (ULTRAM) 50 MG tablet,             (M54.6) Acute right-sided thoracic back pain  Comment: check XRAY and labs   Plan: XR Thoracic Spine 2 Views (Clinic Performed),         Comprehensive metabolic panel, CBC with         platelets and differential, traMADol (ULTRAM)         50 MG tablet    (M79.621) Axillary pain, right  Comment: continued right axillary fullness. No lymph node enlargement palpated on exam. Check ultrasound   Plan: US Axillary Right    (M25.511) Acute pain of right shoulder  Comment: referred pain may be from gallbladder disease. Check gallbladder ultrasound   Plan: US Abdomen Limited            (R10.13) Abdominal pain, epigastric  Comment: check ultrasound for gallbladder disease   Plan: US Abdomen Limited             BMI:   Estimated body mass index is 54.02  "kg/m  as calculated from the following:    Height as of 8/25/21: 1.511 m (4' 11.5\").    Weight as of this encounter: 123.4 kg (272 lb).   Weight management plan: Discussed healthy diet and exercise guidelines    See Patient Instructions    Return if symptoms worsen or fail to improve.    Nayla Lund NP  [facility]    Christine Zaldivar is a 41 year old who presents for the following health issues     HPI     Musculoskeletal problem/pain      Duration: 2 weeks     Description  Location: right shoulder, arm, axilla, upper back     Intensity:  moderate    Accompanying signs and symptoms: burning     History  Previous similar problem: YES  Previous evaluation:  none    Precipitating or alleviating factors:  Trauma or overuse: no. Denies injury or trauma to right shoulder or chest wall  Aggravating factors include: lifting and overuse    Therapies tried and outcome: Ibuprofen and prednisone and ice    No symptoms with eating         Review of Systems   Constitutional, HEENT, cardiovascular, pulmonary, GI, , musculoskeletal, neuro, skin, endocrine and psych systems are negative, except as otherwise noted.      Objective    /82   Pulse 77   Temp 98.6  F (37  C)   Wt 123.4 kg (272 lb)   SpO2 97%   BMI 54.02 kg/m    Body mass index is 54.02 kg/m .  Physical Exam   GENERAL: healthy, alert and no distress  NECK: no adenopathy, no asymmetry, masses, or scars and thyroid normal to palpation  RESP: lungs clear to auscultation - no rales, rhonchi or wheezes  CV: regular rate and rhythm, normal S1 S2, no S3 or S4, no murmur, click or rub, no peripheral edema and peripheral pulses strong  ABDOMEN: soft, no hepatosplenomegaly, no masses and bowel sounds normal. +epigastric tenderness   MS: no gross musculoskeletal defects noted, no edema. TTP to right anterior rotator cuff region and right scapula region. No rash, erythema, bruising or warmth to the touch to back. No step offs or TTP to " spinal column. Distal pulses intact   LYMPH: No lymphadenopathy palpated to bilateral axillary region  SKIN: no suspicious lesions or rashes  PSYCH: mentation appears normal, affect normal/bright    Results for orders placed or performed in visit on 02/15/22   XR Thoracic Spine 2 Views (Clinic Performed)     Status: None    Narrative    XR THORACIC SPINE 2 VIEWS    HISTORY: Acute right-sided thoracic back pain .    COMPARISON: Lateral chest radiograph 2/15/2022.    TECHNIQUE: 2 views of the thoracic spine.    FINDINGS:    The sagittal kyphosis is maintained. No acute fracture is identified.  Vertebral body heights are maintained. There is slight rightward mid  thoracic coronal curvature.      Impression    IMPRESSION:     No acute fracture.      LUTHER FAM MD         SYSTEM ID:  [id number]   Results for orders placed or performed in visit on 02/15/22   XR CHEST 2 VW (Clinic Performed)     Status: None    Narrative    PROCEDURE:  XR CHEST 2 VW    HISTORY: Chest wall pain, .    COMPARISON:  3/10/2011    FINDINGS:  The cardiomediastinal contours are stable. The trachea is midline.  There is calcific aortic atherosclerosis.  No focal consolidation, effusion or pneumothorax.    No suspicious osseous lesion or subdiaphragmatic free air.      Impression    IMPRESSION:      No acute cardiopulmonary process.      LUTHER FAM MD         SYSTEM ID:  [id number]   Results for orders placed or performed in visit on 02/15/22   Comprehensive metabolic panel     Status: Abnormal   Result Value Ref Range    Sodium 134 133 - 144 mmol/L    Potassium 4.8 3.4 - 5.3 mmol/L    Chloride 105 94 - 109 mmol/L    Carbon Dioxide (CO2) 27 20 - 32 mmol/L    Anion Gap 2 (L) 3 - 14 mmol/L    Urea Nitrogen 22 7 - 30 mg/dL    Creatinine 1.02 0.52 - 1.04 mg/dL    Calcium 9.0 8.5 - 10.1 mg/dL    Glucose 100 (H) 70 - 99 mg/dL    Alkaline Phosphatase 118 40 - 150 U/L    AST 79 (H) 0 - 45 U/L     (H) 0 - 50 U/L    Protein Total 7.5  6.8 - 8.8 g/dL    Albumin 3.4 3.4 - 5.0 g/dL    Bilirubin Total 0.5 0.2 - 1.3 mg/dL    GFR Estimate 71 >60 mL/min/1.73m2   CBC with platelets and differential     Status: Abnormal   Result Value Ref Range    WBC Count 9.5 4.0 - 11.0 10e3/uL    RBC Count 4.90 3.80 - 5.20 10e6/uL    Hemoglobin 14.0 11.7 - 15.7 g/dL    Hematocrit 44.0 35.0 - 47.0 %    MCV 90 78 - 100 fL    MCH 28.6 26.5 - 33.0 pg    MCHC 31.8 31.5 - 36.5 g/dL    RDW 15.4 (H) 10.0 - 15.0 %    Platelet Count 236 150 - 450 10e3/uL    % Neutrophils 73 %    % Lymphocytes 14 %    % Monocytes 9 %    % Eosinophils 4 %    % Basophils 0 %    Absolute Neutrophils 6.9 1.6 - 8.3 10e3/uL    Absolute Lymphocytes 1.3 0.8 - 5.3 10e3/uL    Absolute Monocytes 0.8 0.0 - 1.3 10e3/uL    Absolute Eosinophils 0.4 0.0 - 0.7 10e3/uL    Absolute Basophils 0.0 0.0 - 0.2 10e3/uL   CBC with platelets and differential     Status: Abnormal    Narrative    The following orders were created for panel order CBC with platelets and differential.  Procedure                               Abnormality         Status                     ---------                               -----------         ------                     CBC with platelets and d...[[id number]]  Abnormal            Final result                 Please view results for these tests on the individual orders.

## 2022-02-16 LAB
ALBUMIN SERPL-MCNC: 3.4 G/DL (ref 3.4–5)
ALP SERPL-CCNC: 118 U/L (ref 40–150)
ALT SERPL W P-5'-P-CCNC: 112 U/L (ref 0–50)
ANION GAP SERPL CALCULATED.3IONS-SCNC: 2 MMOL/L (ref 3–14)
AST SERPL W P-5'-P-CCNC: 79 U/L (ref 0–45)
BILIRUB SERPL-MCNC: 0.5 MG/DL (ref 0.2–1.3)
BUN SERPL-MCNC: 22 MG/DL (ref 7–30)
CALCIUM SERPL-MCNC: 9 MG/DL (ref 8.5–10.1)
CHLORIDE BLD-SCNC: 105 MMOL/L (ref 94–109)
CO2 SERPL-SCNC: 27 MMOL/L (ref 20–32)
CREAT SERPL-MCNC: 1.02 MG/DL (ref 0.52–1.04)
GFR SERPL CREATININE-BSD FRML MDRD: 71 ML/MIN/1.73M2
GLUCOSE BLD-MCNC: 100 MG/DL (ref 70–99)
POTASSIUM BLD-SCNC: 4.8 MMOL/L (ref 3.4–5.3)
PROT SERPL-MCNC: 7.5 G/DL (ref 6.8–8.8)
SODIUM SERPL-SCNC: 134 MMOL/L (ref 133–144)

## 2022-02-17 ENCOUNTER — ANCILLARY PROCEDURE (OUTPATIENT)
Dept: MAMMOGRAPHY | Facility: OTHER | Age: 42
End: 2022-02-17
Attending: NURSE PRACTITIONER
Payer: COMMERCIAL

## 2022-02-17 DIAGNOSIS — R92.8 ABNORMAL MAMMOGRAM: ICD-10-CM

## 2022-02-17 PROCEDURE — 77065 DX MAMMO INCL CAD UNI: CPT | Mod: TC | Performed by: RADIOLOGY

## 2022-02-17 PROCEDURE — 77061 BREAST TOMOSYNTHESIS UNI: CPT | Mod: TC | Performed by: RADIOLOGY

## 2022-02-23 ENCOUNTER — HOSPITAL ENCOUNTER (OUTPATIENT)
Dept: ULTRASOUND IMAGING | Facility: HOSPITAL | Age: 42
End: 2022-02-23
Attending: NURSE PRACTITIONER
Payer: COMMERCIAL

## 2022-02-23 DIAGNOSIS — M79.621 AXILLARY PAIN, RIGHT: ICD-10-CM

## 2022-02-23 DIAGNOSIS — M25.511 ACUTE PAIN OF RIGHT SHOULDER: ICD-10-CM

## 2022-02-23 DIAGNOSIS — R10.13 ABDOMINAL PAIN, EPIGASTRIC: ICD-10-CM

## 2022-02-23 PROCEDURE — 76705 ECHO EXAM OF ABDOMEN: CPT

## 2022-02-23 PROCEDURE — 76882 US LMTD JT/FCL EVL NVASC XTR: CPT | Mod: RT

## 2022-02-24 DIAGNOSIS — M54.6 ACUTE RIGHT-SIDED THORACIC BACK PAIN: ICD-10-CM

## 2022-02-24 DIAGNOSIS — R07.89 CHEST WALL PAIN: Primary | ICD-10-CM

## 2022-02-24 DIAGNOSIS — M79.621 AXILLARY PAIN, RIGHT: ICD-10-CM

## 2022-02-24 DIAGNOSIS — Z82.69 FAMILY HISTORY OF SYSTEMIC LUPUS ERYTHEMATOSUS: Primary | ICD-10-CM

## 2022-02-25 ENCOUNTER — LAB (OUTPATIENT)
Dept: LAB | Facility: OTHER | Age: 42
End: 2022-02-25
Payer: COMMERCIAL

## 2022-02-25 DIAGNOSIS — Z82.69 FAMILY HISTORY OF SYSTEMIC LUPUS ERYTHEMATOSUS: ICD-10-CM

## 2022-02-25 PROCEDURE — 86038 ANTINUCLEAR ANTIBODIES: CPT

## 2022-02-25 PROCEDURE — 36415 COLL VENOUS BLD VENIPUNCTURE: CPT

## 2022-02-25 PROCEDURE — 86039 ANTINUCLEAR ANTIBODIES (ANA): CPT

## 2022-02-28 ENCOUNTER — TELEPHONE (OUTPATIENT)
Dept: FAMILY MEDICINE | Facility: OTHER | Age: 42
End: 2022-02-28
Payer: COMMERCIAL

## 2022-02-28 NOTE — TELEPHONE ENCOUNTER
Call from patient inquiring on CT Scan. Patient has not received a call to schedule CT.     Call placed to [facility], received notification the imaging department is waiting on insurance approval.     Patient notified and verbalized understanding.

## 2022-03-01 LAB
ANA PAT SER IF-IMP: ABNORMAL
ANA SER QL IF: POSITIVE
ANA TITR SER IF: ABNORMAL {TITER}

## 2022-03-03 ENCOUNTER — TELEPHONE (OUTPATIENT)
Dept: FAMILY MEDICINE | Facility: OTHER | Age: 42
End: 2022-03-03
Payer: COMMERCIAL

## 2022-03-03 DIAGNOSIS — R76.8 POSITIVE ANA (ANTINUCLEAR ANTIBODY): Primary | ICD-10-CM

## 2022-03-17 NOTE — PROGRESS NOTES
Assessment & Plan     She is out of work until April 1, 2022 on FMLA. Ongoing weakness and fatigue. Rheumatology requested further labs and then she will be scheduled to see rheumatology. Her symptoms are improved with rest. She does a lot of typing for work. She works for an insurance company.     (R76.8) Positive LILIA (antinuclear antibody)  (primary encounter diagnosis)  Comment: additional labs per rheumatology   Plan: Cyclic Citrullinated Peptide Antibody IgG,         Rheumatoid factor, DNA double stranded         antibodies, EDUARD antibody panel       (R53.83) Fatigue, unspecified type  Comment: ongoing. Muscle and body aches. On FMLA   Plan: continue to monitor. She will be seeing rheumatology        See Patient Instructions    Return in about 1 month (around 4/22/2022) for Pain follow up .    Nayla Lund NP  [facility]      Kayley is a 41 year old who presents for the following health issues     HPI     Concern - Forms/Fatigue body aches generalized   Onset: Ongoing worse in Feb 2022  Description: FMLA   Intensity: moderate  Progression of Symptoms:  worsening  Accompanying Signs & Symptoms: Needing labs drawn;   CCP antibody, Rheumatoid factor, Double strand DNA, EDUARD panel and a copy of LILIA  Faxed to - [phone]  Previous history of similar problem: Ongoing for years   Precipitating factors:        Worsened by: typing. Use of arms. Fatigue of arms   Alleviating factors:        Improved by: Rest. Flexeril   Therapies tried and outcome: Flexeril     Her grandpa has Lupus.   Her aunt has sarcoidosis.     Review of Systems   Constitutional, HEENT, cardiovascular, pulmonary, gi and gu systems are negative, except as otherwise noted.      Objective    BP (!) 142/106   Pulse 80   Temp 97.6  F (36.4  C)   Resp 18   Wt 121.9 kg (268 lb 12.8 oz)   SpO2 98%   BMI 53.38 kg/m    Body mass index is 53.38 kg/m .  Physical Exam   GENERAL: healthy, alert and no  distress  RESP: lungs clear to auscultation - no rales, rhonchi or wheezes  CV: regular rate and rhythm, normal S1 S2, no S3 or S4, no murmur, click or rub, no peripheral edema and peripheral pulses strong  MS: no gross musculoskeletal defects noted, no edema  SKIN: no suspicious lesions or rashes  NEURO: Normal strength and tone, mentation intact and speech normal  PSYCH: mentation appears normal, affect normal/bright

## 2022-03-22 ENCOUNTER — OFFICE VISIT (OUTPATIENT)
Dept: FAMILY MEDICINE | Facility: OTHER | Age: 42
End: 2022-03-22
Attending: NURSE PRACTITIONER
Payer: COMMERCIAL

## 2022-03-22 VITALS
SYSTOLIC BLOOD PRESSURE: 142 MMHG | WEIGHT: 268.8 LBS | RESPIRATION RATE: 18 BRPM | TEMPERATURE: 97.6 F | OXYGEN SATURATION: 98 % | HEART RATE: 80 BPM | DIASTOLIC BLOOD PRESSURE: 106 MMHG | BODY MASS INDEX: 53.38 KG/M2

## 2022-03-22 DIAGNOSIS — R53.83 FATIGUE, UNSPECIFIED TYPE: ICD-10-CM

## 2022-03-22 DIAGNOSIS — R76.8 POSITIVE ANA (ANTINUCLEAR ANTIBODY): Primary | ICD-10-CM

## 2022-03-22 PROCEDURE — 99213 OFFICE O/P EST LOW 20 MIN: CPT | Performed by: NURSE PRACTITIONER

## 2022-03-22 PROCEDURE — 86431 RHEUMATOID FACTOR QUANT: CPT | Performed by: NURSE PRACTITIONER

## 2022-03-22 PROCEDURE — 86200 CCP ANTIBODY: CPT | Performed by: NURSE PRACTITIONER

## 2022-03-22 PROCEDURE — 36415 COLL VENOUS BLD VENIPUNCTURE: CPT | Performed by: NURSE PRACTITIONER

## 2022-03-22 PROCEDURE — 86235 NUCLEAR ANTIGEN ANTIBODY: CPT | Performed by: NURSE PRACTITIONER

## 2022-03-22 PROCEDURE — 86225 DNA ANTIBODY NATIVE: CPT | Performed by: NURSE PRACTITIONER

## 2022-03-22 ASSESSMENT — PAIN SCALES - GENERAL: PAINLEVEL: SEVERE PAIN (7)

## 2022-03-22 NOTE — PATIENT INSTRUCTIONS
Patient Education     Weakness with Uncertain Cause  Based on your exam today, the exact cause of your weakness is not clear. But your weakness does not seem to be a sign of a serious illness at this time. Keep an eye on your symptoms and get medical advice as instructed below.   Home care    Rest at home today. Don't over-exert yourself.    Take any medicine as prescribed.    For the next few days, drink extra fluids (unless your healthcare provider wants you to restrict fluids for other reasons). Don't skip meals.    Unless otherwise directed, continue to take any prescription medicines.    Contact your healthcare provider if you have any questions or concerns.    Follow-up care  Follow up with your healthcare provider, or as advised.  When to seek medical advice  Call your healthcare provider right away for any of the following:    Symptoms get worse    Symptoms don't start getting better within 2 days    Fever of 100.4  F (38  C) or higher, or as directed by your healthcare provider  Call 911  Call 911 for any of these:     Chest, arm, neck, jaw, or upper back pain    Trouble breathing    Numbness or weakness of the face, one arm, or one leg    Slurred speech, confusion, or trouble speaking, walking, or seeing    Blood in vomit or stool (black or red color)    Severe headache    Loss of consciousness  [employer] last reviewed this educational content on 7/1/2020 2000-2021 [employer]. All rights reserved. This information is not intended as a substitute for professional medical care. Always follow your healthcare professional's instructions.

## 2022-03-22 NOTE — NURSING NOTE
"Chief Complaint   Patient presents with     Forms       Initial BP (!) 142/106   Pulse 80   Temp 97.6  F (36.4  C)   Resp 18   Wt 121.9 kg (268 lb 12.8 oz)   SpO2 98%   BMI 53.38 kg/m   Estimated body mass index is 53.38 kg/m  as calculated from the following:    Height as of 8/25/21: 1.511 m (4' 11.5\").    Weight as of this encounter: 121.9 kg (268 lb 12.8 oz).  Medication Reconciliation: donald Bailey  "

## 2022-03-24 LAB
CCP AB SER IA-ACNC: 1.4 U/ML
DSDNA AB SER-ACNC: 2.1 IU/ML
ENA SM IGG SER IA-ACNC: 3 U/ML
ENA SM IGG SER IA-ACNC: NEGATIVE
ENA SS-A AB SER IA-ACNC: 0.6 U/ML
ENA SS-A AB SER IA-ACNC: NEGATIVE
ENA SS-B IGG SER IA-ACNC: <0.6 U/ML
ENA SS-B IGG SER IA-ACNC: NEGATIVE
RHEUMATOID FACT SER NEPH-ACNC: <6 IU/ML
U1 SNRNP IGG SER IA-ACNC: 1.8 U/ML
U1 SNRNP IGG SER IA-ACNC: NEGATIVE

## 2022-05-05 DIAGNOSIS — I10 ESSENTIAL HYPERTENSION: ICD-10-CM

## 2022-05-05 RX ORDER — PROPRANOLOL HYDROCHLORIDE 160 MG/1
CAPSULE, EXTENDED RELEASE ORAL
Qty: 90 CAPSULE | Refills: 0 | Status: SHIPPED | OUTPATIENT
Start: 2022-05-05 | End: 2022-11-01

## 2022-05-05 NOTE — TELEPHONE ENCOUNTER
propranolol ER (INDERAL LA) 160 MG 24 hr capsule      Last Written Prescription Date:  2-2-22  Last Fill Quantity: 90,   # refills: 1  Last Office Visit: 3-22-22  Future Office visit:       Routing refill request to provider for review/approval because:   Blood pressure under 140/90 in past 12 months    BP Readings from Last 3 Encounters:   03/22/22 (!) 142/106   02/15/22 130/82   02/08/22 132/88

## 2022-07-21 DIAGNOSIS — F41.1 GAD (GENERALIZED ANXIETY DISORDER): ICD-10-CM

## 2022-07-25 RX ORDER — DULOXETIN HYDROCHLORIDE 30 MG/1
CAPSULE, DELAYED RELEASE ORAL
Qty: 90 CAPSULE | Refills: 0 | Status: SHIPPED | OUTPATIENT
Start: 2022-07-25 | End: 2022-10-11

## 2022-07-25 NOTE — TELEPHONE ENCOUNTER
Cymbalta       Last Written Prescription Date:  2/8/22  Last Fill Quantity: 90,   # refills: 0  Last Office Visit: 3/22/22  Future Office visit:

## 2022-10-03 ENCOUNTER — OFFICE VISIT (OUTPATIENT)
Dept: OBGYN | Facility: OTHER | Age: 42
End: 2022-10-03
Attending: OBSTETRICS & GYNECOLOGY
Payer: COMMERCIAL

## 2022-10-03 VITALS
HEIGHT: 60 IN | SYSTOLIC BLOOD PRESSURE: 136 MMHG | HEART RATE: 60 BPM | DIASTOLIC BLOOD PRESSURE: 96 MMHG | BODY MASS INDEX: 49.48 KG/M2 | WEIGHT: 252 LBS

## 2022-10-03 DIAGNOSIS — E66.01 OBESITY, MORBID, BMI 50 OR HIGHER (H): ICD-10-CM

## 2022-10-03 DIAGNOSIS — N93.9 ABNORMAL UTERINE BLEEDING (AUB): Primary | ICD-10-CM

## 2022-10-03 PROCEDURE — 99214 OFFICE O/P EST MOD 30 MIN: CPT | Performed by: OBSTETRICS & GYNECOLOGY

## 2022-10-03 RX ORDER — ERGOCALCIFEROL 1.25 MG/1
CAPSULE, LIQUID FILLED ORAL
COMMUNITY
Start: 2022-04-05 | End: 2023-10-26

## 2022-10-03 RX ORDER — MEDROXYPROGESTERONE ACETATE 10 MG
10 TABLET ORAL 2 TIMES DAILY
Qty: 60 TABLET | Refills: 0 | Status: ON HOLD | OUTPATIENT
Start: 2022-10-03 | End: 2024-02-21

## 2022-10-03 ASSESSMENT — PAIN SCALES - GENERAL: PAINLEVEL: MODERATE PAIN (5)

## 2022-10-03 NOTE — PROGRESS NOTES
GYN CONSULT NOTE     CHIEF COMPLAINT / REASON FOR VISIT  Patient presents for Gynecology consultation at the request of her primary provider, Dr. Nayla Lund, for abnormal uterine bleeding.    HISTORY OF PRESENT ILLNESS  Kayley Pak is a 42 year old  with No LMP recorded. Patient has had an implant. who presents for Gynecology consultation for abnormal uterine bleeding.  The patient has a Nexplanon placed on 2020 and has irregular menses with Nexplanon.  The patient presents for evaluation of irregular and episodic abnormal uterine bleeding that has been ongoing since placement 2 years ago.  The patient a regular daily light bleeding without menorrhagia, heavy blood flow, or blood clots.  She denies fever, chills, shortness of breath or chest pain, nausea, vomiting, diarrhea, change in bowel habits.  She is sexually active denies dyspareunia or postcoital spotting.  She has been treated in the past with Provera for similar abnormal bleeding with Nexplanon which resulted in resolution of the abnormal bleeding for short time before it returned.  The patient smokes tobacco 1/2 pack/day.     MENSTRUAL HISTORY  Menarche was at age 11.  Menses: irregular menses on Nexplanon.  Menses last: 3-7 days.  Heavy menses: Denies.  Clots: Denies.  Intermenstrual bleeding: Abnormal uterine bleeding as above.  Soiling of clothing: Denies.  Dysmenorrhea: Denies.  She is sexually active and denies issues with intercourse.   Dyspareunia: Denies.  Postcoital spotting: Denies.  Current contraception: Nexplanon.  STD History: Herpes.  Last Pap smear history: Normal.    ALLERGIES     Allergies   Allergen Reactions     Amoxicillin      Sulfa Drugs        MEDICATIONS    Current Outpatient Medications:      DULoxetine (CYMBALTA) 30 MG capsule, TAKE 1 CAPSULE BY MOUTH DAILY, Disp: 90 capsule, Rfl: 0     etonogestrel (NEXPLANON) 68 MG IMPL, Inject 68 mg Subcutaneous Inserted 2020, Disp: , Rfl:      hydrOXYzine  (ATARAX) 25 MG tablet, TAKE 1 TABLET BY MOUTH 3 TIMES DAILY AS NEEDED FOR ANXIETY, Disp: 60 tablet, Rfl: 0     losartan (COZAAR) 50 MG tablet, TAKE 1 TABLET BY MOUTH DAILY, Disp: 90 tablet, Rfl: 3     medroxyPROGESTERone (PROVERA) 10 MG tablet, Take 1 tablet (10 mg) by mouth 2 times daily, Disp: 60 tablet, Rfl: 0     nystatin (MYCOSTATIN) 544102 UNIT/GM external powder, Apply topically 2 times daily, Disp: 60 g, Rfl: 0     omeprazole (PRILOSEC) 10 MG DR capsule, Take 20 mg by mouth daily, Disp: , Rfl:      propranolol ER (INDERAL LA) 160 MG 24 hr capsule, TAKE 1 CAPSULE BY MOUTH DAILY, Disp: 90 capsule, Rfl: 0     nicotine (NICODERM CQ) 14 MG/24HR 24 hr patch, Place 1 patch onto the skin every 24 hours (Patient not taking: Reported on 10/3/2022), Disp: 30 patch, Rfl: 0     nicotine (NICODERM CQ) 21 MG/24HR 24 hr patch, Place 1 patch onto the skin every 24 hours (Patient not taking: Reported on 10/3/2022), Disp: 30 patch, Rfl: 0     nicotine (NICODERM CQ) 7 MG/24HR 24 hr patch, Place 1 patch onto the skin every 24 hours (Patient not taking: Reported on 10/3/2022), Disp: 30 patch, Rfl: 0     vitamin D2 (ERGOCALCIFEROL) 81143 units (1250 mcg) capsule, , Disp: , Rfl:     REVIEW OF SYSTEMS  As per the HPI, otherwise negative.    Past Medical History:   Diagnosis Date     Abnormal uterine bleeding (AUB) 10/3/2022     Anxiety     Panic attack     Atypical nevus 12/22/2017     Dyslipidemia 8/7/2012    Low HDL, high triglycerides     Essential hypertension 12/22/2017     LISA (generalized anxiety disorder) 12/22/2017     Herpes simplex type 2 infection 201212     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     Hypertension      Morbid obesity (H) 11/12/2020     Obesity      Obesity, morbid, BMI 50 or higher (H) 11/12/2020     Panic attack 12/22/2017     Pes anserine bursitis 08/25/2015     Psoriasis      Seasonal allergic rhinitis 8/4/2010     Sleep apnea, unspecified type 10/20/2017     TMJ (dislocation of temporomandibular joint) 09/2009     "Resolved     Tobacco abuse 2021       Past Surgical History:   Procedure Laterality Date     IR RENAL STONE REMOVAL RIGHT  2010     miscarriage      Age 19 D & C.     mole removed  2019    Removed in between toes on right foot betwwen 2nd and 3rd toe     TONSILLECTOMY, ADENOIDECTOMY, COMBINED         OB History    Para Term  AB Living   2 1 0 1 1 1   SAB IAB Ectopic Multiple Live Births   1 0 0 0 1      # Outcome Date GA Lbr Gen/2nd Weight Sex Delivery Anes PTL Lv   2  04 34w0d  2.353 kg (5 lb 3 oz) M Vag-Spont   JAYE   1 SAB      SAB   FD       Social History     Tobacco Use     Smoking status: Current Every Day Smoker     Packs/day: 0.50     Types: Cigarettes     Start date:      Smokeless tobacco: Never Used     Tobacco comment: has patches going to start soon    Substance Use Topics     Alcohol use: Yes     Comment: once a month     History   Sexual Activity     Sexual activity: Yes     Partners: Male     Birth control/ protection: Implant       Family History   Problem Relation Age of Onset     Migraines Mother      Hypertension Mother      Alcoholism Father      Mental Illness Father      Lupus Paternal Aunt      Hypertension Maternal Grandmother      Factor V Leiden deficiency Maternal Grandfather      Diabetes Type 2  Maternal Grandfather      Hypertension Paternal Grandmother      Cancer - colorectal Paternal Grandmother      Cancer Paternal Grandfather         stomach     Lupus Paternal Grandfather      Factor V Leiden deficiency Maternal Uncle      Heart Disease Maternal Uncle         Multiple heart attacks     Factor V Leiden deficiency Maternal Aunt      Breast Cancer Maternal Aunt 45     Factor V Leiden deficiency Maternal Uncle      Breast Cancer Cousin 38     Lung Cancer Cousin         also foot cancer     OBJECTIVE  BP (!) 136/96   Pulse 60   Ht 1.511 m (4' 11.5\")   Wt 114.3 kg (252 lb)   BMI 50.05 kg/m      General:  Well-developed, well-nourished " obese, female in no apparent distress.  Neurological: Alert and oriented x3.  Lungs:  Clear to auscultation bilaterally with good inspiratory effort.  No wheezing rhonchi or rales noted. Breathing nonlabored.  Heart:  Regular rate and rhythm without murmur. No JVD.  No peripheral vascular disease.  Abdomen: Soft, obese, nontender, nondistended, positive bowel sounds.  No organomegaly. No rebound, no guarding.  Pelvic exam: Declined.  Extremities:  No clubbing cyanosis or edema. Nontender bilaterally.  Nexplanon capsule palpated on medial aspect of left arm.    DIAGNOSTICS  2020 Pap ASCUS,. Negative HPV    2020 Pelvic ultrasound: Uterus: 8.1 x 4.8 x 4.9 cm. Right Ovary: 2.8 x 2.4 x 1.1 cm. Left Ovary:  2.1 x 1.2 x 1.3 cm. UTERUS: The uterus was echogenic and the endometrium was poorly  visualized. Visualized portion of the endometrium measures 3 mm in thickness. There is a 2 cm in diameter mass adjacent to the endometrium. I am uncertain if this is an endometrial polyp or a submucous fibroid. ADNEXA: No ovarian masses are seen. Arterial and venous flow was identified in both ovaries. MISC: No free fluid is seen in the cul-de-sac.  IMPRESSION:   2 cm diameter mass abutting the endometrium. I am uncertain if this is an endometrial polyp or uterine fibroid.    ASSESSMENT / PLAN  Kayley Pak is a 42 year old  female who presents in consultation for abnormal uterine bleeding.    1 Abnormal uterine bleeding  2 Morbid obese with BMI 50  3 Tobacco abuse  4 Hypertension  5 Anxiety disorder    - I discussed abnormal uterine bleeding with the patient.  - I suspect her abnormal uterine bleeding secondary to Nexplanon.  Dysfunctional bleeding is very common side effect of Nexplanon.  Previously the patient had abnormal uterine bleeding which resolved with a course of Provera.  - I reviewed differential diagnosis with the patient.  I discussed some of the causes of abnormal uterine bleeding including  systemic causes such as thyroid abnormalities, vaginal and cervical abnormalities such as atrophy, cervical or vaginal dysplasia and cervical polyp, and uterine abnormalities such as endometrial polyp, fibroid, endometrial hyperplasia, carcinoma.  - I discussed increased risk for endometrial pathology women over the age of 35 with abnormal uterine bleeding. I discussed increased risk of endometrial hyperplasia, endometrial atypia, endometrial polyps, submucosal fibroids and endometrial carcinoma.  - The patient declines pelvic exam or endometrial biopsy.  - I discussed options with the patient and feel that it is reasonable to give a trial of progesterone to help resolve her abnormal uterine bleeding symptoms.  - I also discussed option of Nexplanon removal.  - I discussed option of changing to another long acting contraception including Mirena IUD or even Depo-Provera.  - I do not recommend estrogen-containing products such as birth control pills due to tobacco abuse, age of 35, and hypertension.  - I discussed checking labs with CBC and TSH.  - I discussed checking a pelvic ultrasound.  - The patient desires trial of oral progesterone.  - I discussed expected outcome, risk, benefits, alternatives, indication of Provera with the patient.  I gave the patient prescription for Provera 10 mg p.o. twice daily x30 days with instructions for use.  - The patient asked appropriate questions and these were answered for her.  - Bleeding precautions are reviewed with the patient.    - Problem list reviewed and updated.  - Follow up in 3-4 weeks or sooner as needed.    Zaheer Ortega MD  Obstetrics and Gynecology      CC: PRIMARY CARE PROVIDER: Nayla Yang.

## 2022-10-03 NOTE — NURSING NOTE
"Chief Complaint   Patient presents with     Abnormal Uterine Bleeding       Initial BP (!) 136/96   Pulse 60   Ht 1.511 m (4' 11.5\")   Wt 114.3 kg (252 lb)   BMI 50.05 kg/m   Estimated body mass index is 50.05 kg/m  as calculated from the following:    Height as of this encounter: 1.511 m (4' 11.5\").    Weight as of this encounter: 114.3 kg (252 lb).  Medication Reconciliation: complete  Diane Jim LPN    "

## 2022-10-05 ENCOUNTER — TELEPHONE (OUTPATIENT)
Dept: OBGYN | Facility: OTHER | Age: 42
End: 2022-10-05

## 2022-10-11 ENCOUNTER — OFFICE VISIT (OUTPATIENT)
Dept: FAMILY MEDICINE | Facility: OTHER | Age: 42
End: 2022-10-11
Attending: NURSE PRACTITIONER
Payer: COMMERCIAL

## 2022-10-11 VITALS
TEMPERATURE: 98.7 F | DIASTOLIC BLOOD PRESSURE: 88 MMHG | OXYGEN SATURATION: 98 % | HEART RATE: 81 BPM | HEIGHT: 60 IN | WEIGHT: 255.4 LBS | BODY MASS INDEX: 50.14 KG/M2 | SYSTOLIC BLOOD PRESSURE: 132 MMHG

## 2022-10-11 DIAGNOSIS — I10 ESSENTIAL HYPERTENSION: ICD-10-CM

## 2022-10-11 DIAGNOSIS — F41.1 GAD (GENERALIZED ANXIETY DISORDER): ICD-10-CM

## 2022-10-11 DIAGNOSIS — N89.8 VAGINAL DISCHARGE: ICD-10-CM

## 2022-10-11 DIAGNOSIS — N93.9 ABNORMAL UTERINE BLEEDING (AUB): ICD-10-CM

## 2022-10-11 DIAGNOSIS — K21.9 CHRONIC GASTROESOPHAGEAL REFLUX DISEASE: Primary | ICD-10-CM

## 2022-10-11 PROCEDURE — 99214 OFFICE O/P EST MOD 30 MIN: CPT | Performed by: NURSE PRACTITIONER

## 2022-10-11 RX ORDER — DULOXETIN HYDROCHLORIDE 30 MG/1
30 CAPSULE, DELAYED RELEASE ORAL DAILY
Qty: 90 CAPSULE | Refills: 3 | Status: SHIPPED | OUTPATIENT
Start: 2022-10-11 | End: 2023-10-26

## 2022-10-11 RX ORDER — HYDROXYZINE HYDROCHLORIDE 25 MG/1
TABLET, FILM COATED ORAL
Qty: 60 TABLET | Refills: 3 | Status: SHIPPED | OUTPATIENT
Start: 2022-10-11 | End: 2023-10-26

## 2022-10-11 ASSESSMENT — PAIN SCALES - GENERAL: PAINLEVEL: SEVERE PAIN (6)

## 2022-10-11 NOTE — PROGRESS NOTES
"  Assessment & Plan     (K21.9) Chronic gastroesophageal reflux disease  (primary encounter diagnosis)  Comment: RF  Plan: omeprazole (PRILOSEC) 20 MG DR capsule            (F41.1) LISA (generalized anxiety disorder)  Comment: Stable. RF  Plan: DULoxetine (CYMBALTA) 30 MG capsule,         hydrOXYzine (ATARAX) 25 MG tablet    (N89.8) Vaginal discharge  Comment: check wet prep  Plan: Wet prep            (N93.9) Abnormal uterine bleeding (AUB)  Comment: continue to follow with Dr. Ortega   Plan: As above      (I10) Essential hypertension  Comment: stable   Plan: Continue medication regime       Nicotine/Tobacco Cessation:  She reports that she has been smoking cigarettes. She started smoking about 23 years ago. She has been smoking an average of .5 packs per day. She has never used smokeless tobacco.  Nicotine/Tobacco Cessation Plan:   Information offered: Patient not interested at this time      BMI:   Estimated body mass index is 50.72 kg/m  as calculated from the following:    Height as of this encounter: 1.511 m (4' 11.5\").    Weight as of this encounter: 115.8 kg (255 lb 6.4 oz).   Weight management plan: Discussed healthy diet and exercise guidelines    See Patient Instructions    Return in about 3 months (around 1/11/2023), or Fasting physical with labs.    TYREE Neville [facility]   Kayley is a 42 year old, presenting for the following health issues:  Abnormal Bleeding Problem, Lipids, Hypertension, and Gastrophageal Reflux      HPI       Hypertension Follow-up      Do you check your blood pressure regularly outside of the clinic? No     Are you following a low salt diet? No    Are your blood pressures ever more than 140 on the top number (systolic) OR more   than 90 on the bottom number (diastolic), for example 140/90? No      GERD/Heartburn  Onset/Duration: ongoing   Description: acid reflux  Intensity: moderate  Progression of Symptoms: same  Accompanying " "Signs & Symptoms:  Does it feel like food gets stuck or trouble swallowing: No  Nausea: No  Vomiting (bloody?): YES- no blood  Abdominal Pain: No  Black-Tarry stools: No  Bloody stools: No  History:  Previous similar episodes: YES  Previous ulcers: No  Precipitating factors:   Caffeine use: No  Alcohol use: YES- moderate  NSAID/Aspirin use: No  Tobacco use: YES  Worse with no particular food or drink.  Alleviating factors: None  Therapies tried and outcome:             Lifestyle changes: None            Medications: Omeprazole (Prilosec)    Menstrual Concern  Onset/Duration: 2 months ago  Description:   Duration of bleeding episodes: 2 months every day with huge clots  Frequency of periods: (1st day of one to 1st day of next):  every 0 days-has the implant  Describe bleeding/flow:   Clots: YES  Number of pads/day: 10        Cramping: moderate  Accompanying Signs & Symptoms:  Lightheadedness: No  Temperature intolerance: No  Nosebleeds/Easy bruising: No  Vaginal Discharge: YES- clear discharge  Acne: No  Change in body hair: No  History:  No LMP recorded. Patient has had an implant.  Previous normal periods: no   Contraceptive use: IUD   Sexually active: No  Any bleeding after intercourse: No  Abnormal PAP Smears: No  Precipitating or alleviating factors: None;saw a OBGYN last week  Therapies tried and outcome: None        Review of Systems   Constitutional, HEENT, cardiovascular, pulmonary, GI, , musculoskeletal, neuro, skin, endocrine and psych systems are negative, except as otherwise noted.      Objective    /88 (BP Location: Right arm, Patient Position: Sitting, Cuff Size: Adult Large)   Pulse 81   Temp 98.7  F (37.1  C) (Tympanic)   Ht 1.511 m (4' 11.5\")   Wt 115.8 kg (255 lb 6.4 oz)   SpO2 98%   BMI 50.72 kg/m    Body mass index is 50.72 kg/m .  Physical Exam   GENERAL: healthy, alert and no distress  NECK: no adenopathy, no asymmetry, masses, or scars and thyroid normal to palpation  RESP: lungs " clear to auscultation - no rales, rhonchi or wheezes  CV: regular rate and rhythm, normal S1 S2, no S3 or S4, no murmur, click or rub, no peripheral edema and peripheral pulses strong  ABDOMEN: soft, nontender, no hepatosplenomegaly, no masses and bowel sounds normal  MS: no gross musculoskeletal defects noted, no edema  SKIN: no suspicious lesions or rashes  NEURO: Normal strength and tone, mentation intact and speech normal  PSYCH: mentation appears normal, affect normal/bright

## 2022-10-11 NOTE — PROGRESS NOTES
"  {PROVIDER CHARTING PREFERENCE:487997}    Christine Zaldivar is a 42 year old{ACCOMPANIED BY STATEMENT (Optional):676040}, presenting for the following health issues:  No chief complaint on file.      HPI     Hyperlipidemia Follow-Up      Are you regularly taking any medication or supplement to lower your cholesterol?   { :486131}    Are you having muscle aches or other side effects that you think could be caused by your cholesterol lowering medication?  { :734609}    Hypertension Follow-up      Do you check your blood pressure regularly outside of the clinic? { :828720}     Are you following a low salt diet? { :484927}    Are your blood pressures ever more than 140 on the top number (systolic) OR more   than 90 on the bottom number (diastolic), for example 140/90? { :982760}    Anxiety Follow-Up    How are you doing with your anxiety since your last visit? { :010095::\"No change\"}    Are you having other symptoms that might be associated with anxiety? { :969075}    Have you had a significant life event? { :795370}     Are you feeling depressed? { :957923}    Do you have any concerns with your use of alcohol or other drugs? { :977659}    Social History     Tobacco Use     Smoking status: Every Day     Packs/day: 0.50     Types: Cigarettes     Start date: 1999     Smokeless tobacco: Never     Tobacco comments:     has patches going to start soon    Vaping Use     Vaping Use: Never used   Substance Use Topics     Alcohol use: Yes     Comment: once a month     Drug use: No     LISA-7 SCORE 8/5/2021 8/25/2021 2/8/2022   Total Score 16 16 4     PHQ 7/29/2021 8/5/2021 2/8/2022   PHQ-9 Total Score 17 17 2   Q9: Thoughts of better off dead/self-harm past 2 weeks Not at all Not at all Not at all     {Last PHQ9 or GAD7 Responses (Optional):361009}      How many servings of fruits and vegetables do you eat daily?  { :184771}    On average, how many sweetened beverages do you drink each day (Examples: soda, juice, sweet tea, " etc.  Do NOT count diet or artificially sweetened beverages)?   { 1-11:000920}    How many days per week do you exercise enough to make your heart beat faster? { :603253}    How many minutes a day do you exercise enough to make your heart beat faster? { :575450}    How many days per week do you miss taking your medication? {0-7 :294206}    {additonal problems for provider to add (Optional):355175}    Review of Systems   {ROS COMP (Optional):328907}      Objective    There were no vitals taken for this visit.  There is no height or weight on file to calculate BMI.  Physical Exam   {Exam List (Optional):954547}    {Diagnostic Test Results (Optional):062836}    {AMBULATORY ATTESTATION (Optional):095154}

## 2022-10-16 ENCOUNTER — HEALTH MAINTENANCE LETTER (OUTPATIENT)
Age: 42
End: 2022-10-16

## 2022-10-31 DIAGNOSIS — I10 ESSENTIAL HYPERTENSION: ICD-10-CM

## 2022-11-01 RX ORDER — PROPRANOLOL HYDROCHLORIDE 160 MG/1
CAPSULE, EXTENDED RELEASE ORAL
Qty: 90 CAPSULE | Refills: 3 | Status: SHIPPED | OUTPATIENT
Start: 2022-11-01 | End: 2023-10-26

## 2023-02-01 DIAGNOSIS — Z12.31 ENCOUNTER FOR SCREENING MAMMOGRAM FOR BREAST CANCER: Primary | ICD-10-CM

## 2023-10-26 ENCOUNTER — OFFICE VISIT (OUTPATIENT)
Dept: FAMILY MEDICINE | Facility: OTHER | Age: 43
End: 2023-10-26
Attending: NURSE PRACTITIONER
Payer: COMMERCIAL

## 2023-10-26 VITALS
BODY MASS INDEX: 51.24 KG/M2 | TEMPERATURE: 98.5 F | SYSTOLIC BLOOD PRESSURE: 152 MMHG | OXYGEN SATURATION: 100 % | HEART RATE: 80 BPM | DIASTOLIC BLOOD PRESSURE: 86 MMHG | WEIGHT: 258 LBS

## 2023-10-26 DIAGNOSIS — K21.9 CHRONIC GASTROESOPHAGEAL REFLUX DISEASE: ICD-10-CM

## 2023-10-26 DIAGNOSIS — Z30.46 NEXPLANON REMOVAL: Primary | ICD-10-CM

## 2023-10-26 DIAGNOSIS — F41.1 GAD (GENERALIZED ANXIETY DISORDER): ICD-10-CM

## 2023-10-26 DIAGNOSIS — D64.9 LOW HEMOGLOBIN: Primary | ICD-10-CM

## 2023-10-26 DIAGNOSIS — Z87.891 PERSONAL HISTORY OF TOBACCO USE, PRESENTING HAZARDS TO HEALTH: ICD-10-CM

## 2023-10-26 DIAGNOSIS — D64.9 LOW HEMOGLOBIN: ICD-10-CM

## 2023-10-26 DIAGNOSIS — Z12.31 ENCOUNTER FOR SCREENING MAMMOGRAM FOR BREAST CANCER: ICD-10-CM

## 2023-10-26 DIAGNOSIS — I10 ESSENTIAL HYPERTENSION: ICD-10-CM

## 2023-10-26 LAB
ALBUMIN SERPL BCG-MCNC: 4.1 G/DL (ref 3.5–5.2)
ALP SERPL-CCNC: 151 U/L (ref 35–104)
ALT SERPL W P-5'-P-CCNC: 26 U/L (ref 0–50)
ANION GAP SERPL CALCULATED.3IONS-SCNC: 13 MMOL/L (ref 7–15)
AST SERPL W P-5'-P-CCNC: 29 U/L (ref 0–45)
BASOPHILS # BLD AUTO: 0.1 10E3/UL (ref 0–0.2)
BASOPHILS NFR BLD AUTO: 1 %
BILIRUB SERPL-MCNC: 0.7 MG/DL
BUN SERPL-MCNC: 14.1 MG/DL (ref 6–20)
CALCIUM SERPL-MCNC: 9.7 MG/DL (ref 8.6–10)
CHLORIDE SERPL-SCNC: 99 MMOL/L (ref 98–107)
CREAT SERPL-MCNC: 0.78 MG/DL (ref 0.51–0.95)
DEPRECATED HCO3 PLAS-SCNC: 24 MMOL/L (ref 22–29)
EGFRCR SERPLBLD CKD-EPI 2021: >90 ML/MIN/1.73M2
EOSINOPHIL # BLD AUTO: 0.6 10E3/UL (ref 0–0.7)
EOSINOPHIL NFR BLD AUTO: 6 %
ERYTHROCYTE [DISTWIDTH] IN BLOOD BY AUTOMATED COUNT: 19.8 % (ref 10–15)
ERYTHROCYTE [DISTWIDTH] IN BLOOD BY AUTOMATED COUNT: 19.8 % (ref 10–15)
FERRITIN SERPL-MCNC: 4 NG/ML (ref 6–175)
GLUCOSE SERPL-MCNC: 101 MG/DL (ref 70–99)
HCT VFR BLD AUTO: 26.3 % (ref 35–47)
HCT VFR BLD AUTO: 26.5 % (ref 35–47)
HGB BLD-MCNC: 7.1 G/DL (ref 11.7–15.7)
HGB BLD-MCNC: 7.1 G/DL (ref 11.7–15.7)
IMM GRANULOCYTES # BLD: 0.1 10E3/UL
IMM GRANULOCYTES NFR BLD: 1 %
IRON BINDING CAPACITY (ROCHE): 501 UG/DL (ref 240–430)
IRON SATN MFR SERPL: 5 % (ref 15–46)
IRON SERPL-MCNC: 24 UG/DL (ref 37–145)
LYMPHOCYTES # BLD AUTO: 1.2 10E3/UL (ref 0.8–5.3)
LYMPHOCYTES NFR BLD AUTO: 13 %
MCH RBC QN AUTO: 18.5 PG (ref 26.5–33)
MCH RBC QN AUTO: 18.6 PG (ref 26.5–33)
MCHC RBC AUTO-ENTMCNC: 26.8 G/DL (ref 31.5–36.5)
MCHC RBC AUTO-ENTMCNC: 27 G/DL (ref 31.5–36.5)
MCV RBC AUTO: 69 FL (ref 78–100)
MCV RBC AUTO: 69 FL (ref 78–100)
MONOCYTES # BLD AUTO: 0.8 10E3/UL (ref 0–1.3)
MONOCYTES NFR BLD AUTO: 9 %
NEUTROPHILS # BLD AUTO: 6.7 10E3/UL (ref 1.6–8.3)
NEUTROPHILS NFR BLD AUTO: 71 %
PLATELET # BLD AUTO: 337 10E3/UL (ref 150–450)
PLATELET # BLD AUTO: 349 10E3/UL (ref 150–450)
POTASSIUM SERPL-SCNC: 4.4 MMOL/L (ref 3.4–5.3)
PROT SERPL-MCNC: 7.7 G/DL (ref 6.4–8.3)
RBC # BLD AUTO: 3.81 10E6/UL (ref 3.8–5.2)
RBC # BLD AUTO: 3.84 10E6/UL (ref 3.8–5.2)
SODIUM SERPL-SCNC: 136 MMOL/L (ref 135–145)
TSH SERPL DL<=0.005 MIU/L-ACNC: 2.12 UIU/ML (ref 0.3–4.2)
WBC # BLD AUTO: 9.4 10E3/UL (ref 4–11)
WBC # BLD AUTO: 9.4 10E3/UL (ref 4–11)

## 2023-10-26 PROCEDURE — G0463 HOSPITAL OUTPT CLINIC VISIT: HCPCS

## 2023-10-26 PROCEDURE — 85045 AUTOMATED RETICULOCYTE COUNT: CPT | Mod: ZL | Performed by: NURSE PRACTITIONER

## 2023-10-26 PROCEDURE — 82728 ASSAY OF FERRITIN: CPT | Mod: ZL | Performed by: NURSE PRACTITIONER

## 2023-10-26 PROCEDURE — 85027 COMPLETE CBC AUTOMATED: CPT | Mod: ZL,XU | Performed by: NURSE PRACTITIONER

## 2023-10-26 PROCEDURE — 99213 OFFICE O/P EST LOW 20 MIN: CPT | Performed by: NURSE PRACTITIONER

## 2023-10-26 PROCEDURE — 80053 COMPREHEN METABOLIC PANEL: CPT | Mod: ZL | Performed by: NURSE PRACTITIONER

## 2023-10-26 PROCEDURE — 85060 BLOOD SMEAR INTERPRETATION: CPT | Performed by: PATHOLOGY

## 2023-10-26 PROCEDURE — 83550 IRON BINDING TEST: CPT | Mod: ZL | Performed by: NURSE PRACTITIONER

## 2023-10-26 PROCEDURE — 85025 COMPLETE CBC W/AUTO DIFF WBC: CPT | Mod: ZL | Performed by: NURSE PRACTITIONER

## 2023-10-26 PROCEDURE — 36415 COLL VENOUS BLD VENIPUNCTURE: CPT | Mod: ZL | Performed by: NURSE PRACTITIONER

## 2023-10-26 PROCEDURE — 84443 ASSAY THYROID STIM HORMONE: CPT | Mod: ZL | Performed by: NURSE PRACTITIONER

## 2023-10-26 RX ORDER — HYDROXYZINE HYDROCHLORIDE 25 MG/1
TABLET, FILM COATED ORAL
Qty: 60 TABLET | Refills: 3 | Status: SHIPPED | OUTPATIENT
Start: 2023-10-26

## 2023-10-26 RX ORDER — SWAB
1 SWAB, NON-MEDICATED MISCELLANEOUS DAILY
Qty: 90 TABLET | Refills: 3 | Status: SHIPPED | OUTPATIENT
Start: 2023-10-26 | End: 2024-03-19

## 2023-10-26 RX ORDER — PROPRANOLOL HYDROCHLORIDE 160 MG/1
160 CAPSULE, EXTENDED RELEASE ORAL DAILY
Qty: 90 CAPSULE | Refills: 3 | Status: SHIPPED | OUTPATIENT
Start: 2023-10-26

## 2023-10-26 RX ORDER — DULOXETIN HYDROCHLORIDE 30 MG/1
30 CAPSULE, DELAYED RELEASE ORAL DAILY
Qty: 90 CAPSULE | Refills: 3 | Status: SHIPPED | OUTPATIENT
Start: 2023-10-26

## 2023-10-26 RX ORDER — LOSARTAN POTASSIUM 50 MG/1
50 TABLET ORAL DAILY
Qty: 90 TABLET | Refills: 3 | Status: SHIPPED | OUTPATIENT
Start: 2023-10-26 | End: 2024-01-04

## 2023-10-26 ASSESSMENT — ANXIETY QUESTIONNAIRES
6. BECOMING EASILY ANNOYED OR IRRITABLE: NOT AT ALL
2. NOT BEING ABLE TO STOP OR CONTROL WORRYING: NOT AT ALL
IF YOU CHECKED OFF ANY PROBLEMS ON THIS QUESTIONNAIRE, HOW DIFFICULT HAVE THESE PROBLEMS MADE IT FOR YOU TO DO YOUR WORK, TAKE CARE OF THINGS AT HOME, OR GET ALONG WITH OTHER PEOPLE: NOT DIFFICULT AT ALL
GAD7 TOTAL SCORE: 0
3. WORRYING TOO MUCH ABOUT DIFFERENT THINGS: NOT AT ALL
5. BEING SO RESTLESS THAT IT IS HARD TO SIT STILL: NOT AT ALL
1. FEELING NERVOUS, ANXIOUS, OR ON EDGE: NOT AT ALL
4. TROUBLE RELAXING: NOT AT ALL
GAD7 TOTAL SCORE: 0
7. FEELING AFRAID AS IF SOMETHING AWFUL MIGHT HAPPEN: NOT AT ALL

## 2023-10-26 ASSESSMENT — PAIN SCALES - GENERAL: PAINLEVEL: NO PAIN (0)

## 2023-10-26 NOTE — COMMUNITY RESOURCES LIST (ENGLISH)
10/26/2023   [facility]  N/A  For questions about this resource list or additional care needs, please contact your primary care clinic or care manager.  Phone: [phone]   Email: N/A   Address: [address] [place] [address]   Hours: N/A        Food and Nutrition       Food pantry  1  [facility] and Service [place] Distance: 3.82 miles      Pickup   [address] [place] [address]  Language: English  Hours: Mon 9:00 AM - 11:00 AM , Tue 1:00 PM - 3:00 PM , Wed - Thu 9:00 AM - 11:00 AM  Fees: Free, Self Pay   Phone: (235) [phone] Email: [email or web address] Website: https://[facility].org/[facility]/[place]     2  [facility] [employer] [facility] Free [facility] Distance: 21.85 miles      Pickup   [address] [place] [address]  Language: English  Hours: Mon - Sun Open 24 Hours  Fees: Free   Phone: (118) 390-5447 Email: cory@[employer].org Website: http://www.[employer].org     SNAP application assistance  3  [facility] & Human Services  Economic Services & [facility] Distance: 4.08 miles      In-Person, Phone/Virtual   [address] [place] [address]  Language: English  Hours: Mon - Fri 8:00 AM - 4:30 PM  Fees: Free   Phone: (704) 346-7144 Website: https://www.[facility].gov/tarnjrrcvxr-p-o/public-health-human-services/economic-services-supports     4  [facility] & Human Services - Economic Services & [facility] Distance: 21.97 miles      In-Person, Phone/Virtual   [address] [place] [address]  Language: English  Hours: Mon - Fri 8:00 AM - 4:30 PM  Fees: Free   Phone: (833) 676-5532 Email: financialassistance@[facility].gov Website: https://[place].gov/lwpwuebayad-m-a/public-health-human-services/economic-services-supports     Soup kitchen or free meals  5  [facility]  [facility] and [facility] Distance: 3.82 miles      Pickup   [address] [place] [address]  Language: English  Hours: Mon - Fri 4:00 PM - 4:45 PM  Fees: Free   Phone: (366) [phone] Email: portia@[facility]\""[employer] Website: https://centralusa.[facility].org/northern/[place]          Important Numbers & Websites       Emergency Services   911  [facility] Services   311  Poison Control   (691) 499-7223  Suicide Prevention Lifeline   (170) 711-9997 (TALK)  Child Abuse Hotline   (762) 261-6254 (4-A-Child)  Sexual Assault Hotline   (577) 184-8419 (HOPE)  National Runaway Safeline   (588) 832-9623 (RUNAWAY)  All-Options Talkline   (348) 338-8092  Substance Abuse Referral   (147) 401-6019 (HELP)

## 2023-10-26 NOTE — PROGRESS NOTES
Assessment & Plan     After Kayley left the clinic, her hemoglobin came back low at 7. I added iron studies and a peripheral smear onto lab draw. I called Kayley to discuss and was unable to reach her. I left a message that I will call back. I added a prenatal vitamin on daily.     (F41.1) LISA (generalized anxiety disorder)  Comment: Stable. RF   Plan: DULoxetine (CYMBALTA) 30 MG capsule,         hydrOXYzine (ATARAX) 25 MG tablet            (K21.9) Chronic gastroesophageal reflux disease  Comment: Stable. RF  Plan: omeprazole (PRILOSEC) 20 MG DR capsule          (Z30.46) Nexplanon removal  (primary encounter diagnosis)  Comment: due for Nexplanon removal. She is due for PAP with HPV. If she doesn't have it done at GYN she will come back to clinic to have done   Plan: Ob/Gyn  Referral      (Z12.31) Encounter for screening mammogram for breast cancer  Comment: Due for mammogram   Plan: MA Screen Bilateral w/Julien            (I10) Essential hypertension  Comment: Little elevation. Anxiety coming to clinic. Denies SOB, chest pain, or dizziness   Plan: losartan (COZAAR) 50 MG tablet, propranolol ER         (INDERAL LA) 160 MG 24 hr capsule, CBC with         platelets, Comprehensive metabolic panel (BMP +        Alb, Alk Phos, ALT, AST, Total. Bili, TP), TSH         with free T4 reflex              (Z87.891) Personal history of tobacco use, presenting hazards to health  Comment: discussed tobacco cessation and declined   Plan: Continue to encourage tobacco cessation        Nicotine/Tobacco Cessation:  She reports that she has been smoking cigarettes. She started smoking about 24 years ago. She has been smoking an average of .5 packs per day. She has never used smokeless tobacco.  Nicotine/Tobacco Cessation Plan:   Information offered: Patient not interested at this time      See Patient Instructions    Return in about 3 months (around 1/26/2024) for Physical .    TYREE Neville CNP  [facility]    Christine Zaldivar is a 43 year old, presenting for the following health issues:  Hypertension      HPI     Hypertension Follow-up    Do you check your blood pressure regularly outside of the clinic? No   Are you following a low salt diet? Yes  Are your blood pressures ever more than 140 on the top number (systolic) OR more   than 90 on the bottom number (diastolic), for example 140/90? NA          8/25/2021     8:00 AM 2/8/2022     1:00 PM 10/26/2023    10:01 AM   LISA-7 SCORE   Total Score   0 (minimal anxiety)   Total Score 16 4 0           7/29/2021     2:36 PM 8/5/2021     1:12 PM 2/8/2022     1:00 PM   PHQ   PHQ-9 Total Score 17 17 2   Q9: Thoughts of better off dead/self-harm past 2 weeks Not at all Not at all Not at all     PHQ-2 Score:         10/26/2023     9:26 AM 10/11/2022    12:00 PM   PHQ-2 ( 1999 Pfizer)   Q1: Little interest or pleasure in doing things 0 0   Q2: Feeling down, depressed or hopeless 0 0   PHQ-2 Score 0 0   Q1: Little interest or pleasure in doing things Not at all    Q2: Feeling down, depressed or hopeless Not at all    PHQ-2 Score 0          Review of Systems   Constitutional, HEENT, cardiovascular, pulmonary, gi and gu systems are negative, except as otherwise noted.      Objective    BP (!) 152/86   Pulse 80   Temp 98.5  F (36.9  C) (Tympanic)   Wt 117 kg (258 lb)   SpO2 100%   BMI 51.24 kg/m    Body mass index is 51.24 kg/m .  Physical Exam   GENERAL: healthy, alert and no distress  NECK: no adenopathy, no asymmetry, masses, or scars and thyroid normal to palpation  RESP: lungs clear to auscultation - no rales, rhonchi or wheezes  CV: regular rate and rhythm, normal S1 S2, no S3 or S4, no murmur, click or rub, no peripheral edema and peripheral pulses strong  MS: no gross musculoskeletal defects noted, no edema  SKIN: no suspicious lesions or rashes  PSYCH: mentation appears normal, affect normal/bright            Answers submitted by the patient for  this visit:  LISA-7 (Submitted on 10/26/2023)  LISA 7 TOTAL SCORE: 0

## 2023-10-26 NOTE — COMMUNITY RESOURCES LIST (ENGLISH)
10/26/2023   [facility]  N/A  For additional resource needs, please contact your health insurance member services or your primary care team.  Phone: [phone]   Email: N/A   Address: [address] [place] [address]   Hours: N/A        Food and Nutrition       Food pantry  1  [facility] and Service [place] Distance: 3.82 miles      Pickup   [address] [place] [address]  Language: English  Hours: Mon 9:00 AM - 11:00 AM , Tue 1:00 PM - 3:00 PM , Wed - Thu 9:00 AM - 11:00 AM  Fees: Free, Self Pay   Phone: (884) 379-3632 Email: [email or web address]@[email or web address] Website: https://[facility].org/[facility]/[place]     2  [facility] [employer] [facility] Free [facility] Distance: 21.85 miles      Pickup   [address] [place] [address]  Language: English  Hours: Mon - Sun Open 24 Hours  Fees: Free   Phone: (856) 584-4524 Email: cory@[employer].org Website: http://www.[employer].org     SNAP application assistance  3  [facility] & Human [facility] Economic Services & Supports [facility] Distance: 4.08 miles      In-Person, Phone/Virtual   [address] [place] [address]  Language: English  Hours: Mon - Fri 8:00 AM - 4:30 PM  Fees: Free   Phone: (984) 447-6862 Website: https://www.[facility].gov/trjckfihhqw-f-r/[facility]-human-services/economic-services-supports     4  [facility] & Human Services - Economic Services & [facility] Distance: 21.97 miles      In-Person, Phone/Virtual   [address] [place] [address]  Language: English  Hours: Mon - Fri 8:00 AM - 4:30 PM  Fees: Free   Phone: (934) 367-7187 Email: financialassistance@[facility].gov Website: https://www.[place].gov/gqomnkddlwe-g-d/public-health-human-services/economic-services-supports     Soup kitchen or free meals  5  [facility] [place]  [facility] and Service Center Distance: 3.82 miles      Pickup   [address] [place] [address]  Language: English  Hours: Mon - Fri 4:00 PM - 4:45 PM  Fees: Free   Phone: (557) 976-8904 Email: portia@[facility].[employer] Website: https://centralusa.[facility].org/northern/[place]          Important Numbers & Websites       [place] [address] 211itedway.org  Poison Control   (989) 418-3125 [place]po[place].org  Suicide and Crisis Lifeline   [address] [place], [place] [address]line.org  Childhelp [place] Child Abuse Hotline   429.440.4559 Childhelphotline.org  [place] Sexual Assault Hotline   (275) 239-6566 (HOPE) [facility] Runaway Safeline   (118) 948-8236 (RUNAWAY) [place] [facility] Centerrunaway.org  Pregnancy & Postpartum Support [place]   Call/text 667-201-7883 Ppsupportmn.org  Substance Abuse National Helpline ([facility]   827-592-HELP (1413) Findtreatment.gov  Emergency Services   916

## 2023-10-27 LAB
RETICS # AUTO: 0.14 10E6/UL (ref 0.03–0.1)
RETICS/RBC NFR AUTO: 3.6 % (ref 0.5–2)

## 2023-10-30 ENCOUNTER — LAB (OUTPATIENT)
Dept: LAB | Facility: OTHER | Age: 43
End: 2023-10-30
Payer: COMMERCIAL

## 2023-10-30 DIAGNOSIS — D64.9 LOW HEMOGLOBIN: ICD-10-CM

## 2023-10-30 LAB
BASOPHILS # BLD AUTO: 0 10E3/UL (ref 0–0.2)
BASOPHILS NFR BLD AUTO: 1 %
EOSINOPHIL # BLD AUTO: 0.4 10E3/UL (ref 0–0.7)
EOSINOPHIL NFR BLD AUTO: 5 %
ERYTHROCYTE [DISTWIDTH] IN BLOOD BY AUTOMATED COUNT: 21.6 % (ref 10–15)
HCT VFR BLD AUTO: 27.3 % (ref 35–47)
HGB BLD-MCNC: 7.2 G/DL (ref 11.7–15.7)
IMM GRANULOCYTES # BLD: 0.1 10E3/UL
IMM GRANULOCYTES NFR BLD: 1 %
LYMPHOCYTES # BLD AUTO: 1.2 10E3/UL (ref 0.8–5.3)
LYMPHOCYTES NFR BLD AUTO: 14 %
MCH RBC QN AUTO: 19 PG (ref 26.5–33)
MCHC RBC AUTO-ENTMCNC: 26.4 G/DL (ref 31.5–36.5)
MCV RBC AUTO: 72 FL (ref 78–100)
MONOCYTES # BLD AUTO: 0.7 10E3/UL (ref 0–1.3)
MONOCYTES NFR BLD AUTO: 8 %
NEUTROPHILS # BLD AUTO: 6.2 10E3/UL (ref 1.6–8.3)
NEUTROPHILS NFR BLD AUTO: 71 %
NRBC # BLD AUTO: 0 10E3/UL
NRBC BLD AUTO-RTO: 1 /100
PATH REPORT.COMMENTS IMP SPEC: NORMAL
PATH REPORT.FINAL DX SPEC: NORMAL
PATH REPORT.MICROSCOPIC SPEC OTHER STN: NORMAL
PATH REPORT.MICROSCOPIC SPEC OTHER STN: NORMAL
PLATELET # BLD AUTO: 336 10E3/UL (ref 150–450)
RBC # BLD AUTO: 3.78 10E6/UL (ref 3.8–5.2)
WBC # BLD AUTO: 8.7 10E3/UL (ref 4–11)

## 2023-10-30 PROCEDURE — 85004 AUTOMATED DIFF WBC COUNT: CPT | Mod: ZL

## 2023-10-30 PROCEDURE — 36415 COLL VENOUS BLD VENIPUNCTURE: CPT | Mod: ZL

## 2023-11-04 ENCOUNTER — HEALTH MAINTENANCE LETTER (OUTPATIENT)
Age: 43
End: 2023-11-04

## 2023-11-09 ENCOUNTER — OFFICE VISIT (OUTPATIENT)
Dept: OBGYN | Facility: OTHER | Age: 43
End: 2023-11-09
Attending: OBSTETRICS & GYNECOLOGY
Payer: COMMERCIAL

## 2023-11-09 VITALS
OXYGEN SATURATION: 98 % | WEIGHT: 258 LBS | BODY MASS INDEX: 48.71 KG/M2 | DIASTOLIC BLOOD PRESSURE: 90 MMHG | HEIGHT: 61 IN | SYSTOLIC BLOOD PRESSURE: 153 MMHG | HEART RATE: 81 BPM

## 2023-11-09 DIAGNOSIS — N92.1 MENOMETRORRHAGIA: ICD-10-CM

## 2023-11-09 DIAGNOSIS — E66.813 CLASS 3 SEVERE OBESITY DUE TO EXCESS CALORIES IN ADULT, UNSPECIFIED BMI, UNSPECIFIED WHETHER SERIOUS COMORBIDITY PRESENT (H): ICD-10-CM

## 2023-11-09 DIAGNOSIS — N93.9 ABNORMAL UTERINE BLEEDING (AUB): ICD-10-CM

## 2023-11-09 DIAGNOSIS — D50.0 IRON DEFICIENCY ANEMIA DUE TO CHRONIC BLOOD LOSS: ICD-10-CM

## 2023-11-09 DIAGNOSIS — Z30.46 NEXPLANON REMOVAL: Primary | ICD-10-CM

## 2023-11-09 DIAGNOSIS — E66.01 CLASS 3 SEVERE OBESITY DUE TO EXCESS CALORIES IN ADULT, UNSPECIFIED BMI, UNSPECIFIED WHETHER SERIOUS COMORBIDITY PRESENT (H): ICD-10-CM

## 2023-11-09 PROCEDURE — G0463 HOSPITAL OUTPT CLINIC VISIT: HCPCS

## 2023-11-09 PROCEDURE — G0463 HOSPITAL OUTPT CLINIC VISIT: HCPCS | Mod: 25

## 2023-11-09 PROCEDURE — 99214 OFFICE O/P EST MOD 30 MIN: CPT | Performed by: OBSTETRICS & GYNECOLOGY

## 2023-11-09 RX ORDER — PNV NO.95/FERROUS FUM/FOLIC AC 28MG-0.8MG
1 TABLET ORAL DAILY
COMMUNITY
End: 2024-03-19

## 2023-11-09 ASSESSMENT — PAIN SCALES - GENERAL: PAINLEVEL: MILD PAIN (3)

## 2023-11-09 NOTE — PROGRESS NOTES
CC:  Consult from SANDRA Lund NP for AUB/Nexplanon removal  HPI:  Kayley Pak is a 43 year old female P1 (). No LMP recorded. Patient has had an implant..  Nexplanon due for removal.  + sexually active in monogamous relationship.  Pt has been experiencing irregular heavy bleeding lasting up to 6 weeks with gushing/clots.    She was noted to be severely anemic and started on iron. She has longstanding history of AUB/heavy periods.  Previously tried Mirena IUD which fell out.    Her menstrual flow is limiting her clothing choices and interferes with lifestyle/activites.       Clots: Yes  Intermenstrual bleeding: Yes  Previous work-up:Yes,  had US showing possible endometrial abnormality (fibroid/polyp) 2cm  Contraception: Nexplanon  Abnormal Pap: Yes  Dysmenorrhea: No  Pelvic pain:No  Dyspareunia: No    Past GYN history: Herpes       Last PAP smear:  ASCUS/neg HPV     Patients records are available and reviewed at today's visit.    Past Medical History:   Diagnosis Date    Abnormal uterine bleeding (AUB) 10/3/2022    Anxiety     Panic attack    Atypical nevus 2017    Dyslipidemia 2012    Low HDL, high triglycerides    Essential hypertension 2017    LISA (generalized anxiety disorder) 2017    Herpes simplex type 2 infection 2012    Herpesvirus 2 2012    Hypertension     Morbid obesity (H) 2020    Obesity     Obesity, morbid, BMI 50 or higher (H) 2020    Panic attack 2017    Pes anserine bursitis 2015    Psoriasis     Seasonal allergic rhinitis 2010    Sleep apnea, unspecified type 10/20/2017    TMJ (dislocation of temporomandibular joint) 2009    Resolved    Tobacco abuse 2021       Past Surgical History:   Procedure Laterality Date    IR RENAL STONE REMOVAL RIGHT  2010    miscarriage      Age 19 D & C.    mole removed  2019    Removed in between toes on right foot betwwen 2nd and 3rd toe    TONSILLECTOMY, ADENOIDECTOMY, COMBINED          Family History   Problem Relation Age of Onset    Migraines Mother     Hypertension Mother     Alcoholism Father     Mental Illness Father     Lupus Paternal Aunt     Hypertension Maternal Grandmother     Factor V Leiden deficiency Maternal Grandfather     Diabetes Type 2  Maternal Grandfather     Hypertension Paternal Grandmother     Cancer - colorectal Paternal Grandmother     Cancer Paternal Grandfather         stomach    Lupus Paternal Grandfather     Factor V Leiden deficiency Maternal Uncle     Heart Disease Maternal Uncle         Multiple heart attacks    Factor V Leiden deficiency Maternal Aunt     Breast Cancer Maternal Aunt 45    Factor V Leiden deficiency Maternal Uncle     Breast Cancer Cousin 38    Lung Cancer Cousin         also foot cancer       Current Outpatient Medications   Medication Sig Dispense Refill    DULoxetine (CYMBALTA) 30 MG capsule Take 1 capsule (30 mg) by mouth daily 90 capsule 3    Ferrous Sulfate (IRON) 325 (65 Fe) MG tablet Take 1 tablet by mouth daily      losartan (COZAAR) 50 MG tablet Take 1 tablet (50 mg) by mouth daily 90 tablet 3    omeprazole (PRILOSEC) 20 MG DR capsule Take 1 capsule (20 mg) by mouth daily 90 capsule 3    Prenatal Vit-Fe Fumarate-FA (PRENATAL MULTIVITAMIN  WITH IRON) 28-0.8 MG TABS Take 1 tablet by mouth daily 90 tablet 3    propranolol ER (INDERAL LA) 160 MG 24 hr capsule Take 1 capsule (160 mg) by mouth daily 90 capsule 3    hydrOXYzine (ATARAX) 25 MG tablet TAKE 1 TABLET BY MOUTH 3 TIMES DAILY AS NEEDED FOR ANXIETY (Patient not taking: Reported on 11/9/2023) 60 tablet 3    medroxyPROGESTERone (PROVERA) 10 MG tablet Take 1 tablet (10 mg) by mouth 2 times daily (Patient not taking: Reported on 11/9/2023) 60 tablet 0    nicotine (NICODERM CQ) 7 MG/24HR 24 hr patch Place 1 patch onto the skin every 24 hours (Patient not taking: Reported on 11/9/2023) 30 patch 0       Allergies: Amoxicillin and Sulfa antibiotics    ROS:  CONSTITUTIONAL: NEGATIVE for  "fever, chills, + wt gain on Nexplanon  GI: NEGATIVE for nausea, abdominal pain, heartburn, or change in bowel habits  : NEGATIVE for frequency, dysuria, hematuria, vaginal discharge      EXAM:  Blood pressure (!) 153/90, pulse 81, height 1.549 m (5' 1\"), weight 117 kg (258 lb), SpO2 98%.   BMI= Body mass index is 48.75 kg/m .  General - pleasant female in no acute distress.  Abdomen - soft, nontender, nondistended, no hepatosplenomegaly.  Left arm, upper, Nexplanon palpated  Rectovaginal - deferred.  Musculoskeletal - no gross deformities or edema  Neurological - normal mental status.      ASSESSMENT/PLAN:   AUB, menometrorrhagia, iron deficiency anemia.  Discussed expectant,  medical, IUD and and surgical options(endometrial ablation/hysterectomy).  Possible submucosal fibroid/polyp on previous US.  F/up US ordered.  Recommend further endometrial evaluation with hysteroscopy/D and C, possible ablation, frozen section pathology. .  Desires sterilization. Nexplanon  and due for removal.  We reviewed the risks and benefits of tubal ligation/salpingectomy procedures including risks of bleeding, infection, damage to other organs. Risks of tubal failure, and possibility of ectopic pregnancy were also explained. We discussed alternative forms of birth control.  Federal sterilization forms reviewed and signed.   Specifically, we discussed a laparoscopic bilateral salpingectomy    We reviewed risks of laparoscopy, specifically risk of bleeding, infection, damage to nerves, blood vessels, bowel and bladder. Discussed recovery period and expected discomfort.   I will see her back after US to discuss results and POC.  She will be due for cervical cancer screening and Pap at that time.    If no contraindications on US/Pap then would discuss scheduling planned procedure of hysteroscopy, D and C, frozen section pathology, endometrial ablation, laparoscopic bilateral salpingectomy, Nexplanon removal left arm.  Would also " need anesthesia consult given increased BMI.   35  minutes were spent on the day of the encounter, outside of a procedure,  doing face-to-face counseling, review or records, charting,  and coordination of care

## 2023-11-10 ENCOUNTER — HOSPITAL ENCOUNTER (OUTPATIENT)
Dept: ULTRASOUND IMAGING | Facility: HOSPITAL | Age: 43
Discharge: HOME | End: 2023-11-10
Attending: OBSTETRICS & GYNECOLOGY | Admitting: OBSTETRICS & GYNECOLOGY
Payer: COMMERCIAL

## 2023-11-10 DIAGNOSIS — N93.9 ABNORMAL UTERINE BLEEDING (AUB): ICD-10-CM

## 2023-11-10 PROCEDURE — 76856 US EXAM PELVIC COMPLETE: CPT

## 2023-11-22 ENCOUNTER — PREP FOR PROCEDURE (OUTPATIENT)
Dept: SURGERY | Facility: OTHER | Age: 43
End: 2023-11-22

## 2023-11-22 ENCOUNTER — HOSPITAL ENCOUNTER (OUTPATIENT)
Facility: HOSPITAL | Age: 43
End: 2023-11-22
Attending: SURGERY | Admitting: SURGERY
Payer: COMMERCIAL

## 2023-11-22 ENCOUNTER — OFFICE VISIT (OUTPATIENT)
Dept: SURGERY | Facility: OTHER | Age: 43
End: 2023-11-22
Attending: SURGERY
Payer: COMMERCIAL

## 2023-11-22 VITALS
TEMPERATURE: 98.8 F | DIASTOLIC BLOOD PRESSURE: 86 MMHG | HEART RATE: 88 BPM | SYSTOLIC BLOOD PRESSURE: 148 MMHG | OXYGEN SATURATION: 100 % | RESPIRATION RATE: 16 BRPM

## 2023-11-22 DIAGNOSIS — D64.9 ANEMIA: Primary | ICD-10-CM

## 2023-11-22 DIAGNOSIS — D64.9 LOW HEMOGLOBIN: Primary | ICD-10-CM

## 2023-11-22 PROCEDURE — 99203 OFFICE O/P NEW LOW 30 MIN: CPT | Performed by: SURGERY

## 2023-11-22 PROCEDURE — G0463 HOSPITAL OUTPT CLINIC VISIT: HCPCS

## 2023-11-22 ASSESSMENT — PAIN SCALES - GENERAL: PAINLEVEL: MILD PAIN (2)

## 2023-11-22 NOTE — PROGRESS NOTES
[facility] Surgery Consultation    CC:  anemia     HPI:  This 43 year old year old female is seen at the request of Nayla Lund for evaluation of low blood counts. She notes she has significant menstrual bleeding almost daily. She is being seen by GYN for this and there was plans currently for hysterectomy, possibly before the end of the year. She is taking iron supplements which causes her stools to be darker as well as resulting in constipation. She denies angle blood in stools. Her grandmother did have colon cancer. She did have a grandfather with stomach cancer. She is a smoker.     Past Medical History:   Diagnosis Date    Abnormal uterine bleeding (AUB) 10/3/2022    Anxiety     Panic attack    Atypical nevus 12/22/2017    Dyslipidemia 8/7/2012    Low HDL, high triglycerides    Essential hypertension 12/22/2017    LISA (generalized anxiety disorder) 12/22/2017    Herpes simplex type 2 infection 201212    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    Hypertension     Morbid obesity (H) 11/12/2020    Obesity     Obesity, morbid, BMI 50 or higher (H) 11/12/2020    Panic attack 12/22/2017    Pes anserine bursitis 08/25/2015    Psoriasis     Seasonal allergic rhinitis 8/4/2010    Sleep apnea, unspecified type 10/20/2017    TMJ (dislocation of temporomandibular joint) 09/2009    Resolved    Tobacco abuse 6/21/2021       Past Surgical History:   Procedure Laterality Date    IR RENAL STONE REMOVAL RIGHT  2010    miscarriage  1999    Age 19 D & C.    mole removed  2019    Removed in between toes on right foot betwwen 2nd and 3rd toe    TONSILLECTOMY, ADENOIDECTOMY, COMBINED  1993       Allergies   Allergen Reactions    Amoxicillin     Sulfa Antibiotics        Current Outpatient Medications   Medication    DULoxetine (CYMBALTA) 30 MG capsule    Ferrous Sulfate (IRON) 325 (65 Fe) MG tablet    hydrOXYzine (ATARAX) 25 MG tablet    losartan (COZAAR) 50 MG tablet    medroxyPROGESTERone (PROVERA) 10 MG tablet    nicotine (NICODERM CQ)  7 MG/24HR 24 hr patch    omeprazole (PRILOSEC) 20 MG DR capsule    Prenatal Vit-Fe Fumarate-FA (PRENATAL MULTIVITAMIN  WITH IRON) 28-0.8 MG TABS    propranolol ER (INDERAL LA) 160 MG 24 hr capsule     No current facility-administered medications for this visit.       HABITS:    Social History     Tobacco Use    Smoking status: Every Day     Packs/day: .5     Types: Cigarettes     Start date: 1999    Smokeless tobacco: Never    Tobacco comments:     has patches going to start soon    Vaping Use    Vaping Use: Never used   Substance Use Topics    Alcohol use: Yes     Comment: once a month    Drug use: No       Family History   Problem Relation Age of Onset    Migraines Mother     Hypertension Mother     Alcoholism Father     Mental Illness Father     Lupus Paternal Aunt     Hypertension Maternal Grandmother     Factor V Leiden deficiency Maternal Grandfather     Diabetes Type 2  Maternal Grandfather     Hypertension Paternal Grandmother     Cancer - colorectal Paternal Grandmother     Cancer Paternal Grandfather         stomach    Lupus Paternal Grandfather     Factor V Leiden deficiency Maternal Uncle     Heart Disease Maternal Uncle         Multiple heart attacks    Factor V Leiden deficiency Maternal Aunt     Breast Cancer Maternal Aunt 45    Factor V Leiden deficiency Maternal Uncle     Breast Cancer Cousin 38    Lung Cancer Cousin         also foot cancer       REVIEW OF SYSTEMS:  Ten point review of systems negative except those mentioned in the HPI.     OBJECTIVE:    BP (!) 148/86 (BP Location: Right arm, Patient Position: Sitting, Cuff Size: Adult Large)   Pulse 88   Temp 98.8  F (37.1  C) (Tympanic)   Resp 16   SpO2 100%     GENERAL: Generally appears well, in no distress with appropriate affect.  HEENT:   Sclerae anicteric - normocephalic atraumatic       IMPRESSION:    Discussed the most likely reason for her anemia is the prolonged menstrual bleeding and would her focus her efforts there fr the time  being. Did discuss with her family history of GI malignancies could plan for upper and lower endoscopy next Spring especially if her blood counts are not coming up like expected. She is agreement with this plan. Will need a pre-op prior due to BMI.        PLAN:    Upper and lower endoscopy in Spring.     Trey Rhodes MD,     11/22/2023  10:08 AM

## 2023-11-24 ENCOUNTER — TELEPHONE (OUTPATIENT)
Dept: FAMILY MEDICINE | Facility: OTHER | Age: 43
End: 2023-11-24

## 2023-11-24 NOTE — TELEPHONE ENCOUNTER
Received a PA request from [facility] for omeprazole (PRILOSEC) 20 MG DR capsule. Submitted on CMM. Waiting for a response.

## 2023-11-27 NOTE — TELEPHONE ENCOUNTER
Received a DENIAL from [facility] for  omeprazole (PRILOSEC) 20 MG DR capsule.                 Scanned in Epic.

## 2023-12-01 ENCOUNTER — PREP FOR PROCEDURE (OUTPATIENT)
Dept: OBGYN | Facility: OTHER | Age: 43
End: 2023-12-01

## 2023-12-01 ENCOUNTER — OFFICE VISIT (OUTPATIENT)
Dept: OBGYN | Facility: OTHER | Age: 43
End: 2023-12-01
Attending: OBSTETRICS & GYNECOLOGY
Payer: COMMERCIAL

## 2023-12-01 ENCOUNTER — HOSPITAL ENCOUNTER (OUTPATIENT)
Facility: HOSPITAL | Age: 43
End: 2023-12-01
Attending: OBSTETRICS & GYNECOLOGY | Admitting: OBSTETRICS & GYNECOLOGY
Payer: COMMERCIAL

## 2023-12-01 VITALS
HEART RATE: 87 BPM | SYSTOLIC BLOOD PRESSURE: 143 MMHG | OXYGEN SATURATION: 98 % | DIASTOLIC BLOOD PRESSURE: 90 MMHG | WEIGHT: 258 LBS | HEIGHT: 61 IN | BODY MASS INDEX: 48.71 KG/M2

## 2023-12-01 DIAGNOSIS — Z30.2 REQUEST FOR STERILIZATION: ICD-10-CM

## 2023-12-01 DIAGNOSIS — D64.9 ANEMIA: ICD-10-CM

## 2023-12-01 DIAGNOSIS — Z86.2 HISTORY OF ANEMIA: ICD-10-CM

## 2023-12-01 DIAGNOSIS — N93.9 ABNORMAL UTERINE BLEEDING (AUB): ICD-10-CM

## 2023-12-01 DIAGNOSIS — D64.9 ANEMIA: Primary | ICD-10-CM

## 2023-12-01 DIAGNOSIS — N93.9 ABNORMAL UTERINE BLEEDING (AUB): Primary | ICD-10-CM

## 2023-12-01 DIAGNOSIS — N88.8 CERVICAL MASS: ICD-10-CM

## 2023-12-01 DIAGNOSIS — Z30.2 ENCOUNTER FOR STERILIZATION: ICD-10-CM

## 2023-12-01 PROCEDURE — 87624 HPV HI-RISK TYP POOLED RSLT: CPT | Mod: ZL | Performed by: OBSTETRICS & GYNECOLOGY

## 2023-12-01 PROCEDURE — 88142 CYTOPATH C/V THIN LAYER: CPT | Mod: ZL | Performed by: OBSTETRICS & GYNECOLOGY

## 2023-12-01 PROCEDURE — 99214 OFFICE O/P EST MOD 30 MIN: CPT | Performed by: OBSTETRICS & GYNECOLOGY

## 2023-12-01 PROCEDURE — 88141 CYTOPATH C/V INTERPRET: CPT | Performed by: PATHOLOGY

## 2023-12-01 PROCEDURE — G0463 HOSPITAL OUTPT CLINIC VISIT: HCPCS

## 2023-12-01 ASSESSMENT — PAIN SCALES - GENERAL: PAINLEVEL: NO PAIN (0)

## 2023-12-01 NOTE — PROGRESS NOTES
S:  f/up AUB, anemia, contraception.    See my prior eval.  Pt returns after US to discuss results and POC.  3 cm endocervical mass noted on US with blood supply tracing back to uturus.  She is on iron with light irregular bleeding currently.  LPS 2020.          Patient Active Problem List   Diagnosis    Family history of diabetes mellitus    Seasonal allergic rhinitis    Dyslipidemia    Atypical nevus    Atypical nevus of foot, left    Eczema, unspecified type    Essential hypertension    LISA (generalized anxiety disorder)    Herpesvirus 2    Panic attack    Obesity, morbid, BMI 50 or higher (H)    Tobacco abuse    Abnormal uterine bleeding (AUB)            Past Medical History:   Diagnosis Date    Abnormal uterine bleeding (AUB) 10/3/2022    Anxiety     Panic attack    Atypical nevus 12/22/2017    Dyslipidemia 8/7/2012    Low HDL, high triglycerides    Essential hypertension 12/22/2017    LISA (generalized anxiety disorder) 12/22/2017    Herpes simplex type 2 infection 201212    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    Hypertension     Morbid obesity (H) 11/12/2020    Obesity     Obesity, morbid, BMI 50 or higher (H) 11/12/2020    Panic attack 12/22/2017    Pes anserine bursitis 08/25/2015    Psoriasis     Seasonal allergic rhinitis 8/4/2010    Sleep apnea, unspecified type 10/20/2017    TMJ (dislocation of temporomandibular joint) 09/2009    Resolved    Tobacco abuse 6/21/2021            Past Surgical History:   Procedure Laterality Date    IR RENAL STONE REMOVAL RIGHT  2010    miscarriage  1999    Age 19 D & C.    mole removed  2019    Removed in between toes on right foot betwwen 2nd and 3rd toe    TONSILLECTOMY, ADENOIDECTOMY, COMBINED  1993            Social History     Tobacco Use    Smoking status: Every Day     Packs/day: .5     Types: Cigarettes     Start date: 1999    Smokeless tobacco: Never    Tobacco comments:     has patches going to start soon    Substance Use Topics    Alcohol use: Yes     Comment: once a month             Family History   Problem Relation Age of Onset    Migraines Mother     Hypertension Mother     Alcoholism Father     Mental Illness Father     Lupus Paternal Aunt     Hypertension Maternal Grandmother     Factor V Leiden deficiency Maternal Grandfather     Diabetes Type 2  Maternal Grandfather     Hypertension Paternal Grandmother     Cancer - colorectal Paternal Grandmother     Cancer Paternal Grandfather         stomach    Lupus Paternal Grandfather     Factor V Leiden deficiency Maternal Uncle     Heart Disease Maternal Uncle         Multiple heart attacks    Factor V Leiden deficiency Maternal Aunt     Breast Cancer Maternal Aunt 45    Factor V Leiden deficiency Maternal Uncle     Breast Cancer Cousin 38    Lung Cancer Cousin         also foot cancer               Allergies   Allergen Reactions    Amoxicillin     Sulfa Antibiotics             Current Outpatient Medications   Medication Sig Dispense Refill    DULoxetine (CYMBALTA) 30 MG capsule Take 1 capsule (30 mg) by mouth daily 90 capsule 3    Ferrous Sulfate (IRON) 325 (65 Fe) MG tablet Take 1 tablet by mouth daily      losartan (COZAAR) 50 MG tablet Take 1 tablet (50 mg) by mouth daily 90 tablet 3    omeprazole (PRILOSEC) 20 MG DR capsule Take 1 capsule (20 mg) by mouth daily 90 capsule 3    Prenatal Vit-Fe Fumarate-FA (PRENATAL MULTIVITAMIN  WITH IRON) 28-0.8 MG TABS Take 1 tablet by mouth daily 90 tablet 3    propranolol ER (INDERAL LA) 160 MG 24 hr capsule Take 1 capsule (160 mg) by mouth daily 90 capsule 3    hydrOXYzine (ATARAX) 25 MG tablet TAKE 1 TABLET BY MOUTH 3 TIMES DAILY AS NEEDED FOR ANXIETY (Patient not taking: Reported on 11/9/2023) 60 tablet 3    medroxyPROGESTERone (PROVERA) 10 MG tablet Take 1 tablet (10 mg) by mouth 2 times daily (Patient not taking: Reported on 11/9/2023) 60 tablet 0          Review Of Systems  Constitutional:  Denies fever  GI/ negative except as noted per hpi    O:   BP (!) 143/90 (BP Location: Left arm,  "Cuff Size: Adult Large)   Pulse 87   Ht 1.549 m (5' 1\")   Wt 117 kg (258 lb)   SpO2 98%   BMI 48.75 kg/m    Gen:  NAD, A and O    Abd soft, NT, ND  Lymphadenopathy:  neg  Vulva: No lesions, erythema, BUS wnl  Vagina: Pink, moist mucosa with rugae,no lesions  Cervix: Large 3 CM mas present at cervical OS.    Uterus: Midposition, normal size and count our, mobile, NT  Adnexa: Non-palpable, NT, no masses  Anus without lesions  Ext.  neg  Pap done    A:P)    AUB with anemia.    2. Cervical mass.  Consistent with prolapsed fibroid/polyp and likely contributing too or causing AUB.     2.  Desires sterilization/Nexplanon removal.  Previously signed sterilization forms.    Recommend D and C/hysteroscopy with cervical mass removal and pathologic examination, laparoscopic bilateral salpingectomy and Nexplanon removal Left arm.      Reviewed goals, risks, alternatives for planned procedure;  Including risk of bleeding, transfusion, infection, damage to nerves, blood vessels, bowel and bladder, blood clots, pneumonia, and other rare or unforseen complications.   Discussed recovery period and expected discomfort.. All questions were answered.  Preoperative instructions discussed.  Patient desires to proceed.  Schedule Yosef 10, 2024.    Ordered CBC recheck as would recommend we try to increase her blood counts prior to surgery.  Will need anesthesia consult due to BMI.             "

## 2023-12-04 ENCOUNTER — LAB (OUTPATIENT)
Dept: LAB | Facility: OTHER | Age: 43
End: 2023-12-04
Payer: COMMERCIAL

## 2023-12-04 DIAGNOSIS — Z86.2 HISTORY OF ANEMIA: ICD-10-CM

## 2023-12-04 LAB
ERYTHROCYTE [DISTWIDTH] IN BLOOD BY AUTOMATED COUNT: 23.9 % (ref 10–15)
HCT VFR BLD AUTO: 38.5 % (ref 35–47)
HGB BLD-MCNC: 11.6 G/DL (ref 11.7–15.7)
MCH RBC QN AUTO: 24.4 PG (ref 26.5–33)
MCHC RBC AUTO-ENTMCNC: 30.1 G/DL (ref 31.5–36.5)
MCV RBC AUTO: 81 FL (ref 78–100)
PLATELET # BLD AUTO: 387 10E3/UL (ref 150–450)
RBC # BLD AUTO: 4.76 10E6/UL (ref 3.8–5.2)
WBC # BLD AUTO: 8.3 10E3/UL (ref 4–11)

## 2023-12-04 PROCEDURE — 85014 HEMATOCRIT: CPT | Mod: ZL

## 2023-12-04 PROCEDURE — 36416 COLLJ CAPILLARY BLOOD SPEC: CPT | Mod: ZL

## 2023-12-07 LAB
BKR LAB AP GYN ADEQUACY: NORMAL
BKR LAB AP GYN INTERPRETATION: NORMAL
BKR LAB AP GYN OTHER FINDINGS: NORMAL
BKR LAB AP HPV REFLEX: NORMAL
BKR LAB AP PREVIOUS ABNL DX: NORMAL
BKR LAB AP PREVIOUS ABNORMAL: NORMAL
PATH REPORT.COMMENTS IMP SPEC: NORMAL
PATH REPORT.COMMENTS IMP SPEC: NORMAL
PATH REPORT.RELEVANT HX SPEC: NORMAL

## 2023-12-08 LAB
HUMAN PAPILLOMA VIRUS 16 DNA: NEGATIVE
HUMAN PAPILLOMA VIRUS 18 DNA: NEGATIVE
HUMAN PAPILLOMA VIRUS FINAL DIAGNOSIS: NORMAL
HUMAN PAPILLOMA VIRUS OTHER HR: NEGATIVE

## 2023-12-29 NOTE — PROGRESS NOTES
[place] [address] [address]  [facility] [address]  Phone: [phone]  Primary Provider: Nayla Lund  Pre-op Performing Provider: NAYLA LUND  [id number]}  PREOPERATIVE EVALUATION:  Today's date: 1/4/2024    Kayley is a 43 year old, presenting for the following:  Pre-Op Exam      Surgical Information:  Surgery/Procedure: Hysteroscopy with D/C  Surgery Location: [facility]  Surgeon: Dr. Seven Mendenhall  Surgery Date: 1/10/23  Time of Surgery: tbd  Where patient plans to recover: At home with family  Fax number for surgical facility: Note does not need to be faxed, will be available electronically in Epic.    Assessment & Plan     The proposed surgical procedure is considered INTERMEDIATE risk.    (Z01.818) Preop general physical exam  (primary encounter diagnosis)  Comment: check labs, UA, and EKG   Plan: CBC with Platelets & Differential, Basic         metabolic panel, UA Macroscopic with reflex to         Microscopic and Culture, HCG Qual, Urine         (BCF9591), EKG 12-lead complete w/read -         Clinics            (N93.9) Abnormal uterine bleeding (AUB)  Comment: Check labs, UA, and EKG   Plan: CBC with Platelets & Differential, Basic         metabolic panel, UA Macroscopic with reflex to         Microscopic and Culture, EKG 12-lead complete         w/read - Clinics            (R06.83) Snoring  Comment: sleep study ordered   Plan: Adult Sleep Eval & Management          Referral            (I21.29) Septal infarction (H)  Comment: Will need stress test prior to surgery. She is aware that she will need to hold propranolol 1 day prior. Her last dose of Propranolol was 6:15 this am. Will need result prior to surgery approval    Plan: NM MPI Treadmill           (I10) Essential hypertension  Comment: elevated today, but has anxiety with coming into clinic. Increase Cozaar to 75 mg daily. She will have a BP check next week prior to surgery   Plan: losartan (COZAAR) 50  MG tablet            Possible Sleep Apnea: Snoring at night. Scheduled for sleep study and was unable to do.         - No identified additional risk factors other than previously addressed    Antiplatelet or Anticoagulation Medication Instructions:   - Patient is on no antiplatelet or anticoagulation medications.    Additional Medication Instructions:  Patient is to take all scheduled medications on the day of surgery    RECOMMENDATION:  APPROVAL GIVEN to proceed with proposed procedure pending review of diagnostic evaluation. Will need stress test result prior to approval.       Subjective       HPI related to upcoming procedure: Heavy menstrual bleeding with irregular periods. Pre operative examination.           1/4/2024     8:11 AM   Preop Questions   1. Have you ever had a heart attack or stroke? No   2. Have you ever had surgery on your heart or blood vessels, such as a stent placement, a coronary artery bypass, or surgery on an artery in your head, neck, heart, or legs? No   3. Do you have chest pain with activity? No   4. Do you have a history of  heart failure? No   5. Do you currently have a cold, bronchitis or symptoms of other infection? No   6. Do you have a cough, shortness of breath, or wheezing? No   7. Do you or anyone in your family have previous history of blood clots? YES - Maternal uncle and cousin. Factor 5  She has tested negative for factor 5.    8. Do you or does anyone in your family have a serious bleeding problem such as prolonged bleeding following surgeries or cuts? No   9. Have you ever had problems with anemia or been told to take iron pills? YES - heavy menstrual bleeding    10. Have you had any abnormal blood loss such as black, tarry or bloody stools, or abnormal vaginal bleeding? YES - heavy menstrual bleeding    11. Have you ever had a blood transfusion? No   12. Are you willing to have a blood transfusion if it is medically needed before, during, or after your surgery? Yes   13.  Have you or any of your relatives ever had problems with anesthesia? No   14. Do you have sleep apnea, excessive snoring or daytime drowsiness? YES -snoring    14a. Do you have a CPAP machine? No   15. Do you have any artifical heart valves or other implanted medical devices like a pacemaker, defibrillator, or continuous glucose monitor? No   16. Do you have artificial joints? No   17. Are you allergic to latex? No   18. Is there any chance that you may be pregnant? No     METS 4+     Health Care Directive:  Patient does not have a Health Care Directive or Living Will: Discussed advance care planning with patient; information given to patient to review.    Preoperative Review of :   reviewed - no record of controlled substances prescribed.      Status of Chronic Conditions:  See problem list for active medical problems.  Problems all longstanding and stable, except as noted/documented.  See ROS for pertinent symptoms related to these conditions.    Review of Systems  CONSTITUTIONAL: NEGATIVE for fever, chills, change in weight  INTEGUMENTARY/SKIN: NEGATIVE for worrisome rashes, moles or lesions  EYES: NEGATIVE for vision changes or irritation  ENT/MOUTH: NEGATIVE for ear, mouth and throat problems  RESP: NEGATIVE for significant cough or SOB  CV: NEGATIVE for chest pain, palpitations or peripheral edema  GI: NEGATIVE for nausea, abdominal pain, heartburn, or change in bowel habits  : NEGATIVE for frequency, dysuria, or hematuria  MUSCULOSKELETAL: NEGATIVE for significant arthralgias or myalgia  NEURO: NEGATIVE for weakness, dizziness or paresthesias  ENDOCRINE: NEGATIVE for temperature intolerance, skin/hair changes  HEME: NEGATIVE for bleeding problems  PSYCHIATRIC: NEGATIVE for changes in mood or affect    Patient Active Problem List    Diagnosis Date Noted    Abnormal uterine bleeding (AUB) 10/03/2022     Priority: Medium    Tobacco abuse 06/21/2021     Priority: Medium    Obesity, morbid, BMI 50 or  higher (H) 11/12/2020     Priority: Medium    Atypical nevus of foot, left 03/02/2018     Priority: Medium    Atypical nevus 12/22/2017     Priority: Medium    Eczema, unspecified type 12/22/2017     Priority: Medium    Essential hypertension 12/22/2017     Priority: Medium    LISA (generalized anxiety disorder) 12/22/2017     Priority: Medium    Panic attack 12/22/2017     Priority: Medium    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     Priority: Medium    Dyslipidemia 08/07/2012     Priority: Medium     Low HDL, high triglycerides      Seasonal allergic rhinitis 08/04/2010     Priority: Medium    Family history of diabetes mellitus      Priority: Medium      Past Medical History:   Diagnosis Date    Abnormal uterine bleeding (AUB) 10/3/2022    Anxiety     Panic attack    Atypical nevus 12/22/2017    Dyslipidemia 8/7/2012    Low HDL, high triglycerides    Essential hypertension 12/22/2017    LISA (generalized anxiety disorder) 12/22/2017    Herpes simplex type 2 infection 201212    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    mole removed  2019    Removed in between toes on right foot betwwen 2nd and 3rd toe    TONSILLECTOMY, ADENOIDECTOMY, COMBINED  1993     Current Outpatient Medications   Medication Sig Dispense Refill    DULoxetine (CYMBALTA) 30 MG capsule Take 1 capsule (30 mg) by mouth daily 90 capsule 3    Ferrous Sulfate (IRON) 325 (65 Fe) MG tablet Take 1 tablet by mouth daily      hydrOXYzine (ATARAX) 25 MG tablet TAKE 1 TABLET BY MOUTH 3 TIMES  "DAILY AS NEEDED FOR ANXIETY 60 tablet 3    losartan (COZAAR) 50 MG tablet Take 1.5 tablets (75 mg) by mouth daily for 90 days 135 tablet 0    medroxyPROGESTERone (PROVERA) 10 MG tablet Take 1 tablet (10 mg) by mouth 2 times daily 60 tablet 0    omeprazole (PRILOSEC) 20 MG DR capsule Take 1 capsule (20 mg) by mouth daily 90 capsule 3    Prenatal Vit-Fe Fumarate-FA (PRENATAL MULTIVITAMIN  WITH IRON) 28-0.8 MG TABS Take 1 tablet by mouth daily 90 tablet 3    propranolol ER (INDERAL LA) 160 MG 24 hr capsule Take 1 capsule (160 mg) by mouth daily 90 capsule 3       Allergies   Allergen Reactions    Amoxicillin     Sulfa Antibiotics         Social History     Tobacco Use    Smoking status: Every Day     Packs/day: .5     Types: Cigarettes     Start date: 1999    Smokeless tobacco: Never    Tobacco comments:     has patches going to start soon    Substance Use Topics    Alcohol use: Yes     Comment: once a month     Family History   Problem Relation Age of Onset    Migraines Mother     Hypertension Mother     Alcoholism Father     Mental Illness Father     Lupus Paternal Aunt     Hypertension Maternal Grandmother     Factor V Leiden deficiency Maternal Grandfather     Diabetes Type 2  Maternal Grandfather     Hypertension Paternal Grandmother     Cancer - colorectal Paternal Grandmother     Cancer Paternal Grandfather         stomach    Lupus Paternal Grandfather     Factor V Leiden deficiency Maternal Uncle     Heart Disease Maternal Uncle         Multiple heart attacks    Factor V Leiden deficiency Maternal Aunt     Breast Cancer Maternal Aunt 45    Factor V Leiden deficiency Maternal Uncle     Breast Cancer Cousin 38    Lung Cancer Cousin         also foot cancer     History   Drug Use No         Objective     BP (!) 160/100 (BP Location: Right arm, Patient Position: Sitting, Cuff Size: Adult Large)   Pulse 78   Temp 98.7  F (37.1  C) (Tympanic)   Resp 16   Ht 1.549 m (5' 1\")   Wt 117.7 kg (259 lb 8 oz)   SpO2 97% "   BMI 49.03 kg/m      Kayley did take her BP medications today.     Physical Exam    GENERAL APPEARANCE: healthy, alert and no distress     EYES: EOMI, PERRL     HENT: ear canals and TM's normal and nose and mouth without ulcers or lesions     NECK: no adenopathy, no asymmetry, masses, or scars and thyroid normal to palpation     RESP: lungs clear to auscultation - no rales, rhonchi or wheezes     CV: regular rates and rhythm, normal S1 S2, no S3 or S4 and no murmur, click or rub     ABDOMEN:  soft, nontender, no HSM or masses and bowel sounds normal     MS: extremities normal- no gross deformities noted, no evidence of inflammation in joints, FROM in all extremities.     SKIN: no suspicious lesions or rashes     NEURO: Normal strength and tone, sensory exam grossly normal, mentation intact and speech normal     PSYCH: mentation appears normal. and affect normal/bright     LYMPHATICS: No cervical adenopathy      Diagnostics:  Recent Results (from the past 24 hour(s))   CBC with platelets and differential    Collection Time: 01/04/24  8:14 AM   Result Value Ref Range    WBC Count 6.3 4.0 - 11.0 10e3/uL    RBC Count 4.75 3.80 - 5.20 10e6/uL    Hemoglobin 12.6 11.7 - 15.7 g/dL    Hematocrit 40.9 35.0 - 47.0 %    MCV 86 78 - 100 fL    MCH 26.5 26.5 - 33.0 pg    MCHC 30.8 (L) 31.5 - 36.5 g/dL    RDW 16.6 (H) 10.0 - 15.0 %    Platelet Count 273 150 - 450 10e3/uL    % Neutrophils 71 %    % Lymphocytes 14 %    % Monocytes 9 %    % Eosinophils 6 %    % Basophils 1 %    % Immature Granulocytes 0 %    Absolute Neutrophils 4.5 1.6 - 8.3 10e3/uL    Absolute Lymphocytes 0.9 0.8 - 5.3 10e3/uL    Absolute Monocytes 0.6 0.0 - 1.3 10e3/uL    Absolute Eosinophils 0.4 0.0 - 0.7 10e3/uL    Absolute Basophils 0.0 0.0 - 0.2 10e3/uL    Absolute Immature Granulocytes 0.0 <=0.4 10e3/uL   EKG 12-lead complete w/read - Clinics    Collection Time: 01/04/24  8:23 AM   Result Value Ref Range    Systolic Blood Pressure  mmHg    Diastolic Blood  Pressure  mmHg    Ventricular Rate 75 BPM    Atrial Rate 75 BPM    WA Interval 142 ms    QRS Duration 82 ms     ms    QTc 428 ms    P Axis 17 degrees    R AXIS 30 degrees    T Axis 43 degrees    Interpretation ECG       Sinus rhythm  Low voltage QRS  Septal infarct , age undetermined  Abnormal ECG  When compared with ECG of 23-MAR-2008 15:40,  Septal infarct is now Present  Confirmed by MD Kellogg Anthony ([id number]) on 1/4/2024 8:33:46 AM          Revised Cardiac Risk Index (RCRI):  The patient has the following serious cardiovascular risks for perioperative complications:   - No serious cardiac risks = 0 points     RCRI Interpretation: 0 points: Class I (very low risk - 0.4% complication rate)     BMI 49    Signed Electronically by: TYREE Neville CNP  Copy of this evaluation report is provided to requesting physician.

## 2024-01-03 NOTE — PATIENT INSTRUCTIONS
Preparing for Your Surgery  Getting started  A nurse will call you to review your health history and instructions. They will give you an arrival time based on your scheduled surgery time. Please be ready to share:  Your doctor's clinic name and phone number  Your medical, surgical, and anesthesia history  A list of allergies and sensitivities  A list of medicines, including herbal treatments and over-the-counter drugs  Whether the patient has a legal guardian (ask how to send us the papers in advance)  Please tell us if you're pregnant--or if there's any chance you might be pregnant. Some surgeries may injure a fetus (unborn baby), so they require a pregnancy test. Surgeries that are safe for a fetus don't always need a test, and you can choose whether to have one.   If you have a child who's having surgery, please ask for a copy of Preparing for Your Child's Surgery.    Preparing for surgery  Within 10 to 30 days of surgery: Have a pre-op exam (sometimes called an H&P, or History and Physical). This can be done at a clinic or pre-operative center.  If you're having a , you may not need this exam. Talk to your care team.  At your pre-op exam, talk to your care team about all medicines you take. If you need to stop any medicines before surgery, ask when to start taking them again.  We do this for your safety. Many medicines can make you bleed too much during surgery. Some change how well surgery (anesthesia) drugs work.  Call your insurance company to let them know you're having surgery. (If you don't have insurance, call [phone].)  Call your clinic if there's any change in your health. This includes signs of a cold or flu (sore throat, runny nose, cough, rash, fever). It also includes a scrape or scratch near the surgery site.  If you have questions on the day of surgery, call your hospital or surgery center.  Eating and drinking guidelines  For your safety: Unless your surgeon tells you otherwise,  follow the guidelines below.  Eat and drink as usual until 8 hours before you arrive for surgery. After that, no food or milk.  Drink clear liquids until 2 hours before you arrive. These are liquids you can see through, like water, Gatorade, and Propel Water. They also include plain black coffee and tea (no cream or milk), candy, and breath mints. You can spit out gum when you arrive.  If you drink alcohol: Stop drinking it the night before surgery.  If your care team tells you to take medicine on the morning of surgery, it's okay to take it with a sip of water.  Preventing infection  Shower or bathe the night before and morning of your surgery. Follow the instructions your clinic gave you. (If no instructions, use regular soap.)  Don't shave or clip hair near your surgery site. We'll remove the hair if needed.  Don't smoke or vape the morning of surgery. You may chew nicotine gum up to 2 hours before surgery. A nicotine patch is okay.  Note: Some surgeries require you to completely quit smoking and nicotine. Check with your surgeon.  Your care team will make every effort to keep you safe from infection. We will:  Clean our hands often with soap and water (or an alcohol-based hand rub).  Clean the skin at your surgery site with a special soap that kills germs.  Give you a special gown to keep you warm. (Cold raises the risk of infection.)  Wear special hair covers, masks, gowns and gloves during surgery.  Give antibiotic medicine, if prescribed. Not all surgeries need antibiotics.  What to bring on the day of surgery  Photo ID and insurance card  Copy of your health care directive, if you have one  Glasses and hearing aids (bring cases)  You can't wear contacts during surgery  Inhaler and eye drops, if you use them (tell us about these when you arrive)  CPAP machine or breathing device, if you use them  A few personal items, if spending the night  If you have . . .  A pacemaker, ICD (cardiac defibrillator) or other  implant: Bring the ID card.  An implanted stimulator: Bring the remote control.  A legal guardian: Bring a copy of the certified (court-stamped) guardianship papers.  Please remove any jewelry, including body piercings. Leave jewelry and other valuables at home.  If you're going home the day of surgery  You must have a responsible adult drive you home. They should stay with you overnight as well.  If you don't have someone to stay with you, and you aren't safe to go home alone, we may keep you overnight. Insurance often won't pay for this.  After surgery  If it's hard to control your pain or you need more pain medicine, please call your surgeon's office.  Questions?   If you have any questions for your care team, list them here: _________________________________________________________________________________________________________________________________________________________________________ ____________________________________ ____________________________________ ____________________________________  For informational purposes only. Not to replace the advice of your health care provider. Copyright   2003, 2019 [facility]. All rights reserved. Clinically reviewed by Pauline Mckeon MD. SMARTworks [id number] - REV 12/22.    How to Take Your Medication Before Surgery  - Take all of your medications before surgery as usual

## 2024-01-04 ENCOUNTER — OFFICE VISIT (OUTPATIENT)
Dept: FAMILY MEDICINE | Facility: OTHER | Age: 44
End: 2024-01-04
Attending: NURSE PRACTITIONER
Payer: COMMERCIAL

## 2024-01-04 VITALS
OXYGEN SATURATION: 97 % | RESPIRATION RATE: 16 BRPM | HEART RATE: 78 BPM | WEIGHT: 259.5 LBS | TEMPERATURE: 98.7 F | SYSTOLIC BLOOD PRESSURE: 160 MMHG | DIASTOLIC BLOOD PRESSURE: 100 MMHG | HEIGHT: 61 IN | BODY MASS INDEX: 48.99 KG/M2

## 2024-01-04 DIAGNOSIS — Z01.818 PREOP GENERAL PHYSICAL EXAM: Primary | ICD-10-CM

## 2024-01-04 DIAGNOSIS — I21.29 SEPTAL INFARCTION (H): ICD-10-CM

## 2024-01-04 DIAGNOSIS — N93.9 ABNORMAL UTERINE BLEEDING (AUB): ICD-10-CM

## 2024-01-04 DIAGNOSIS — I10 ESSENTIAL HYPERTENSION: ICD-10-CM

## 2024-01-04 DIAGNOSIS — R06.83 SNORING: ICD-10-CM

## 2024-01-04 LAB
ANION GAP SERPL CALCULATED.3IONS-SCNC: 11 MMOL/L (ref 7–15)
ATRIAL RATE - MUSE: 75 BPM
BASOPHILS # BLD AUTO: 0 10E3/UL (ref 0–0.2)
BASOPHILS NFR BLD AUTO: 1 %
BUN SERPL-MCNC: 15.6 MG/DL (ref 6–20)
CALCIUM SERPL-MCNC: 9.5 MG/DL (ref 8.6–10)
CHLORIDE SERPL-SCNC: 101 MMOL/L (ref 98–107)
CREAT SERPL-MCNC: 0.75 MG/DL (ref 0.51–0.95)
DEPRECATED HCO3 PLAS-SCNC: 24 MMOL/L (ref 22–29)
DIASTOLIC BLOOD PRESSURE - MUSE: NORMAL MMHG
EGFRCR SERPLBLD CKD-EPI 2021: >90 ML/MIN/1.73M2
EOSINOPHIL # BLD AUTO: 0.4 10E3/UL (ref 0–0.7)
EOSINOPHIL NFR BLD AUTO: 6 %
ERYTHROCYTE [DISTWIDTH] IN BLOOD BY AUTOMATED COUNT: 16.6 % (ref 10–15)
GLUCOSE SERPL-MCNC: 109 MG/DL (ref 70–99)
HCT VFR BLD AUTO: 40.9 % (ref 35–47)
HGB BLD-MCNC: 12.6 G/DL (ref 11.7–15.7)
IMM GRANULOCYTES # BLD: 0 10E3/UL
IMM GRANULOCYTES NFR BLD: 0 %
INTERPRETATION ECG - MUSE: NORMAL
LYMPHOCYTES # BLD AUTO: 0.9 10E3/UL (ref 0.8–5.3)
LYMPHOCYTES NFR BLD AUTO: 14 %
MCH RBC QN AUTO: 26.5 PG (ref 26.5–33)
MCHC RBC AUTO-ENTMCNC: 30.8 G/DL (ref 31.5–36.5)
MCV RBC AUTO: 86 FL (ref 78–100)
MONOCYTES # BLD AUTO: 0.6 10E3/UL (ref 0–1.3)
MONOCYTES NFR BLD AUTO: 9 %
NEUTROPHILS # BLD AUTO: 4.5 10E3/UL (ref 1.6–8.3)
NEUTROPHILS NFR BLD AUTO: 71 %
P AXIS - MUSE: 17 DEGREES
PLATELET # BLD AUTO: 273 10E3/UL (ref 150–450)
POTASSIUM SERPL-SCNC: 4.4 MMOL/L (ref 3.4–5.3)
PR INTERVAL - MUSE: 142 MS
QRS DURATION - MUSE: 82 MS
QT - MUSE: 384 MS
QTC - MUSE: 428 MS
R AXIS - MUSE: 30 DEGREES
RBC # BLD AUTO: 4.75 10E6/UL (ref 3.8–5.2)
SODIUM SERPL-SCNC: 136 MMOL/L (ref 135–145)
SYSTOLIC BLOOD PRESSURE - MUSE: NORMAL MMHG
T AXIS - MUSE: 43 DEGREES
VENTRICULAR RATE- MUSE: 75 BPM
WBC # BLD AUTO: 6.3 10E3/UL (ref 4–11)

## 2024-01-04 PROCEDURE — 36415 COLL VENOUS BLD VENIPUNCTURE: CPT | Performed by: NURSE PRACTITIONER

## 2024-01-04 PROCEDURE — 80048 BASIC METABOLIC PNL TOTAL CA: CPT | Performed by: NURSE PRACTITIONER

## 2024-01-04 PROCEDURE — 99214 OFFICE O/P EST MOD 30 MIN: CPT | Performed by: NURSE PRACTITIONER

## 2024-01-04 PROCEDURE — 85025 COMPLETE CBC W/AUTO DIFF WBC: CPT | Performed by: NURSE PRACTITIONER

## 2024-01-04 PROCEDURE — 93000 ELECTROCARDIOGRAM COMPLETE: CPT | Performed by: INTERNAL MEDICINE

## 2024-01-04 RX ORDER — LOSARTAN POTASSIUM 50 MG/1
75 TABLET ORAL DAILY
Qty: 135 TABLET | Refills: 0 | Status: SHIPPED | OUTPATIENT
Start: 2024-01-04 | End: 2024-02-09

## 2024-01-04 ASSESSMENT — PAIN SCALES - GENERAL: PAINLEVEL: MODERATE PAIN (4)

## 2024-01-08 ENCOUNTER — TELEPHONE (OUTPATIENT)
Dept: OBGYN | Facility: OTHER | Age: 44
End: 2024-01-08

## 2024-01-16 ENCOUNTER — PREP FOR PROCEDURE (OUTPATIENT)
Dept: OBGYN | Facility: OTHER | Age: 44
End: 2024-01-16

## 2024-01-16 DIAGNOSIS — Z30.2 ENCOUNTER FOR STERILIZATION: ICD-10-CM

## 2024-01-16 DIAGNOSIS — N93.9 ABNORMAL UTERINE BLEEDING (AUB): Primary | ICD-10-CM

## 2024-01-17 ENCOUNTER — TELEPHONE (OUTPATIENT)
Dept: FAMILY MEDICINE | Facility: OTHER | Age: 44
End: 2024-01-17

## 2024-01-17 ENCOUNTER — TELEPHONE (OUTPATIENT)
Dept: NUCLEAR MEDICINE | Facility: HOSPITAL | Age: 44
End: 2024-01-17

## 2024-01-17 NOTE — TELEPHONE ENCOUNTER
I called Kayley and her question was if the stress had to be in a 30 day window and it doesn't not have to be. I want her to proceed with stress test as scheduled on 1-.

## 2024-01-17 NOTE — TELEPHONE ENCOUNTER
Called to perform an appointment reminder and to discuss exam prep.    Before we could get into this information the patient notified me that her surgery date has been rescheduled to 2-21-24 and she needs to make another pre-op appointment within 30 days.  Patient is also wondering if the stress test should be delayed so this is within the 30 days of surgery.    I will call the pcp and call the patient back once I get an answer for her.

## 2024-01-17 NOTE — TELEPHONE ENCOUNTER
Called patient back to inform her I was not able to reach Nayla Lund and I most likely would not hear back before the end of my shift today.    Patient decided to push stress test appointment  to surgery date.    She has decided to move it to 2-5-24 at 0630.  I will call our scheduling department to have them change the appointments.

## 2024-01-17 NOTE — TELEPHONE ENCOUNTER
Good afternoon,    I called Kayley to perform a reminder call for her Nuclear Medicine Treadmill Stress Test scheduled for 1-19-24.  Before we discussed any information the patient stated she had a question and I was not able to guide her on the correct information.  I have been on hold with trying to call you for over 20 minutes and no one has answered.  It is for the [facility] scheduling desk.  [phone] option 5 is what we were provided for reaching you (by jaime silva's cheat sheet).    The patient stated her surgery date has been rescheduled to 2-21-24 and she needs to redo her pre-op appointment so it is within 30 days of surgery.  She is also wondering if she needs to reschedule her stress test to be closer to the surgery date and within the 30 days.    How would you like to move forward?    I am here today until 4 and do need to return the call so we prep her appropriately or reschedule.    Thank you,  Angie

## 2024-01-18 ENCOUNTER — TELEPHONE (OUTPATIENT)
Dept: NUCLEAR MEDICINE | Facility: HOSPITAL | Age: 44
End: 2024-01-18

## 2024-01-18 ENCOUNTER — ANCILLARY PROCEDURE (OUTPATIENT)
Dept: MAMMOGRAPHY | Facility: OTHER | Age: 44
End: 2024-01-18
Attending: NURSE PRACTITIONER
Payer: COMMERCIAL

## 2024-01-18 DIAGNOSIS — Z12.31 ENCOUNTER FOR SCREENING MAMMOGRAM FOR BREAST CANCER: ICD-10-CM

## 2024-01-18 PROCEDURE — 77063 BREAST TOMOSYNTHESIS BI: CPT | Mod: TC | Performed by: STUDENT IN AN ORGANIZED HEALTH CARE EDUCATION/TRAINING PROGRAM

## 2024-01-18 PROCEDURE — 77067 SCR MAMMO BI INCL CAD: CPT | Mod: TC | Performed by: STUDENT IN AN ORGANIZED HEALTH CARE EDUCATION/TRAINING PROGRAM

## 2024-01-18 NOTE — TELEPHONE ENCOUNTER
Called and spoke with patient stating I heard back from Nayla Lund and her stress test does not need to be moved to within 30 days since her surgery date was rescheduled.   Per our conversation yesterday the patient requested to move her stress test to 2-5-24.   Nayla would like her to keep her original appointment of 1-19-24 so I called Kayley to discuss this.    Kayley asked to do it next week instead of Friday. We have 1-24-24 at 0630 available. I will have the scheduling department move her appointment to this date and time.    We also discussed exam prep and medication prep.    I will inform Nayla of this update.

## 2024-01-22 ENCOUNTER — TELEPHONE (OUTPATIENT)
Dept: NUCLEAR MEDICINE | Facility: HOSPITAL | Age: 44
End: 2024-01-22

## 2024-01-22 NOTE — TELEPHONE ENCOUNTER
Made an appointment reminder call regarding NM Treadmill stress scheduled on 1/24  at 630 , reminded pt to stop propanolol er after Monday, no pill Tuesday or Wednesday.   No caffeine after 6 pm the night before the test, the test duration is about 4 hours.

## 2024-01-24 ENCOUNTER — HOSPITAL ENCOUNTER (OUTPATIENT)
Dept: CARDIOLOGY | Facility: HOSPITAL | Age: 44
Discharge: HOME | End: 2024-01-24
Attending: NURSE PRACTITIONER
Payer: COMMERCIAL

## 2024-01-24 ENCOUNTER — HOSPITAL ENCOUNTER (OUTPATIENT)
Dept: NUCLEAR MEDICINE | Facility: HOSPITAL | Age: 44
Setting detail: NUCLEAR MEDICINE
Discharge: HOME | End: 2024-01-24
Attending: NURSE PRACTITIONER
Payer: COMMERCIAL

## 2024-01-24 DIAGNOSIS — Z01.818 PREOP GENERAL PHYSICAL EXAM: ICD-10-CM

## 2024-01-24 DIAGNOSIS — I21.29 SEPTAL INFARCTION (H): ICD-10-CM

## 2024-01-24 LAB
CV BLOOD PRESSURE: 70 MMHG
CV STRESS MAX HR HE: 143
NUC STRESS EJECTION FRACTION: 72 %
RATE PRESSURE PRODUCT: NORMAL
STRESS ECHO BASELINE DIASTOLIC HE: 92
STRESS ECHO BASELINE HR: 87 BPM
STRESS ECHO BASELINE SYSTOLIC BP: 160
STRESS ECHO CALCULATED PERCENT HR: 81 %
STRESS ECHO LAST STRESS DIASTOLIC BP: 80
STRESS ECHO LAST STRESS SYSTOLIC BP: 186
STRESS ECHO POST ESTIMATED WORKLOAD: 5 METS
STRESS ECHO POST EXERCISE DUR MIN: 3 MIN
STRESS ECHO POST EXERCISE DUR SEC: 15 SEC
STRESS ECHO TARGET HR: 177

## 2024-01-24 PROCEDURE — 93017 CV STRESS TEST TRACING ONLY: CPT

## 2024-01-24 PROCEDURE — A9500 TC99M SESTAMIBI: HCPCS | Performed by: RADIOLOGY

## 2024-01-24 PROCEDURE — 258N000003 HC RX IP 258 OP 636: Performed by: INTERNAL MEDICINE

## 2024-01-24 PROCEDURE — 93018 CV STRESS TEST I&R ONLY: CPT | Performed by: INTERNAL MEDICINE

## 2024-01-24 PROCEDURE — 343N000001 HC RX 343: Performed by: RADIOLOGY

## 2024-01-24 PROCEDURE — 78452 HT MUSCLE IMAGE SPECT MULT: CPT

## 2024-01-24 PROCEDURE — 93016 CV STRESS TEST SUPVJ ONLY: CPT | Performed by: INTERNAL MEDICINE

## 2024-01-24 RX ORDER — SODIUM CHLORIDE 9 MG/ML
INJECTION, SOLUTION INTRAVENOUS ONCE
Status: COMPLETED | OUTPATIENT
Start: 2024-01-24 | End: 2024-01-24

## 2024-01-24 RX ADMIN — SODIUM CHLORIDE: 9 INJECTION, SOLUTION INTRAVENOUS at 09:00

## 2024-01-24 RX ADMIN — Medication 10.5 MILLICURIE: at 07:03

## 2024-01-24 RX ADMIN — Medication 31.5 MILLICURIE: at 09:09

## 2024-02-07 ENCOUNTER — ANESTHESIA EVENT (OUTPATIENT)
Dept: SURGERY | Facility: HOSPITAL | Age: 44
End: 2024-02-07
Payer: COMMERCIAL

## 2024-02-08 NOTE — H&P (VIEW-ONLY)
Preoperative Evaluation  [facility]  [address]  [facility] [address]  Phone: [phone]  Primary Provider: Nayla Lund  Pre-op Performing Provider: NAYLA LUND  Feb 9, 2024       Kayley is a 43 year old, presenting for the following:  Pre-Op Exam        2/9/2024     3:19 PM   Additional Questions   Roomed by Hallie VAUGHN     Surgical Information  Surgery/Procedure: laparoscopic salpingectomy, hysteroscopy with D/C  Surgery Location: [facility]  Surgeon: Dr. Mendenhall  Surgery Date: 2/21/24  Time of Surgery: TBD  Where patient plans to recover: At home with family  Fax number for surgical facility: Note does not need to be faxed, will be available electronically in Epic.    Assessment & Plan     The proposed surgical procedure is considered INTERMEDIATE risk.    (Z01.818) Pre-op exam  (primary encounter diagnosis)  Comment: check labs and UA. Urine HCG am of procedure   Plan: CBC with platelets and differential, UA         Macroscopic with reflex to Microscopic and         Culture, HCG Qual, Urine ([id number]),         Comprehensive metabolic panel            (N93.9) Abnormal uterine bleeding (AUB)  Comment: check labs and UA. Urine HCG am of procedure   Plan: CBC with platelets and differential, UA         Macroscopic with reflex to Microscopic and         Culture, HCG Qual, Urine ([id number]),         Comprehensive metabolic panel            Possible Sleep Apnea:         - No identified additional risk factors other than previously addressed    Antiplatelet or Anticoagulation Medication Instructions   - Patient is on no antiplatelet or anticoagulation medications.    Additional Medication Instructions  Patient is to take all scheduled medications on the day of surgery  I encouraged her to continue iron supplements unless she is told otherwise by surgery. She has her menses currently and has heavy bleeding.     Recommendation  APPROVAL GIVEN to proceed with proposed procedure pending review  of diagnostic evaluation. Increased Cozaar to 100 mg daily from 75 mg daily. She will go to the clinic in 1 week for BP check only. If BP is controlled, she can proceed with planned surgical procedure.     2/20/2024   On recheck of BP on 2/19/2024 she is stable at 140/82 and can proceed with planned surgical procedure.     BMI 49    Subjective       HPI related to upcoming procedure: Heavy irregular menstrual periods. Pre operative examination.     EKG change on previous pre-op done for same procedure. Negative stress test.         2/9/2024     2:57 PM   Preop Questions   1. Have you ever had a heart attack or stroke? No   2. Have you ever had surgery on your heart or blood vessels, such as a stent placement, a coronary artery bypass, or surgery on an artery in your head, neck, heart, or legs? No   3. Do you have chest pain with activity? No   4. Do you have a history of  heart failure? No   5. Do you currently have a cold, bronchitis or symptoms of other infection? No   6. Do you have a cough, shortness of breath, or wheezing? No   7. Do you or anyone in your family have previous history of blood clots? YES - Maternal uncle and cousin. Factor 5  She has tested negative for factor 5.    8. Do you or does anyone in your family have a serious bleeding problem such as prolonged bleeding following surgeries or cuts? No   9. Have you ever had problems with anemia or been told to take iron pills? YES - heavy menstrual bleeding    10. Have you had any abnormal blood loss such as black, tarry or bloody stools, or abnormal vaginal bleeding? YES-heavy menstrual bleeding    11. Have you ever had a blood transfusion? No   12. Are you willing to have a blood transfusion if it is medically needed before, during, or after your surgery? Yes   13. Have you or any of your relatives ever had problems with anesthesia? No   14. Do you have sleep apnea, excessive snoring or daytime drowsiness? YES - Snoring    14a. Do you have a CPAP  machine? No   15. Do you have any artifical heart valves or other implanted medical devices like a pacemaker, defibrillator, or continuous glucose monitor? No   16. Do you have artificial joints? No   17. Are you allergic to latex? No   18. Is there any chance that you may be pregnant? No     METS 4+     Health Care Directive  Patient does not have a Health Care Directive or Living Will: Discussed advance care planning with patient; however, patient declined at this time.    Preoperative Review of    reviewed - no record of controlled substances prescribed.      Status of Chronic Conditions:  See problem list for active medical problems.  Problems all longstanding and stable, except as noted/documented.  See ROS for pertinent symptoms related to these conditions.    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     Priority: Medium    Dyslipidemia 08/07/2012     Priority: Medium     Low HDL, high triglycerides      Seasonal allergic rhinitis 08/04/2010     Priority: Medium    Family history of diabetes mellitus      Priority: Medium      Past Medical History:   Diagnosis Date    Abnormal uterine bleeding (AUB) 10/3/2022    Anxiety     Panic attack    Atypical nevus 12/22/2017    Dyslipidemia 8/7/2012    Low HDL, high triglycerides    Essential hypertension 12/22/2017    LISA (generalized anxiety disorder) 12/22/2017    Herpes simplex type 2  infection [id number]    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    mole removed  2019    Removed in between toes on right foot betwwen 2nd and 3rd toe    TONSILLECTOMY, ADENOIDECTOMY, COMBINED  1993     Current Outpatient Medications   Medication Sig Dispense Refill    DULoxetine (CYMBALTA) 30 MG capsule Take 1 capsule (30 mg) by mouth daily 90 capsule 3    Ferrous Sulfate (IRON) 325 (65 Fe) MG tablet Take 1 tablet by mouth daily      hydrOXYzine (ATARAX) 25 MG tablet TAKE 1 TABLET BY MOUTH 3 TIMES DAILY AS NEEDED FOR ANXIETY 60 tablet 3    losartan (COZAAR) 100 MG tablet Take 1 tablet (100 mg) by mouth daily for 90 days 90 tablet 0    medroxyPROGESTERone (PROVERA) 10 MG tablet Take 1 tablet (10 mg) by mouth 2 times daily 60 tablet 0    omeprazole (PRILOSEC) 20 MG DR capsule Take 1 capsule (20 mg) by mouth daily 90 capsule 3    Prenatal Vit-Fe Fumarate-FA (PRENATAL MULTIVITAMIN  WITH IRON) 28-0.8 MG TABS Take 1 tablet by mouth daily 90 tablet 3    propranolol ER (INDERAL LA) 160 MG 24 hr capsule Take 1 capsule (160 mg) by mouth daily 90 capsule 3       Allergies   Allergen Reactions    Amoxicillin     Sulfa Antibiotics         Social History     Tobacco Use    Smoking status: Every Day     Packs/day: 0.50     Years: 28.00     Additional pack years: 0.00     Total pack years: 14.00     Types: Cigarettes     Start date: 8/21/1996    Smokeless tobacco: Never    Tobacco comments:     has patches going to start soon    Substance Use Topics     "Alcohol use: Yes     Comment: once a month     Family History   Problem Relation Age of Onset    Migraines Mother     Hypertension Mother     Alcoholism Father     Mental Illness Father     Lupus Paternal Aunt     Hypertension Maternal Grandmother     Factor V Leiden deficiency Maternal Grandfather     Diabetes Type 2  Maternal Grandfather     Hypertension Paternal Grandmother     Cancer - colorectal Paternal Grandmother     Cancer Paternal Grandfather         stomach    Lupus Paternal Grandfather     Factor V Leiden deficiency Maternal Uncle     Heart Disease Maternal Uncle         Multiple heart attacks    Factor V Leiden deficiency Maternal Aunt     Breast Cancer Maternal Aunt 45    Factor V Leiden deficiency Maternal Uncle     Breast Cancer Cousin 38    Lung Cancer Cousin         also foot cancer     History   Drug Use No         Review of Systems    Review of Systems  CONSTITUTIONAL: NEGATIVE for fever, chills, change in weight  INTEGUMENTARY/SKIN: NEGATIVE for worrisome rashes, moles or lesions  EYES: NEGATIVE for vision changes or irritation  ENT/MOUTH: NEGATIVE for ear, mouth and throat problems  RESP: NEGATIVE for significant cough or SOB  BREAST: NEGATIVE for masses, tenderness or discharge  CV: NEGATIVE for chest pain, palpitations or peripheral edema  GI: NEGATIVE for nausea, abdominal pain, heartburn, or change in bowel habits  : NEGATIVE for frequency, dysuria, or hematuria  MUSCULOSKELETAL: NEGATIVE for significant arthralgias or myalgia  NEURO: NEGATIVE for weakness, dizziness or paresthesias  ENDOCRINE: NEGATIVE for temperature intolerance, skin/hair changes  HEME: NEGATIVE for bleeding problems. +heavy menstrual periods (currently has menses)   PSYCHIATRIC: NEGATIVE for changes in mood or affect  Objective    BP (!) 170/84 (BP Location: Left arm, Patient Position: Sitting, Cuff Size: Adult Large)   Pulse 76   Temp 98.9  F (37.2  C) (Tympanic)   Ht 1.549 m (5' 1\")   Wt 118.6 kg (261 lb 6.4 oz) " "  SpO2 97%   BMI 49.39 kg/m     Estimated body mass index is 49.39 kg/m  as calculated from the following:    Height as of this encounter: 1.549 m (5' 1\").    Weight as of this encounter: 118.6 kg (261 lb 6.4 oz).  Physical Exam  GENERAL: alert and no distress  EYES: Eyes grossly normal to inspection, PERRL and conjunctivae and sclerae normal  HENT: ear canals and TM's normal, nose and mouth without ulcers or lesions  NECK: no adenopathy, no asymmetry, masses, or scars  RESP: lungs clear to auscultation - no rales, rhonchi or wheezes  CV: regular rate and rhythm, normal S1 S2, no S3 or S4, no murmur, click or rub, no peripheral edema  ABDOMEN: soft, nontender, no hepatosplenomegaly, no masses and bowel sounds normal  MS: no gross musculoskeletal defects noted, no edema  SKIN: no suspicious lesions or rashes  NEURO: Normal strength and tone, mentation intact and speech normal  PSYCH: mentation appears normal, affect normal/bright        Diagnostics    Results for orders placed or performed in visit on 02/09/24   Comprehensive metabolic panel     Status: Abnormal   Result Value Ref Range    Sodium 137 135 - 145 mmol/L    Potassium 4.1 3.4 - 5.3 mmol/L    Carbon Dioxide (CO2) 24 22 - 29 mmol/L    Anion Gap 12 7 - 15 mmol/L    Urea Nitrogen 14.5 6.0 - 20.0 mg/dL    Creatinine 0.76 0.51 - 0.95 mg/dL    GFR Estimate >90 >60 mL/min/1.73m2    Calcium 9.1 8.6 - 10.0 mg/dL    Chloride 101 98 - 107 mmol/L    Glucose 98 70 - 99 mg/dL    Alkaline Phosphatase 139 40 - 150 U/L    AST 27 0 - 45 U/L    ALT 55 (H) 0 - 50 U/L    Protein Total 7.6 6.4 - 8.3 g/dL    Albumin 4.2 3.5 - 5.2 g/dL    Bilirubin Total 0.3 <=1.2 mg/dL   UA Macroscopic with reflex to Microscopic and Culture     Status: Abnormal    Specimen: Urine, Clean Catch   Result Value Ref Range    Color Urine Yellow Colorless, Straw, Light Yellow, Yellow    Appearance Urine Slightly Cloudy (A) Clear    Glucose Urine Negative Negative mg/dL    Bilirubin Urine Negative " Negative    Ketones Urine Negative Negative mg/dL    Specific Gravity Urine >=1.030 1.003 - 1.035    Blood Urine Large (A) Negative    pH Urine 6.0 5.0 - 7.0    Protein Albumin Urine Negative Negative mg/dL    Urobilinogen Urine 0.2 0.2, 1.0 E.U./dL    Nitrite Urine Negative Negative    Leukocyte Esterase Urine Negative Negative   CBC with platelets and differential     Status: Abnormal   Result Value Ref Range    WBC Count 7.5 4.0 - 11.0 10e3/uL    RBC Count 4.29 3.80 - 5.20 10e6/uL    Hemoglobin 11.6 (L) 11.7 - 15.7 g/dL    Hematocrit 35.9 35.0 - 47.0 %    MCV 84 78 - 100 fL    MCH 27.0 26.5 - 33.0 pg    MCHC 32.3 31.5 - 36.5 g/dL    RDW 14.0 10.0 - 15.0 %    Platelet Count 348 150 - 450 10e3/uL    % Neutrophils 63 %    % Lymphocytes 20 %    % Monocytes 11 %    % Eosinophils 5 %    % Basophils 1 %    % Immature Granulocytes 0 %    Absolute Neutrophils 4.8 1.6 - 8.3 10e3/uL    Absolute Lymphocytes 1.5 0.8 - 5.3 10e3/uL    Absolute Monocytes 0.8 0.0 - 1.3 10e3/uL    Absolute Eosinophils 0.4 0.0 - 0.7 10e3/uL    Absolute Basophils 0.1 0.0 - 0.2 10e3/uL    Absolute Immature Granulocytes 0.0 <=0.4 10e3/uL   UA Microscopic with Reflex to Culture     Status: Abnormal   Result Value Ref Range    Bacteria Urine None Seen None Seen /HPF    RBC Urine >100 (A) 0-2 /HPF /HPF    WBC Urine 0-5 0-5 /HPF /HPF    Squamous Epithelials Urine Few (A) None Seen /LPF    Narrative    Urine Culture not indicated   CBC with platelets and differential     Status: Abnormal    Narrative    The following orders were created for panel order CBC with platelets and differential.  Procedure                               Abnormality         Status                     ---------                               -----------         ------                     CBC with platelets and d...[[id number]]  Abnormal            Final result                 Please view results for these tests on the individual orders.     On menses with large amount of blood in  urine.     No EKG this visit, completed in the last 90 days. Stress test also in past 90 days that was negative.     Revised Cardiac Risk Index (RCRI)  The patient has the following serious cardiovascular risks for perioperative complications:   - No serious cardiac risks = 0 points     RCRI Interpretation: 0 points: Class I (very low risk - 0.4% complication rate)      Signed Electronically by: TYREE Neville CNP  Copy of this evaluation report is provided to requesting physician.

## 2024-02-08 NOTE — PROGRESS NOTES
Preoperative Evaluation  [facility]  [address]  [facility] [place] [address]  Phone: [phone]  Primary Provider: Nayla Lund  Pre-op Performing Provider: NAYLA LUND  Feb 9, 2024       Kayley is a 43 year old, presenting for the following:  Pre-Op Exam        2/9/2024     3:19 PM   Additional Questions   Roomed by Hallie VAUGHN     Surgical Information  Surgery/Procedure: laparoscopic salpingectomy, hysteroscopy with D/C  [place] [facility] [place]: Dr. Mendenhall  Surgery Date: 2/21/24  Time of Surgery: TBD  Where patient plans to recover: At home with family  Fax number for surgical facility: Note does not need to be faxed, will be available electronically in Epic.    Assessment & Plan     The proposed surgical procedure is considered INTERMEDIATE risk.    (Z01.818) Pre-op exam  (primary encounter diagnosis)  Comment: check labs and UA. Urine HCG am of procedure   Plan: CBC with platelets and differential, UA         Macroscopic with reflex to Microscopic and         Culture, HCG Qual, Urine ([id number]),         Comprehensive metabolic panel            (N93.9) Abnormal uterine bleeding (AUB)  Comment: check labs and UA. Urine HCG am of procedure   Plan: CBC with platelets and differential, UA         Macroscopic with reflex to Microscopic and         Culture, HCG Qual, Urine ([id number]),         Comprehensive metabolic panel            Possible Sleep Apnea:         - No identified additional risk factors other than previously addressed    Antiplatelet or Anticoagulation Medication Instructions   - Patient is on no antiplatelet or anticoagulation medications.    Additional Medication Instructions  Patient is to take all scheduled medications on the day of surgery  I encouraged her to continue iron supplements unless she is told otherwise by surgery. She has her menses currently and has heavy bleeding.     Recommendation  APPROVAL GIVEN to proceed with proposed procedure pending review  of diagnostic evaluation. Increased Cozaar to 100 mg daily from 75 mg daily. She will go to the clinic in 1 week for BP check only. If BP is controlled, she can proceed with planned surgical procedure.     2/20/2024   On recheck of BP on 2/19/2024 she is stable at 140/82 and can proceed with planned surgical procedure.     BMI 49    Subjective       HPI related to upcoming procedure: Heavy irregular menstrual periods. Pre operative examination.     EKG change on previous pre-op done for same procedure. Negative stress test.         2/9/2024     2:57 PM   Preop Questions   1. Have you ever had a heart attack or stroke? No   2. Have you ever had surgery on your heart or blood vessels, such as a stent placement, a coronary artery bypass, or surgery on an artery in your head, neck, heart, or legs? No   3. Do you have chest pain with activity? No   4. Do you have a history of  heart failure? No   5. Do you currently have a cold, bronchitis or symptoms of other infection? No   6. Do you have a cough, shortness of breath, or wheezing? No   7. Do you or anyone in your family have previous history of blood clots? YES - Maternal uncle and cousin. Factor 5  She has tested negative for factor 5.    8. Do you or does anyone in your family have a serious bleeding problem such as prolonged bleeding following surgeries or cuts? No   9. Have you ever had problems with anemia or been told to take iron pills? YES - heavy menstrual bleeding    10. Have you had any abnormal blood loss such as black, tarry or bloody stools, or abnormal vaginal bleeding? YES-heavy menstrual bleeding    11. Have you ever had a blood transfusion? No   12. Are you willing to have a blood transfusion if it is medically needed before, during, or after your surgery? Yes   13. Have you or any of your relatives ever had problems with anesthesia? No   14. Do you have sleep apnea, excessive snoring or daytime drowsiness? YES - Snoring    14a. Do you have a CPAP  machine? No   15. Do you have any artifical heart valves or other implanted medical devices like a pacemaker, defibrillator, or continuous glucose monitor? No   16. Do you have artificial joints? No   17. Are you allergic to latex? No   18. Is there any chance that you may be pregnant? No     METS 4+     Health Care Directive  Patient does not have a Health Care Directive or Living Will: Discussed advance care planning with patient; however, patient declined at this time.    Preoperative Review of    reviewed - no record of controlled substances prescribed.      Status of Chronic Conditions:  See problem list for active medical problems.  Problems all longstanding and stable, except as noted/documented.  See ROS for pertinent symptoms related to these conditions.    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     Priority: Medium    Dyslipidemia 08/07/2012     Priority: Medium     Low HDL, high triglycerides      Seasonal allergic rhinitis 08/04/2010     Priority: Medium    Family history of diabetes mellitus      Priority: Medium      Past Medical History:   Diagnosis Date    Abnormal uterine bleeding (AUB) 10/3/2022    Anxiety     Panic attack    Atypical nevus 12/22/2017    Dyslipidemia 8/7/2012    Low HDL, high triglycerides    Essential hypertension 12/22/2017    LISA (generalized anxiety disorder) 12/22/2017    Herpes simplex type 2  infection [id number]    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    mole removed  2019    Removed in between toes on right foot betwwen 2nd and 3rd toe    TONSILLECTOMY, ADENOIDECTOMY, COMBINED  1993     Current Outpatient Medications   Medication Sig Dispense Refill    DULoxetine (CYMBALTA) 30 MG capsule Take 1 capsule (30 mg) by mouth daily 90 capsule 3    Ferrous Sulfate (IRON) 325 (65 Fe) MG tablet Take 1 tablet by mouth daily      hydrOXYzine (ATARAX) 25 MG tablet TAKE 1 TABLET BY MOUTH 3 TIMES DAILY AS NEEDED FOR ANXIETY 60 tablet 3    losartan (COZAAR) 100 MG tablet Take 1 tablet (100 mg) by mouth daily for 90 days 90 tablet 0    medroxyPROGESTERone (PROVERA) 10 MG tablet Take 1 tablet (10 mg) by mouth 2 times daily 60 tablet 0    omeprazole (PRILOSEC) 20 MG DR capsule Take 1 capsule (20 mg) by mouth daily 90 capsule 3    Prenatal Vit-Fe Fumarate-FA (PRENATAL MULTIVITAMIN  WITH IRON) 28-0.8 MG TABS Take 1 tablet by mouth daily 90 tablet 3    propranolol ER (INDERAL LA) 160 MG 24 hr capsule Take 1 capsule (160 mg) by mouth daily 90 capsule 3       Allergies   Allergen Reactions    Amoxicillin     Sulfa Antibiotics         Social History     Tobacco Use    Smoking status: Every Day     Packs/day: 0.50     Years: 28.00     Additional pack years: 0.00     Total pack years: 14.00     Types: Cigarettes     Start date: 8/21/1996    Smokeless tobacco: Never    Tobacco comments:     has patches going to start soon    Substance Use Topics     "Alcohol use: Yes     Comment: once a month     Family History   Problem Relation Age of Onset    Migraines Mother     Hypertension Mother     Alcoholism Father     Mental Illness Father     Lupus Paternal Aunt     Hypertension Maternal Grandmother     Factor V Leiden deficiency Maternal Grandfather     Diabetes Type 2  Maternal Grandfather     Hypertension Paternal Grandmother     Cancer - colorectal Paternal Grandmother     Cancer Paternal Grandfather         stomach    Lupus Paternal Grandfather     Factor V Leiden deficiency Maternal Uncle     Heart Disease Maternal Uncle         Multiple heart attacks    Factor V Leiden deficiency Maternal Aunt     Breast Cancer Maternal Aunt 45    Factor V Leiden deficiency Maternal Uncle     Breast Cancer Cousin 38    Lung Cancer Cousin         also foot cancer     History   Drug Use No         Review of Systems    Review of Systems  CONSTITUTIONAL: NEGATIVE for fever, chills, change in weight  INTEGUMENTARY/SKIN: NEGATIVE for worrisome rashes, moles or lesions  EYES: NEGATIVE for vision changes or irritation  ENT/MOUTH: NEGATIVE for ear, mouth and throat problems  RESP: NEGATIVE for significant cough or SOB  BREAST: NEGATIVE for masses, tenderness or discharge  CV: NEGATIVE for chest pain, palpitations or peripheral edema  GI: NEGATIVE for nausea, abdominal pain, heartburn, or change in bowel habits  : NEGATIVE for frequency, dysuria, or hematuria  MUSCULOSKELETAL: NEGATIVE for significant arthralgias or myalgia  NEURO: NEGATIVE for weakness, dizziness or paresthesias  ENDOCRINE: NEGATIVE for temperature intolerance, skin/hair changes  HEME: NEGATIVE for bleeding problems. +heavy menstrual periods (currently has menses)   PSYCHIATRIC: NEGATIVE for changes in mood or affect  Objective    BP (!) 170/84 (BP Location: Left arm, Patient Position: Sitting, Cuff Size: Adult Large)   Pulse 76   Temp 98.9  F (37.2  C) (Tympanic)   Ht 1.549 m (5' 1\")   Wt 118.6 kg (261 lb 6.4 oz) " "  SpO2 97%   BMI 49.39 kg/m     Estimated body mass index is 49.39 kg/m  as calculated from the following:    Height as of this encounter: 1.549 m (5' 1\").    Weight as of this encounter: 118.6 kg (261 lb 6.4 oz).  Physical Exam  GENERAL: alert and no distress  EYES: Eyes grossly normal to inspection, PERRL and conjunctivae and sclerae normal  HENT: ear canals and TM's normal, nose and mouth without ulcers or lesions  NECK: no adenopathy, no asymmetry, masses, or scars  RESP: lungs clear to auscultation - no rales, rhonchi or wheezes  CV: regular rate and rhythm, normal S1 S2, no S3 or S4, no murmur, click or rub, no peripheral edema  ABDOMEN: soft, nontender, no hepatosplenomegaly, no masses and bowel sounds normal  MS: no gross musculoskeletal defects noted, no edema  SKIN: no suspicious lesions or rashes  NEURO: Normal strength and tone, mentation intact and speech normal  PSYCH: mentation appears normal, affect normal/bright        Diagnostics    Results for orders placed or performed in visit on 02/09/24   Comprehensive metabolic panel     Status: Abnormal   Result Value Ref Range    Sodium 137 135 - 145 mmol/L    Potassium 4.1 3.4 - 5.3 mmol/L    Carbon Dioxide (CO2) 24 22 - 29 mmol/L    Anion Gap 12 7 - 15 mmol/L    Urea Nitrogen 14.5 6.0 - 20.0 mg/dL    Creatinine 0.76 0.51 - 0.95 mg/dL    GFR Estimate >90 >60 mL/min/1.73m2    Calcium 9.1 8.6 - 10.0 mg/dL    Chloride 101 98 - 107 mmol/L    Glucose 98 70 - 99 mg/dL    Alkaline Phosphatase 139 40 - 150 U/L    AST 27 0 - 45 U/L    ALT 55 (H) 0 - 50 U/L    Protein Total 7.6 6.4 - 8.3 g/dL    Albumin 4.2 3.5 - 5.2 g/dL    Bilirubin Total 0.3 <=1.2 mg/dL   UA Macroscopic with reflex to Microscopic and Culture     Status: Abnormal    Specimen: Urine, Clean Catch   Result Value Ref Range    Color Urine Yellow Colorless, Straw, Light Yellow, Yellow    Appearance Urine Slightly Cloudy (A) Clear    Glucose Urine Negative Negative mg/dL    Bilirubin Urine Negative " Negative    Ketones Urine Negative Negative mg/dL    Specific Gravity Urine >=1.030 1.003 - 1.035    Blood Urine Large (A) Negative    pH Urine 6.0 5.0 - 7.0    Protein Albumin Urine Negative Negative mg/dL    Urobilinogen Urine 0.2 0.2, 1.0 E.U./dL    Nitrite Urine Negative Negative    Leukocyte Esterase Urine Negative Negative   CBC with platelets and differential     Status: Abnormal   Result Value Ref Range    WBC Count 7.5 4.0 - 11.0 10e3/uL    RBC Count 4.29 3.80 - 5.20 10e6/uL    Hemoglobin 11.6 (L) 11.7 - 15.7 g/dL    Hematocrit 35.9 35.0 - 47.0 %    MCV 84 78 - 100 fL    MCH 27.0 26.5 - 33.0 pg    MCHC 32.3 31.5 - 36.5 g/dL    RDW 14.0 10.0 - 15.0 %    Platelet Count 348 150 - 450 10e3/uL    % Neutrophils 63 %    % Lymphocytes 20 %    % Monocytes 11 %    % Eosinophils 5 %    % Basophils 1 %    % Immature Granulocytes 0 %    Absolute Neutrophils 4.8 1.6 - 8.3 10e3/uL    Absolute Lymphocytes 1.5 0.8 - 5.3 10e3/uL    Absolute Monocytes 0.8 0.0 - 1.3 10e3/uL    Absolute Eosinophils 0.4 0.0 - 0.7 10e3/uL    Absolute Basophils 0.1 0.0 - 0.2 10e3/uL    Absolute Immature Granulocytes 0.0 <=0.4 10e3/uL   UA Microscopic with Reflex to Culture     Status: Abnormal   Result Value Ref Range    Bacteria Urine None Seen None Seen /HPF    RBC Urine >100 (A) 0-2 /HPF /HPF    WBC Urine 0-5 0-5 /HPF /HPF    Squamous Epithelials Urine Few (A) None Seen /LPF    Narrative    Urine Culture not indicated   CBC with platelets and differential     Status: Abnormal    Narrative    The following orders were created for panel order CBC with platelets and differential.  Procedure                               Abnormality         Status                     ---------                               -----------         ------                     CBC with platelets and d...[[id number]]  Abnormal            Final result                 Please view results for these tests on the individual orders.     On menses with large amount of blood in  urine.     No EKG this visit, completed in the last 90 days. Stress test also in past 90 days that was negative.     Revised Cardiac Risk Index (RCRI)  The patient has the following serious cardiovascular risks for perioperative complications:   - No serious cardiac risks = 0 points     RCRI Interpretation: 0 points: Class I (very low risk - 0.4% complication rate)      Signed Electronically by: TYREE Neville CNP  Copy of this evaluation report is provided to requesting physician.

## 2024-02-09 ENCOUNTER — OFFICE VISIT (OUTPATIENT)
Dept: FAMILY MEDICINE | Facility: OTHER | Age: 44
End: 2024-02-09
Attending: NURSE PRACTITIONER
Payer: COMMERCIAL

## 2024-02-09 VITALS
DIASTOLIC BLOOD PRESSURE: 84 MMHG | TEMPERATURE: 98.9 F | WEIGHT: 261.4 LBS | HEIGHT: 61 IN | HEART RATE: 76 BPM | OXYGEN SATURATION: 97 % | BODY MASS INDEX: 49.35 KG/M2 | SYSTOLIC BLOOD PRESSURE: 170 MMHG

## 2024-02-09 DIAGNOSIS — Z01.818 PREOP GENERAL PHYSICAL EXAM: ICD-10-CM

## 2024-02-09 DIAGNOSIS — Z01.818 PRE-OP EXAM: Primary | ICD-10-CM

## 2024-02-09 DIAGNOSIS — N93.9 ABNORMAL UTERINE BLEEDING (AUB): ICD-10-CM

## 2024-02-09 DIAGNOSIS — I10 ESSENTIAL HYPERTENSION: ICD-10-CM

## 2024-02-09 LAB
ALBUMIN SERPL BCG-MCNC: 4.2 G/DL (ref 3.5–5.2)
ALBUMIN UR-MCNC: NEGATIVE MG/DL
ALP SERPL-CCNC: 139 U/L (ref 40–150)
ALT SERPL W P-5'-P-CCNC: 55 U/L (ref 0–50)
ANION GAP SERPL CALCULATED.3IONS-SCNC: 12 MMOL/L (ref 7–15)
APPEARANCE UR: ABNORMAL
AST SERPL W P-5'-P-CCNC: 27 U/L (ref 0–45)
BACTERIA #/AREA URNS HPF: ABNORMAL /HPF
BASOPHILS # BLD AUTO: 0.1 10E3/UL (ref 0–0.2)
BASOPHILS NFR BLD AUTO: 1 %
BILIRUB SERPL-MCNC: 0.3 MG/DL
BILIRUB UR QL STRIP: NEGATIVE
BUN SERPL-MCNC: 14.5 MG/DL (ref 6–20)
CALCIUM SERPL-MCNC: 9.1 MG/DL (ref 8.6–10)
CHLORIDE SERPL-SCNC: 101 MMOL/L (ref 98–107)
COLOR UR AUTO: YELLOW
CREAT SERPL-MCNC: 0.76 MG/DL (ref 0.51–0.95)
DEPRECATED HCO3 PLAS-SCNC: 24 MMOL/L (ref 22–29)
EGFRCR SERPLBLD CKD-EPI 2021: >90 ML/MIN/1.73M2
EOSINOPHIL # BLD AUTO: 0.4 10E3/UL (ref 0–0.7)
EOSINOPHIL NFR BLD AUTO: 5 %
ERYTHROCYTE [DISTWIDTH] IN BLOOD BY AUTOMATED COUNT: 14 % (ref 10–15)
GLUCOSE SERPL-MCNC: 98 MG/DL (ref 70–99)
GLUCOSE UR STRIP-MCNC: NEGATIVE MG/DL
HCT VFR BLD AUTO: 35.9 % (ref 35–47)
HGB BLD-MCNC: 11.6 G/DL (ref 11.7–15.7)
HGB UR QL STRIP: ABNORMAL
IMM GRANULOCYTES # BLD: 0 10E3/UL
IMM GRANULOCYTES NFR BLD: 0 %
KETONES UR STRIP-MCNC: NEGATIVE MG/DL
LEUKOCYTE ESTERASE UR QL STRIP: NEGATIVE
LYMPHOCYTES # BLD AUTO: 1.5 10E3/UL (ref 0.8–5.3)
LYMPHOCYTES NFR BLD AUTO: 20 %
MCH RBC QN AUTO: 27 PG (ref 26.5–33)
MCHC RBC AUTO-ENTMCNC: 32.3 G/DL (ref 31.5–36.5)
MCV RBC AUTO: 84 FL (ref 78–100)
MONOCYTES # BLD AUTO: 0.8 10E3/UL (ref 0–1.3)
MONOCYTES NFR BLD AUTO: 11 %
NEUTROPHILS # BLD AUTO: 4.8 10E3/UL (ref 1.6–8.3)
NEUTROPHILS NFR BLD AUTO: 63 %
NITRATE UR QL: NEGATIVE
PH UR STRIP: 6 [PH] (ref 5–7)
PLATELET # BLD AUTO: 348 10E3/UL (ref 150–450)
POTASSIUM SERPL-SCNC: 4.1 MMOL/L (ref 3.4–5.3)
PROT SERPL-MCNC: 7.6 G/DL (ref 6.4–8.3)
RBC # BLD AUTO: 4.29 10E6/UL (ref 3.8–5.2)
RBC #/AREA URNS AUTO: >100 /HPF
SODIUM SERPL-SCNC: 137 MMOL/L (ref 135–145)
SP GR UR STRIP: >=1.03 (ref 1–1.03)
SQUAMOUS #/AREA URNS AUTO: ABNORMAL /LPF
UROBILINOGEN UR STRIP-ACNC: 0.2 E.U./DL
WBC # BLD AUTO: 7.5 10E3/UL (ref 4–11)
WBC #/AREA URNS AUTO: ABNORMAL /HPF

## 2024-02-09 PROCEDURE — 85025 COMPLETE CBC W/AUTO DIFF WBC: CPT | Performed by: NURSE PRACTITIONER

## 2024-02-09 PROCEDURE — 99214 OFFICE O/P EST MOD 30 MIN: CPT | Performed by: NURSE PRACTITIONER

## 2024-02-09 PROCEDURE — 80053 COMPREHEN METABOLIC PANEL: CPT | Performed by: NURSE PRACTITIONER

## 2024-02-09 PROCEDURE — 81001 URINALYSIS AUTO W/SCOPE: CPT | Performed by: NURSE PRACTITIONER

## 2024-02-09 PROCEDURE — 36415 COLL VENOUS BLD VENIPUNCTURE: CPT | Performed by: NURSE PRACTITIONER

## 2024-02-09 RX ORDER — LOSARTAN POTASSIUM 100 MG/1
100 TABLET ORAL DAILY
Qty: 90 TABLET | Refills: 0 | Status: SHIPPED | OUTPATIENT
Start: 2024-02-09 | End: 2024-05-14

## 2024-02-09 ASSESSMENT — PAIN SCALES - GENERAL: PAINLEVEL: MODERATE PAIN (5)

## 2024-02-09 NOTE — PATIENT INSTRUCTIONS
Preparing for Your Surgery  Getting started  A nurse will call you to review your health history and instructions. They will give you an arrival time based on your scheduled surgery time. Please be ready to share:  Your doctor's clinic name and phone number  Your medical, surgical, and anesthesia history  A list of allergies and sensitivities  A list of medicines, including herbal treatments and over-the-counter drugs  Whether the patient has a legal guardian (ask how to send us the papers in advance)  Please tell us if you're pregnant--or if there's any chance you might be pregnant. Some surgeries may injure a fetus (unborn baby), so they require a pregnancy test. Surgeries that are safe for a fetus don't always need a test, and you can choose whether to have one.   If you have a child who's having surgery, please ask for a copy of Preparing for Your Child's Surgery.    Preparing for surgery  Within 10 to 30 days of surgery: Have a pre-op exam (sometimes called an H&P, or History and Physical). This can be done at a clinic or pre-operative center.  If you're having a , you may not need this exam. Talk to your care team.  At your pre-op exam, talk to your care team about all medicines you take. If you need to stop any medicines before surgery, ask when to start taking them again.  We do this for your safety. Many medicines can make you bleed too much during surgery. Some change how well surgery (anesthesia) drugs work.  Call your insurance company to let them know you're having surgery. (If you don't have insurance, call [phone].)  Call your clinic if there's any change in your health. This includes signs of a cold or flu (sore throat, runny nose, cough, rash, fever). It also includes a scrape or scratch near the surgery site.  If you have questions on the day of surgery, call your hospital or surgery center.  Eating and drinking guidelines  For your safety: Unless your surgeon tells you otherwise,  follow the guidelines below.  Eat and drink as usual until 8 hours before you arrive for surgery. After that, no food or milk.  Drink clear liquids until 2 hours before you arrive. These are liquids you can see through, like water, Gatorade, and Propel Water. They also include plain black coffee and tea (no cream or milk), candy, and breath mints. You can spit out gum when you arrive.  If you drink alcohol: Stop drinking it the night before surgery.  If your care team tells you to take medicine on the morning of surgery, it's okay to take it with a sip of water.  Preventing infection  Shower or bathe the night before and morning of your surgery. Follow the instructions your clinic gave you. (If no instructions, use regular soap.)  Don't shave or clip hair near your surgery site. We'll remove the hair if needed.  Don't smoke or vape the morning of surgery. You may chew nicotine gum up to 2 hours before surgery. A nicotine patch is okay.  Note: Some surgeries require you to completely quit smoking and nicotine. Check with your surgeon.  Your care team will make every effort to keep you safe from infection. We will:  Clean our hands often with soap and water (or an alcohol-based hand rub).  Clean the skin at your surgery site with a special soap that kills germs.  Give you a special gown to keep you warm. (Cold raises the risk of infection.)  Wear special hair covers, masks, gowns and gloves during surgery.  Give antibiotic medicine, if prescribed. Not all surgeries need antibiotics.  What to bring on the day of surgery  Photo ID and insurance card  Copy of your health care directive, if you have one  Glasses and hearing aids (bring cases)  You can't wear contacts during surgery  Inhaler and eye drops, if you use them (tell us about these when you arrive)  CPAP machine or breathing device, if you use them  A few personal items, if spending the night  If you have . . .  A pacemaker, ICD (cardiac defibrillator) or other  implant: Bring the ID card.  An implanted stimulator: Bring the remote control.  A legal guardian: Bring a copy of the certified (court-stamped) guardianship papers.  Please remove any jewelry, including body piercings. Leave jewelry and other valuables at home.  If you're going home the day of surgery  You must have a responsible adult drive you home. They should stay with you overnight as well.  If you don't have someone to stay with you, and you aren't safe to go home alone, we may keep you overnight. Insurance often won't pay for this.  After surgery  If it's hard to control your pain or you need more pain medicine, please call your surgeon's office.  Questions?   If you have any questions for your care team, list them here: _________________________________________________________________________________________________________________________________________________________________________ ____________________________________ ____________________________________ ____________________________________  For informational purposes only. Not to replace the advice of your health care provider. Copyright   2003, 2019 [facility] [facility]. All rights reserved. Clinically reviewed by Pauline Mckeon MD. [employer] [id number] - REV 12/22.    How to Take Your Medication Before Surgery  - Take all of your medications before surgery as usual

## 2024-02-14 ASSESSMENT — LIFESTYLE VARIABLES: TOBACCO_USE: 1

## 2024-02-14 NOTE — ANESTHESIA PREPROCEDURE EVALUATION
Anesthesia Pre-Procedure Evaluation    Patient: Kayley Pak   MRN: [id number] : 1980        Procedure : Procedure(s):  HYSTEROSCOPY, WITH DILATION AND CURETTAGE OF UTERUS  Nexplanon Removal Left Arm  Bilateral Laparoscopic Salpingectomy          Past Medical History:   Diagnosis Date     Abnormal uterine bleeding (AUB) 10/3/2022     Anxiety     Panic attack     Atypical nevus 2017     Dyslipidemia 2012    Low HDL, high triglycerides     Essential hypertension 2017     LISA (generalized anxiety disorder) 2017     Herpes simplex type 2 infection 2012     Herpesvirus 2 2012     Hypertension      Morbid obesity (H) 2020     Obesity      Obesity, morbid, BMI 50 or higher (H) 2020     Panic attack 2017     Pes anserine bursitis 2015     Psoriasis      Seasonal allergic rhinitis 2010     Sleep apnea, unspecified type 10/20/2017     TMJ (dislocation of temporomandibular joint) 2009    Resolved     Tobacco abuse 2021      Past Surgical History:   Procedure Laterality Date     IR RENAL STONE REMOVAL RIGHT  2010     miscarriage      Age 19 D & C.     mole removed  2019    Removed in between toes on right foot kelton 2nd and 3rd toe     TONSILLECTOMY, ADENOIDECTOMY, COMBINED        Allergies   Allergen Reactions     Amoxicillin      Sulfa Antibiotics       Social History     Tobacco Use     Smoking status: Every Day     Packs/day: 0.50     Years: 28.00     Additional pack years: 0.00     Total pack years: 14.00     Types: Cigarettes     Start date: 1996     Smokeless tobacco: Never     Tobacco comments:     has patches going to start soon    Substance Use Topics     Alcohol use: Yes     Comment: once a month      Wt Readings from Last 1 Encounters:   24 118.6 kg (261 lb 6.4 oz)        Anesthesia Evaluation   Pt has had prior anesthetic. Type: MAC.    No history of anesthetic complications       ROS/MED HX  ENT/Pulmonary: Comment:  TMJ    (+) sleep apnea, doesn't use CPAP,   JONATHAN risk factors, snores loudly, hypertension, obese,   allergic rhinitis,     tobacco use, Current use,  14  Pack-Year Hx,                      Neurologic:  - neg neurologic ROS  (-) no CVA   Cardiovascular:     (+) Dyslipidemia hypertension- -   -  - -                                 Previous cardiac testing   Echo: Date: Results:    Stress Test:  Date: 1/24/24 Results:  The nuclear stress test is negative for inducible myocardial ischemia   or infarction.      The left ventricular ejection fraction at rest is 70%.  The left   ventricular ejection fraction at stress is 72%.      The patient's exercise capacity is moderately impaired.      There is no prior study for comparison.     ECG Reviewed:  Date: 1/4/24 Results:  SR with septal infarct age undetermined   Last EKG NSR with sinus arrhythmia  Cath:  Date: Results:   (-) taking anticoagulants/antiplatelets and GERMAIN   METS/Exercise Tolerance: >4 METS    Hematologic: Comments: Maternal uncle and cousin. Factor 5  She has tested negative for factor 5.    Heavy menstrual bleeding - currently on iron supplements.      Musculoskeletal:  - neg musculoskeletal ROS     GI/Hepatic: Comment: Hasn't taken meds in a week, has no heartburn today, but stated has eaten either    (+) GERD (on omeprazole), Asymptomatic on medication,                  Renal/Genitourinary:     (+)       Nephrolithiasis ,       Endo:     (+)               Obesity,       Psychiatric/Substance Use:     (+) psychiatric history other (comment) and anxiety (panic attacks)       Infectious Disease:  - neg infectious disease ROS     Malignancy:  - neg malignancy ROS     Other:  - neg other ROS          Physical Exam    Airway        Mallampati: III   TM distance: > 3 FB   Neck ROM: full   Mouth opening: > 3 cm    Respiratory Devices and Support         Dental       (+) Multiple visibly decayed, broken teeth      Cardiovascular   cardiovascular exam normal      "  Rhythm and rate: regular and normal     Pulmonary           breath sounds clear to auscultation   (+) decreased breath sounds       OUTSIDE LABS:  CBC:   Lab Results   Component Value Date    WBC 7.5 02/09/2024    WBC 6.3 01/04/2024    HGB 11.6 (L) 02/09/2024    HGB 12.6 01/04/2024    HCT 35.9 02/09/2024    HCT 40.9 01/04/2024     02/09/2024     01/04/2024     BMP:   Lab Results   Component Value Date     02/09/2024     01/04/2024    POTASSIUM 4.1 02/09/2024    POTASSIUM 4.4 01/04/2024    CHLORIDE 101 02/09/2024    CHLORIDE 101 01/04/2024    CO2 24 02/09/2024    CO2 24 01/04/2024    BUN 14.5 02/09/2024    BUN 15.6 01/04/2024    CR 0.76 02/09/2024    CR 0.75 01/04/2024    GLC 98 02/09/2024     (H) 01/04/2024     COAGS: No results found for: \"PTT\", \"INR\", \"FIBR\"  POC:   Lab Results   Component Value Date    HCG Negative 08/06/2012     HEPATIC:   Lab Results   Component Value Date    ALBUMIN 4.2 02/09/2024    PROTTOTAL 7.6 02/09/2024    ALT 55 (H) 02/09/2024    AST 27 02/09/2024    ALKPHOS 139 02/09/2024    BILITOTAL 0.3 02/09/2024     OTHER:   Lab Results   Component Value Date    A1C 5.8 09/25/2009    JUSTICE 9.1 02/09/2024    TSH 2.12 10/26/2023    SED 20 05/01/2008       Anesthesia Plan    ASA Status:  3    NPO Status:  NPO Appropriate    Anesthesia Type: General.     - Airway: ETT   Induction: Intravenous, RSI.           Consents    Anesthesia Plan(s) and associated risks, benefits, and realistic alternatives discussed. Questions answered and patient/representative(s) expressed understanding.     - Discussed: Risks, Benefits and Alternatives for BOTH SEDATION and the PROCEDURE were discussed     - Discussed with:  Patient      - Extended Intubation/Ventilatory Support Discussed: No.      - Patient is DNR/DNI Status: No     Use of blood products discussed: No .     Postoperative Care       PONV prophylaxis: Ondansetron (or other 5HT-3), Dexamethasone or Solumedrol " "    Comments:    Other Comments: HP 2/9/24  Increased Cozaar to 100 mg daily from 75 mg daily. 170/84 She will go to the clinic in 1 week for BP check only. If BP is controlled, she can proceed with planned surgical procedure.     BP recheck 2/19/24: 160/90 and second 15 mins later was 140/82.     Urine HCG ordered.     EKG: sinus rhythm with septal infarct; age undetermined  Prior EKG SR with sinus arrhythmia   Stress test neg on 1/24/24           TYREE Betancourt CRNA    I have reviewed the pertinent notes and labs in the chart from the past 30 days and (re)examined the patient.  Any updates or changes from those notes are reflected in this note.              # Severe Obesity: Estimated body mass index is 49.39 kg/m  as calculated from the following:    Height as of 2/9/24: 1.549 m (5' 1\").    Weight as of 2/9/24: 118.6 kg (261 lb 6.4 oz).      "

## 2024-02-16 NOTE — OR NURSING
Instructed to hold NSAIDs, ASA, vitamins & supplements starting today 2/16, continue other medications up to day before surgery, take duloxetine, losartan & propanolol morning of surgery.

## 2024-02-19 ENCOUNTER — TELEPHONE (OUTPATIENT)
Dept: FAMILY MEDICINE | Facility: OTHER | Age: 44
End: 2024-02-19

## 2024-02-19 ENCOUNTER — ALLIED HEALTH/NURSE VISIT (OUTPATIENT)
Dept: FAMILY MEDICINE | Facility: OTHER | Age: 44
End: 2024-02-19
Attending: SURGERY
Payer: COMMERCIAL

## 2024-02-19 VITALS — SYSTOLIC BLOOD PRESSURE: 140 MMHG | DIASTOLIC BLOOD PRESSURE: 82 MMHG

## 2024-02-19 DIAGNOSIS — I10 ESSENTIAL HYPERTENSION: Primary | ICD-10-CM

## 2024-02-19 PROCEDURE — 99207 PR NO CHARGE NURSE ONLY: CPT

## 2024-02-19 NOTE — PROGRESS NOTES
I met with Kayley Pak at the request of Dr. Lund to recheck her blood pressure.  Blood pressure medications on the med list were reviewed with patient.    Patient has taken all medications as per usual regimen: Yes  Patient reports tolerating them without any issues or concerns: Yes    There were no vitals filed for this visit.    After 5 minutes, the patient's blood pressure remained greater than or equal to 140/90.    Is the patient currently having any chest pain? No  Does the patient currently have a headache? No  Does the patient currently have any vision changes? No  Does the patient currently have any nausea? No  Does the patient currently have any abdominal pain? No    The previous encounter was reviewed.  The patient was discharged and the note will be sent to the provider for final review.

## 2024-02-19 NOTE — CONFIDENTIAL NOTE
Patient came in for a nurse only today for bp check. Initial was 160/90 and second 15 mins later was 140/82. Patient describes no symptoms and stated that you were hoping to get the top number to 60. Patient was sent home. Please let us know if there is anything you would like done as follow up. Thank you.

## 2024-02-21 ENCOUNTER — HOSPITAL ENCOUNTER (OUTPATIENT)
Facility: HOSPITAL | Age: 44
Discharge: HOME | End: 2024-02-21
Attending: OBSTETRICS & GYNECOLOGY | Admitting: OBSTETRICS & GYNECOLOGY
Payer: COMMERCIAL

## 2024-02-21 ENCOUNTER — ANESTHESIA (OUTPATIENT)
Dept: SURGERY | Facility: HOSPITAL | Age: 44
End: 2024-02-21
Payer: COMMERCIAL

## 2024-02-21 ENCOUNTER — APPOINTMENT (OUTPATIENT)
Dept: LAB | Facility: HOSPITAL | Age: 44
End: 2024-02-21
Attending: OBSTETRICS & GYNECOLOGY
Payer: COMMERCIAL

## 2024-02-21 VITALS
RESPIRATION RATE: 18 BRPM | DIASTOLIC BLOOD PRESSURE: 94 MMHG | OXYGEN SATURATION: 96 % | TEMPERATURE: 97.3 F | HEART RATE: 74 BPM | HEIGHT: 61 IN | SYSTOLIC BLOOD PRESSURE: 151 MMHG | WEIGHT: 260 LBS | BODY MASS INDEX: 49.09 KG/M2

## 2024-02-21 DIAGNOSIS — N93.9 ABNORMAL UTERINE BLEEDING (AUB): ICD-10-CM

## 2024-02-21 DIAGNOSIS — Z30.2 ENCOUNTER FOR STERILIZATION: ICD-10-CM

## 2024-02-21 DIAGNOSIS — Z98.890 POST-OPERATIVE STATE: Primary | ICD-10-CM

## 2024-02-21 LAB — HCG UR QL: NEGATIVE

## 2024-02-21 PROCEDURE — 272N000001 HC OR GENERAL SUPPLY STERILE: Performed by: OBSTETRICS & GYNECOLOGY

## 2024-02-21 PROCEDURE — 258N000003 HC RX IP 258 OP 636: Performed by: NURSE ANESTHETIST, CERTIFIED REGISTERED

## 2024-02-21 PROCEDURE — 250N000013 HC RX MED GY IP 250 OP 250 PS 637: Performed by: NURSE ANESTHETIST, CERTIFIED REGISTERED

## 2024-02-21 PROCEDURE — 370N000017 HC ANESTHESIA TECHNICAL FEE, PER MIN: Performed by: OBSTETRICS & GYNECOLOGY

## 2024-02-21 PROCEDURE — 88302 TISSUE EXAM BY PATHOLOGIST: CPT | Mod: TC | Performed by: OBSTETRICS & GYNECOLOGY

## 2024-02-21 PROCEDURE — 58558 HYSTEROSCOPY BIOPSY: CPT | Performed by: OBSTETRICS & GYNECOLOGY

## 2024-02-21 PROCEDURE — 250N000025 HC SEVOFLURANE, PER MIN: Performed by: OBSTETRICS & GYNECOLOGY

## 2024-02-21 PROCEDURE — 81025 URINE PREGNANCY TEST: CPT | Performed by: NURSE ANESTHETIST, CERTIFIED REGISTERED

## 2024-02-21 PROCEDURE — 250N000011 HC RX IP 250 OP 636: Performed by: NURSE ANESTHETIST, CERTIFIED REGISTERED

## 2024-02-21 PROCEDURE — 58661 LAPAROSCOPY REMOVE ADNEXA: CPT | Mod: 50 | Performed by: OBSTETRICS & GYNECOLOGY

## 2024-02-21 PROCEDURE — 710N000012 HC RECOVERY PHASE 2, PER MINUTE: Performed by: OBSTETRICS & GYNECOLOGY

## 2024-02-21 PROCEDURE — 360N000076 HC SURGERY LEVEL 3, PER MIN: Performed by: OBSTETRICS & GYNECOLOGY

## 2024-02-21 PROCEDURE — 250N000009 HC RX 250: Performed by: NURSE ANESTHETIST, CERTIFIED REGISTERED

## 2024-02-21 PROCEDURE — 88305 TISSUE EXAM BY PATHOLOGIST: CPT | Mod: 26 | Performed by: PATHOLOGY

## 2024-02-21 PROCEDURE — 58661 LAPAROSCOPY REMOVE ADNEXA: CPT | Performed by: NURSE ANESTHETIST, CERTIFIED REGISTERED

## 2024-02-21 PROCEDURE — 88302 TISSUE EXAM BY PATHOLOGIST: CPT | Mod: 26 | Performed by: PATHOLOGY

## 2024-02-21 PROCEDURE — 999N000141 HC STATISTIC PRE-PROCEDURE NURSING ASSESSMENT: Performed by: OBSTETRICS & GYNECOLOGY

## 2024-02-21 PROCEDURE — 250N000013 HC RX MED GY IP 250 OP 250 PS 637: Performed by: OBSTETRICS & GYNECOLOGY

## 2024-02-21 PROCEDURE — 710N000010 HC RECOVERY PHASE 1, LEVEL 2, PER MIN: Performed by: OBSTETRICS & GYNECOLOGY

## 2024-02-21 PROCEDURE — 250N000011 HC RX IP 250 OP 636: Performed by: OBSTETRICS & GYNECOLOGY

## 2024-02-21 RX ORDER — KETOROLAC TROMETHAMINE 30 MG/ML
30 INJECTION, SOLUTION INTRAMUSCULAR; INTRAVENOUS ONCE
Status: COMPLETED | OUTPATIENT
Start: 2024-02-21 | End: 2024-02-21

## 2024-02-21 RX ORDER — HYDROXYZINE HYDROCHLORIDE 25 MG/1
50 TABLET, FILM COATED ORAL
Status: DISCONTINUED | OUTPATIENT
Start: 2024-02-21 | End: 2024-02-21 | Stop reason: HOSPADM

## 2024-02-21 RX ORDER — LIDOCAINE HYDROCHLORIDE 10 MG/ML
INJECTION, SOLUTION EPIDURAL; INFILTRATION; INTRACAUDAL; PERINEURAL
Status: DISCONTINUED
Start: 2024-02-21 | End: 2024-02-21 | Stop reason: WASHOUT

## 2024-02-21 RX ORDER — OXYCODONE HYDROCHLORIDE 5 MG/1
5 TABLET ORAL
Status: DISCONTINUED | OUTPATIENT
Start: 2024-02-21 | End: 2024-02-21 | Stop reason: HOSPADM

## 2024-02-21 RX ORDER — ONDANSETRON 4 MG/1
4 TABLET, ORALLY DISINTEGRATING ORAL
Status: DISCONTINUED | OUTPATIENT
Start: 2024-02-21 | End: 2024-02-21 | Stop reason: HOSPADM

## 2024-02-21 RX ORDER — PROPOFOL 10 MG/ML
INJECTION, EMULSION INTRAVENOUS PRN
Status: DISCONTINUED | OUTPATIENT
Start: 2024-02-21 | End: 2024-02-21

## 2024-02-21 RX ORDER — NALOXONE HYDROCHLORIDE 0.4 MG/ML
0.4 INJECTION, SOLUTION INTRAMUSCULAR; INTRAVENOUS; SUBCUTANEOUS
Status: DISCONTINUED | OUTPATIENT
Start: 2024-02-21 | End: 2024-02-21 | Stop reason: HOSPADM

## 2024-02-21 RX ORDER — OXYCODONE HYDROCHLORIDE 5 MG/1
5-10 TABLET ORAL EVERY 6 HOURS PRN
Qty: 12 TABLET | Refills: 0 | Status: SHIPPED | OUTPATIENT
Start: 2024-02-21 | End: 2024-03-19

## 2024-02-21 RX ORDER — HYDROMORPHONE HYDROCHLORIDE 1 MG/ML
0.4 INJECTION, SOLUTION INTRAMUSCULAR; INTRAVENOUS; SUBCUTANEOUS EVERY 5 MIN PRN
Status: DISCONTINUED | OUTPATIENT
Start: 2024-02-21 | End: 2024-02-21 | Stop reason: HOSPADM

## 2024-02-21 RX ORDER — OXYCODONE HYDROCHLORIDE 5 MG/1
10 TABLET ORAL
Status: DISCONTINUED | OUTPATIENT
Start: 2024-02-21 | End: 2024-02-21 | Stop reason: HOSPADM

## 2024-02-21 RX ORDER — FENTANYL CITRATE 50 UG/ML
50 INJECTION, SOLUTION INTRAMUSCULAR; INTRAVENOUS EVERY 5 MIN PRN
Status: DISCONTINUED | OUTPATIENT
Start: 2024-02-21 | End: 2024-02-21 | Stop reason: HOSPADM

## 2024-02-21 RX ORDER — OXYCODONE HYDROCHLORIDE 5 MG/1
5 TABLET ORAL
Status: COMPLETED | OUTPATIENT
Start: 2024-02-21 | End: 2024-02-21

## 2024-02-21 RX ORDER — HYDROMORPHONE HYDROCHLORIDE 1 MG/ML
0.2 INJECTION, SOLUTION INTRAMUSCULAR; INTRAVENOUS; SUBCUTANEOUS EVERY 5 MIN PRN
Status: DISCONTINUED | OUTPATIENT
Start: 2024-02-21 | End: 2024-02-21 | Stop reason: HOSPADM

## 2024-02-21 RX ORDER — KETAMINE HYDROCHLORIDE 10 MG/ML
INJECTION INTRAMUSCULAR; INTRAVENOUS PRN
Status: DISCONTINUED | OUTPATIENT
Start: 2024-02-21 | End: 2024-02-21

## 2024-02-21 RX ORDER — ACETAMINOPHEN 325 MG/1
975 TABLET ORAL ONCE
Status: COMPLETED | OUTPATIENT
Start: 2024-02-21 | End: 2024-02-21

## 2024-02-21 RX ORDER — CEFAZOLIN SODIUM/WATER 2 G/20 ML
2 SYRINGE (ML) INTRAVENOUS SEE ADMIN INSTRUCTIONS
Status: DISCONTINUED | OUTPATIENT
Start: 2024-02-21 | End: 2024-02-21 | Stop reason: HOSPADM

## 2024-02-21 RX ORDER — ONDANSETRON 2 MG/ML
INJECTION INTRAMUSCULAR; INTRAVENOUS PRN
Status: DISCONTINUED | OUTPATIENT
Start: 2024-02-21 | End: 2024-02-21

## 2024-02-21 RX ORDER — NALOXONE HYDROCHLORIDE 0.4 MG/ML
0.2 INJECTION, SOLUTION INTRAMUSCULAR; INTRAVENOUS; SUBCUTANEOUS
Status: DISCONTINUED | OUTPATIENT
Start: 2024-02-21 | End: 2024-02-21 | Stop reason: HOSPADM

## 2024-02-21 RX ORDER — CEFAZOLIN SODIUM/WATER 2 G/20 ML
2 SYRINGE (ML) INTRAVENOUS
Status: COMPLETED | OUTPATIENT
Start: 2024-02-21 | End: 2024-02-21

## 2024-02-21 RX ORDER — LIDOCAINE HYDROCHLORIDE 20 MG/ML
INJECTION, SOLUTION INFILTRATION; PERINEURAL PRN
Status: DISCONTINUED | OUTPATIENT
Start: 2024-02-21 | End: 2024-02-21

## 2024-02-21 RX ORDER — ONDANSETRON 4 MG/1
4 TABLET, ORALLY DISINTEGRATING ORAL EVERY 30 MIN PRN
Status: DISCONTINUED | OUTPATIENT
Start: 2024-02-21 | End: 2024-02-21 | Stop reason: HOSPADM

## 2024-02-21 RX ORDER — FENTANYL CITRATE 50 UG/ML
INJECTION, SOLUTION INTRAMUSCULAR; INTRAVENOUS PRN
Status: DISCONTINUED | OUTPATIENT
Start: 2024-02-21 | End: 2024-02-21

## 2024-02-21 RX ORDER — CLINDAMYCIN PHOSPHATE 900 MG/50ML
900 INJECTION, SOLUTION INTRAVENOUS
Status: DISCONTINUED | OUTPATIENT
Start: 2024-02-21 | End: 2024-02-21 | Stop reason: ALTCHOICE

## 2024-02-21 RX ORDER — ALBUTEROL SULFATE 0.83 MG/ML
2.5 SOLUTION RESPIRATORY (INHALATION) EVERY 4 HOURS PRN
Status: DISCONTINUED | OUTPATIENT
Start: 2024-02-21 | End: 2024-02-21 | Stop reason: HOSPADM

## 2024-02-21 RX ORDER — LIDOCAINE 40 MG/G
CREAM TOPICAL
Status: DISCONTINUED | OUTPATIENT
Start: 2024-02-21 | End: 2024-02-21 | Stop reason: HOSPADM

## 2024-02-21 RX ORDER — LABETALOL 20 MG/4 ML (5 MG/ML) INTRAVENOUS SYRINGE
10
Status: DISCONTINUED | OUTPATIENT
Start: 2024-02-21 | End: 2024-02-21 | Stop reason: HOSPADM

## 2024-02-21 RX ORDER — PROPOFOL 10 MG/ML
INJECTION, EMULSION INTRAVENOUS CONTINUOUS PRN
Status: DISCONTINUED | OUTPATIENT
Start: 2024-02-21 | End: 2024-02-21

## 2024-02-21 RX ORDER — IBUPROFEN 800 MG/1
800 TABLET, FILM COATED ORAL EVERY 8 HOURS PRN
Qty: 30 TABLET | Refills: 1 | Status: SHIPPED | OUTPATIENT
Start: 2024-02-21 | End: 2024-03-19

## 2024-02-21 RX ORDER — MISOPROSTOL 200 UG/1
200 TABLET ORAL ONCE
Status: COMPLETED | OUTPATIENT
Start: 2024-02-21 | End: 2024-02-21

## 2024-02-21 RX ORDER — SODIUM CHLORIDE, SODIUM LACTATE, POTASSIUM CHLORIDE, CALCIUM CHLORIDE 600; 310; 30; 20 MG/100ML; MG/100ML; MG/100ML; MG/100ML
INJECTION, SOLUTION INTRAVENOUS CONTINUOUS
Status: DISCONTINUED | OUTPATIENT
Start: 2024-02-21 | End: 2024-02-21 | Stop reason: HOSPADM

## 2024-02-21 RX ORDER — FENTANYL CITRATE 50 UG/ML
25 INJECTION, SOLUTION INTRAMUSCULAR; INTRAVENOUS EVERY 5 MIN PRN
Status: DISCONTINUED | OUTPATIENT
Start: 2024-02-21 | End: 2024-02-21 | Stop reason: HOSPADM

## 2024-02-21 RX ORDER — ONDANSETRON 2 MG/ML
4 INJECTION INTRAMUSCULAR; INTRAVENOUS EVERY 30 MIN PRN
Status: DISCONTINUED | OUTPATIENT
Start: 2024-02-21 | End: 2024-02-21 | Stop reason: HOSPADM

## 2024-02-21 RX ORDER — ACETAMINOPHEN 325 MG/1
650 TABLET ORAL EVERY 4 HOURS PRN
Qty: 50 TABLET | Refills: 0 | Status: SHIPPED | OUTPATIENT
Start: 2024-02-21 | End: 2024-03-19

## 2024-02-21 RX ORDER — DEXAMETHASONE SODIUM PHOSPHATE 4 MG/ML
INJECTION, SOLUTION INTRA-ARTICULAR; INTRALESIONAL; INTRAMUSCULAR; INTRAVENOUS; SOFT TISSUE PRN
Status: DISCONTINUED | OUTPATIENT
Start: 2024-02-21 | End: 2024-02-21

## 2024-02-21 RX ADMIN — ONDANSETRON 4 MG: 2 INJECTION INTRAMUSCULAR; INTRAVENOUS at 09:55

## 2024-02-21 RX ADMIN — PHENYLEPHRINE HYDROCHLORIDE 100 MCG: 10 INJECTION INTRAVENOUS at 10:11

## 2024-02-21 RX ADMIN — PHENYLEPHRINE HYDROCHLORIDE 200 MCG: 10 INJECTION INTRAVENOUS at 09:47

## 2024-02-21 RX ADMIN — ROCURONIUM BROMIDE 10 MG: 10 INJECTION INTRAVENOUS at 09:21

## 2024-02-21 RX ADMIN — SUGAMMADEX 200 MG: 100 INJECTION, SOLUTION INTRAVENOUS at 10:20

## 2024-02-21 RX ADMIN — PHENYLEPHRINE HYDROCHLORIDE 100 MCG: 10 INJECTION INTRAVENOUS at 09:51

## 2024-02-21 RX ADMIN — KETOROLAC TROMETHAMINE 30 MG: 30 INJECTION, SOLUTION INTRAMUSCULAR; INTRAVENOUS at 08:49

## 2024-02-21 RX ADMIN — FENTANYL CITRATE 25 MCG: 50 INJECTION, SOLUTION INTRAMUSCULAR; INTRAVENOUS at 11:11

## 2024-02-21 RX ADMIN — LIDOCAINE HYDROCHLORIDE 80 MG: 20 INJECTION, SOLUTION INFILTRATION; PERINEURAL at 09:21

## 2024-02-21 RX ADMIN — PHENYLEPHRINE HYDROCHLORIDE 100 MCG: 10 INJECTION INTRAVENOUS at 09:33

## 2024-02-21 RX ADMIN — PROPOFOL 40 MG: 10 INJECTION, EMULSION INTRAVENOUS at 10:24

## 2024-02-21 RX ADMIN — PHENYLEPHRINE HYDROCHLORIDE 100 MCG: 10 INJECTION INTRAVENOUS at 09:53

## 2024-02-21 RX ADMIN — PHENYLEPHRINE HYDROCHLORIDE 100 MCG: 10 INJECTION INTRAVENOUS at 09:30

## 2024-02-21 RX ADMIN — MISOPROSTOL 200 MCG: 200 TABLET ORAL at 08:45

## 2024-02-21 RX ADMIN — PHENYLEPHRINE HYDROCHLORIDE 100 MCG: 10 INJECTION INTRAVENOUS at 09:45

## 2024-02-21 RX ADMIN — PHENYLEPHRINE HYDROCHLORIDE 100 MCG: 10 INJECTION INTRAVENOUS at 09:42

## 2024-02-21 RX ADMIN — TRANEXAMIC ACID 1 G: 1 INJECTION, SOLUTION INTRAVENOUS at 10:00

## 2024-02-21 RX ADMIN — DEXAMETHASONE SODIUM PHOSPHATE 10 MG: 4 INJECTION, SOLUTION INTRA-ARTICULAR; INTRALESIONAL; INTRAMUSCULAR; INTRAVENOUS; SOFT TISSUE at 09:37

## 2024-02-21 RX ADMIN — PROPOFOL 250 MG: 10 INJECTION, EMULSION INTRAVENOUS at 09:21

## 2024-02-21 RX ADMIN — Medication 180 MG: at 09:21

## 2024-02-21 RX ADMIN — SODIUM CHLORIDE, POTASSIUM CHLORIDE, SODIUM LACTATE AND CALCIUM CHLORIDE: 600; 310; 30; 20 INJECTION, SOLUTION INTRAVENOUS at 10:13

## 2024-02-21 RX ADMIN — PHENYLEPHRINE HYDROCHLORIDE 100 MCG: 10 INJECTION INTRAVENOUS at 09:39

## 2024-02-21 RX ADMIN — PHENYLEPHRINE HYDROCHLORIDE 200 MCG: 10 INJECTION INTRAVENOUS at 09:48

## 2024-02-21 RX ADMIN — Medication 50 MG: at 09:21

## 2024-02-21 RX ADMIN — FENTANYL CITRATE 100 MCG: 50 INJECTION INTRAMUSCULAR; INTRAVENOUS at 09:21

## 2024-02-21 RX ADMIN — ROCURONIUM BROMIDE 40 MG: 10 INJECTION INTRAVENOUS at 09:36

## 2024-02-21 RX ADMIN — SODIUM CHLORIDE, POTASSIUM CHLORIDE, SODIUM LACTATE AND CALCIUM CHLORIDE: 600; 310; 30; 20 INJECTION, SOLUTION INTRAVENOUS at 09:12

## 2024-02-21 RX ADMIN — FENTANYL CITRATE 50 MCG: 50 INJECTION, SOLUTION INTRAMUSCULAR; INTRAVENOUS at 11:21

## 2024-02-21 RX ADMIN — OXYCODONE HYDROCHLORIDE 5 MG: 5 TABLET ORAL at 12:08

## 2024-02-21 RX ADMIN — PROPOFOL 150 MCG/KG/MIN: 10 INJECTION, EMULSION INTRAVENOUS at 09:27

## 2024-02-21 RX ADMIN — ACETAMINOPHEN 975 MG: 325 TABLET, FILM COATED ORAL at 08:46

## 2024-02-21 RX ADMIN — Medication 2 G: at 08:54

## 2024-02-21 RX ADMIN — FENTANYL CITRATE 50 MCG: 50 INJECTION INTRAMUSCULAR; INTRAVENOUS at 10:03

## 2024-02-21 ASSESSMENT — ACTIVITIES OF DAILY LIVING (ADL)
ADLS_ACUITY_SCORE: 35

## 2024-02-21 NOTE — OP NOTE
[facility] Operative Note:    PREOPERATIVE DIAGNOSIS: Abnormal uterine bleeding, cervical mass, Desires Sterilization  POSTOPERATIVE DIAGNOSIS: Same, prolapsed uterine fibroid.   PROCEDURE: Hysteroscopy, Dilatation and Curettage, Excision of prolapsed submucosal fibroid.  Laparoscopic bilateral salpingectomy  ANESTHESIA: General Endotracheal  ESTIMATED BLOOD LOSS: Minimal.   ASSISTANT:  Hoa Wang NP  (assistance required for retraction, exposure, instrument handling, and wound closure)     SPECIMENS: Fallopian tubes, Endometrial curettings. Prolapsed uterine fibroid.   OPERATIVE FINDINGS:  There was a 4cm round prolapsing mass protruding outside the cervix with a stalk leading up into the uterus through the cervix. The uterus sounded to 8 cm.   On hysteroscopy there was an otherwise normally contoured uterus with benign appearing endometrium.  On Laparoscopy there was normal pelvic and abdominal anatomy  DESCRIPTION OF PROCEDURE: Patient was brought to the operating room and uneventfully placed under general anesthesia.   She was prepped and draped in the dorsal lithotomy position and her bladder drained. A weighted speculum was placed. The protruding  cervical mass/prolapsed fibroid was grasped with a fine tooth tenaculum and then twisted off from its base (stalk leading up into uterus)  and removed.  The cervix was then  grasped anteriorly with a fine-tooth tenaculum and the uterus sounded. The cervix was gently dilated with Hegar dilators. Hysteroscopy was performed using a 30-degree rigid hysteroscope and normal saline distention media. Visualization was limited by  blood and lack of fluid distention as the fibroid had over dilated the cervix.   Findings were as described above.  The hysteroscope was removed.  Sharp curettage was performed in all 4 quadrants and endometrial curettings sent for pathology..  A uterine  manipulating device placed. We then changed gloves and proceeded to the abdominal portion  of the case.  A 5 mm infraumbilical skin incision was performed and a Veress needle introduced without difficulty. A water drop test was performed. The abdomen was insufflated with several liters of carbon dioxide. A 5 mm trocar and a laparoscope were introduced. Visualization was excellent. Findings were as described above. . Next, an 8 mm LLQ and 5mm RLQ ports were placed under direct visualization. After initial exploration, the uterus was elevated and the left mesosalpinx was transected with the Ligasure bipolar cutting cautery device.  The tube was then transected at the uterine cornua with the Ligasure.  The same procedure performed on the right fallopian tube.  The tubes were removed through the 8mm port.   At this point, there was excellent hemostasis so we proceeded to closure. The remaining trocars were removed and excess carbon dioxide expressed from the abdomen. The subcutaneous spaces were irrigated and checked for hemostasis. The skin incisions were closed with surgical glue. The uterine manipulating device was removed. There were no complications and the patient was transferred to the recovery room in excellent stable condition.   JEAN-PIERRE JACOME MD

## 2024-02-21 NOTE — OR NURSING
Patient and responsible adult given discharge instructions with no questions regarding instructions. Daisha score 19. Pain level 2/10.  Discharged from unit via wheel chair. Patient discharged to home.

## 2024-02-21 NOTE — OR NURSING
"Patient doing very well.  Notes that her pain is 3/10 and \"tolerable\" no nausea.  VSS; ready for phase 2.  "

## 2024-02-21 NOTE — ANESTHESIA CARE TRANSFER NOTE
Patient: Kayley Pak    Procedure: Procedure(s):  HYSTEROSCOPY, WITH DILATION AND CURETTAGE OF UTERUS, Submucuosal Fibriod Resection  Nexplanon Removal Left Arm  Bilateral Laparoscopic Salpingectomy       Diagnosis: Abnormal uterine bleeding (AUB) [N93.9]  Encounter for sterilization [Z30.2]  Diagnosis Additional Information: No value filed.    Anesthesia Type:   General     Note:    Oropharynx: oropharynx clear of all foreign objects and spontaneously breathing  Level of Consciousness: drowsy  Oxygen Supplementation: face mask  Level of Supplemental Oxygen (L/min / FiO2): 6  Independent Airway: airway patency satisfactory and stable  Dentition: dentition unchanged  Vital Signs Stable: post-procedure vital signs reviewed and stable  Report to RN Given: handoff report given  Patient transferred to: PACU    Handoff Report: Identifed the Patient, Identified the Reponsible Provider, Reviewed the pertinent medical history, Discussed the surgical course, Reviewed Intra-OP anesthesia mangement and issues during anesthesia, Set expectations for post-procedure period and Allowed opportunity for questions and acknowledgement of understanding    Vitals:  Vitals Value Taken Time   BP     Temp     Pulse 80 02/21/24 1043   Resp 10 02/21/24 1043   SpO2 100 % 02/21/24 1043   Vitals shown include unfiled device data.    Electronically Signed By: TYREE IBARRA CRNA  February 21, 2024  10:45 AM

## 2024-02-21 NOTE — DISCHARGE INSTRUCTIONS
Post-Anesthesia Patient Instructions    IMMEDIATELY FOLLOWING SURGERY:  Do not drive or operate machinery for the first twenty four hours after surgery.  Do not make any important decisions for twenty four hours after surgery or while taking narcotic pain medications or sedatives.  If you develop intractable nausea and vomiting or a severe headache please notify your doctor immediately.    FOLLOW-UP:  Please make an appointment with your surgeon as instructed. You do not need to follow up with anesthesia unless specifically instructed to do so.    WOUND CARE INSTRUCTIONS (if applicable):  Keep a dry clean dressing on the anesthesia/puncture wound site if there is drainage.  Once the wound has quit draining you may leave it open to air.  Generally you should leave the bandage intact for twenty four hours unless there is drainage.  If the epidural site drains for more than 36-48 hours please call the anesthesia department.    QUESTIONS?:  Please feel free to call your physician or the hospital  if you have any questions, and they will be happy to assist you.       No driving today or while on pain medications  May shower starting day after surgery.  No swim, bath soak for 2 weeks.  Pelvic rest for 2 weeks  Call Dr. Mendenhall [phone] as necessary if problems  No heavy lifting, vigorous activity,  exercise for 2 weeks  Schedule PO check Dr. Mendenhall 2 weeks.

## 2024-02-21 NOTE — ANESTHESIA PROCEDURE NOTES
Airway       Patient location during procedure: OR       Procedure Start/Stop Times: 2/21/2024 9:23 AM  Staff -        CRNA: Carmela Finney APRN CRNA       Performed By: CRNA  Consent for Airway        Urgency: elective  Indications and Patient Condition       Indications for airway management: cyndi-procedural and airway protection       Induction type:RSI       Mask difficulty assessment: 1 - vent by mask    Final Airway Details       Final airway type: endotracheal airway       Successful airway: ETT - single  Endotracheal Airway Details        ETT size (mm): 7.0       Cuffed: yes       Cuff volume (mL): 7       Successful intubation technique: video laryngoscopy       VL Blade Size: Lockett 3       Grade View of Cords: 1       Adjucts: stylet       Position: Right       Measured from: gums/teeth       Secured at (cm): 22       Bite block used: None    Post intubation assessment        Placement verified by: capnometry, equal breath sounds and chest rise        Number of attempts at approach: 1       Secured with: tape       Ease of procedure: easy       Dentition: Intact and Unchanged    Medication(s) Administered   Medication Administration Time: 2/21/2024 9:23 AM    Additional Comments       Dentition unchanged; poor dentition at baseline

## 2024-02-21 NOTE — ANESTHESIA POSTPROCEDURE EVALUATION
Patient: Kayley Pak    Procedure: Procedure(s):  HYSTEROSCOPY, WITH DILATION AND CURETTAGE OF UTERUS, Submucuosal Fibriod Resection  Nexplanon Removal Left Arm  Bilateral Laparoscopic Salpingectomy       Anesthesia Type:  General    Note:  Disposition: Outpatient   Postop Pain Control: Uneventful            Sign Out: Well controlled pain   PONV: No   Neuro/Psych: Uneventful            Sign Out: Acceptable/Baseline neuro status   Airway/Respiratory: Uneventful            Sign Out: Acceptable/Baseline resp. status   CV/Hemodynamics: Uneventful            Sign Out: Acceptable CV status; No obvious hypovolemia; No obvious fluid overload   Other NRE: NONE   DID A NON-ROUTINE EVENT OCCUR? No         Last vitals:  Vitals Value Taken Time   /69 02/21/24 1147   Temp 97.3  F (36.3  C) 02/21/24 1147   Pulse 74 02/21/24 1141   Resp 18 02/21/24 1147   SpO2 94 % 02/21/24 1147   Vitals shown include unfiled device data.    Electronically Signed By: TYREE Corey CRNA  February 21, 2024  1:54 PM

## 2024-02-21 NOTE — INTERVAL H&P NOTE
"I have reviewed the surgical (or preoperative) H&P that is linked to this encounter, and examined the patient. There are no significant changes    Clinical Conditions Present on Arrival:  Clinically Significant Risk Factors Present on Admission                  # Severe Obesity: Estimated body mass index is 49.13 kg/m  as calculated from the following:    Height as of this encounter: 1.549 m (5' 1\").    Weight as of this encounter: 117.9 kg (260 lb).       "

## 2024-02-23 LAB
PATH REPORT.COMMENTS IMP SPEC: NORMAL
PATH REPORT.COMMENTS IMP SPEC: NORMAL
PATH REPORT.FINAL DX SPEC: NORMAL
PATH REPORT.GROSS SPEC: NORMAL
PATH REPORT.MICROSCOPIC SPEC OTHER STN: NORMAL
PATH REPORT.RELEVANT HX SPEC: NORMAL
PHOTO IMAGE: NORMAL

## 2024-02-29 ENCOUNTER — TELEPHONE (OUTPATIENT)
Dept: FAMILY MEDICINE | Facility: OTHER | Age: 44
End: 2024-02-29

## 2024-02-29 NOTE — TELEPHONE ENCOUNTER
Called and no answer, sending MT letter to have pt call back to [facility] with Jono Varner

## 2024-03-01 ENCOUNTER — TELEPHONE (OUTPATIENT)
Dept: SURGERY | Facility: OTHER | Age: 44
End: 2024-03-01

## 2024-03-01 NOTE — TELEPHONE ENCOUNTER
----- Message from Nivia Gregory RN sent at 3/1/2024 10:29 AM CST -----  Regarding: patient is cancelling procedure  She is scheduled 03/15 with Dr. Rhodes.  She left message she wants to cancel due to just having surgery and feels it's too soon to do EGD/colonoscopy.  She states she will call back when she's ready to reschedule.  Thank you.

## 2024-03-08 ENCOUNTER — OFFICE VISIT (OUTPATIENT)
Dept: OBGYN | Facility: OTHER | Age: 44
End: 2024-03-08
Attending: OBSTETRICS & GYNECOLOGY
Payer: COMMERCIAL

## 2024-03-08 VITALS
SYSTOLIC BLOOD PRESSURE: 144 MMHG | OXYGEN SATURATION: 97 % | TEMPERATURE: 98.8 F | HEART RATE: 84 BPM | HEIGHT: 61 IN | BODY MASS INDEX: 49.61 KG/M2 | RESPIRATION RATE: 20 BRPM | DIASTOLIC BLOOD PRESSURE: 94 MMHG | WEIGHT: 262.8 LBS

## 2024-03-08 DIAGNOSIS — Z98.890 POST-OPERATIVE STATE: Primary | ICD-10-CM

## 2024-03-08 PROCEDURE — 99024 POSTOP FOLLOW-UP VISIT: CPT | Performed by: OBSTETRICS & GYNECOLOGY

## 2024-03-08 ASSESSMENT — PAIN SCALES - GENERAL: PAINLEVEL: NO PAIN (0)

## 2024-03-08 NOTE — PROGRESS NOTES
"SANDRA Pka is a 43 year old female presents for post operative check. She is  2  week(s) status post Laparoscopic salpingectomy D and C/hysteroscopy/fibroid excision.   She reports doing well and denies significant pain or bleeding.  Bowel and bladder function is satisfactory. Denies incisional problems. Significant findings Prolapsed fibroid, benign pathology.     O.  Blood pressure (!) 144/94, pulse 84, temperature 98.8  F (37.1  C), resp. rate 20, height 1.549 m (5' 1\"), weight 119.2 kg (262 lb 12.8 oz), SpO2 97%.    Abd: soft, non-tender, non-distended. Incision clear, dry, and intact without evidence of infection.    A. /P. Satisfactory post-op check.Released from restrictions.    F/u prn problems or at next annual examination.    Seven Mendenhall MD   "

## 2024-03-19 ENCOUNTER — OFFICE VISIT (OUTPATIENT)
Dept: FAMILY MEDICINE | Facility: OTHER | Age: 44
End: 2024-03-19
Attending: NURSE PRACTITIONER
Payer: COMMERCIAL

## 2024-03-19 ENCOUNTER — TELEPHONE (OUTPATIENT)
Dept: FAMILY MEDICINE | Facility: OTHER | Age: 44
End: 2024-03-19

## 2024-03-19 VITALS
WEIGHT: 262 LBS | BODY MASS INDEX: 49.47 KG/M2 | TEMPERATURE: 98.3 F | OXYGEN SATURATION: 98 % | SYSTOLIC BLOOD PRESSURE: 138 MMHG | DIASTOLIC BLOOD PRESSURE: 90 MMHG | HEIGHT: 61 IN | HEART RATE: 82 BPM

## 2024-03-19 DIAGNOSIS — I10 ESSENTIAL HYPERTENSION: Primary | ICD-10-CM

## 2024-03-19 DIAGNOSIS — Z72.0 TOBACCO ABUSE: ICD-10-CM

## 2024-03-19 DIAGNOSIS — H60.392 INFECTIVE OTITIS EXTERNA, LEFT: ICD-10-CM

## 2024-03-19 DIAGNOSIS — H60.12 CELLULITIS OF AURICLE OF LEFT EAR: ICD-10-CM

## 2024-03-19 PROCEDURE — 99214 OFFICE O/P EST MOD 30 MIN: CPT | Performed by: NURSE PRACTITIONER

## 2024-03-19 RX ORDER — CEPHALEXIN 500 MG/1
500 CAPSULE ORAL 4 TIMES DAILY
Qty: 40 CAPSULE | Refills: 0 | Status: SHIPPED | OUTPATIENT
Start: 2024-03-19 | End: 2024-03-29

## 2024-03-19 RX ORDER — OFLOXACIN 3 MG/ML
10 SOLUTION AURICULAR (OTIC) DAILY
Qty: 10 ML | Refills: 0 | Status: SHIPPED | OUTPATIENT
Start: 2024-03-19 | End: 2024-03-26

## 2024-03-19 ASSESSMENT — PAIN SCALES - GENERAL: PAINLEVEL: MODERATE PAIN (5)

## 2024-03-19 NOTE — PROGRESS NOTES
"  Assessment & Plan     (I10) Essential hypertension  (primary encounter diagnosis)  Comment: BP high today at 138/90  Plan: Patient plans to make a follow-up with her PCP to address.     (Z72.0) Tobacco abuse  Plan: Cessation encouraged.     (H60.392) Infective otitis externa, left  Comment: left ear canal erythematous and boggy  Plan: ofloxacin (FLOXIN) 0.3 % otic solution        Will treat with ear drops.     (H60.12) Cellulitis of auricle of left ear  Comment: left ear lobe swollen with mild erythema, no drainage  Plan: cephALEXin (KEFLEX) 500 MG capsule        Allergy to Bactrim. Will treat with Keflex and see her back early next week to recheck. Sooner with new or worsening symptoms. She does agrees to go to the ER if her symptoms worsen.       BMI  Estimated body mass index is 49.5 kg/m  as calculated from the following:    Height as of this encounter: 1.549 m (5' 1\").    Weight as of this encounter: 118.8 kg (262 lb).   Weight management plan: Discussed healthy diet and exercise guidelines      Christine Zaldivar is a 43 year old, presenting for the following health issues:  Ear Problem    HPI     Acute Illness  Acute illness concerns: left ear canal feels swollen; ear is also very painful   Onset/Duration: 2 days ago   Symptoms:  Fever: No  Chills/Sweats: No  Headache (location?): YES  Sinus Pressure: No  Conjunctivitis:  No  Ear Pain: YES: left  Rhinorrhea: YES  Congestion: No  Sore Throat: YES- a little bit   Cough: no  Wheeze: No  Decreased Appetite: No  Nausea: No  Vomiting: No  Diarrhea: No  Dysuria/Freq.: No  Dysuria or Hematuria: No  Fatigue/Achiness: No  Sick/Strep Exposure: No  Therapies tried and outcome: None  She does have seasonal allergies. Feels like they are worsening.   She does smoke.         Review of Systems  Constitutional, HEENT, cardiovascular, pulmonary, gi and gu systems are negative, except as otherwise noted.      Objective    BP (!) 138/90 (BP Location: Left arm, Patient " "Position: Sitting, Cuff Size: Adult Large)   Pulse 82   Temp 98.3  F (36.8  C) (Tympanic)   Ht 1.549 m (5' 1\")   Wt 118.8 kg (262 lb)   SpO2 98%   BMI 49.50 kg/m    Body mass index is 49.5 kg/m .  Physical Exam   GENERAL: alert and no distress  EYES: Eyes grossly normal to inspection, PERRL and conjunctivae and sclerae normal  HENT: right ear canal and TM normal, left ear canal with mild erythema and some bogginess, TM is normal without erythema, also has pain with lifting of left ear pinna, her left ear lobe is also mildly erythematous with some swelling, nose and mouth without ulcers or lesions  NECK: no adenopathy, no asymmetry, masses, or scars  RESP: lungs clear to auscultation - no rales, rhonchi or wheezes  CV: regular rate and rhythm, normal S1 S2, no S3 or S4, no murmur, click or rub, no peripheral edema  ABDOMEN: soft, nontender, no hepatosplenomegaly, no masses and bowel sounds normal  MS: no gross musculoskeletal defects noted, no edema  NEURO: Normal strength and tone, mentation intact and speech normal  PSYCH: mentation appears normal, affect normal/bright          Signed Electronically by: Gena Ponce NP    "

## 2024-03-19 NOTE — TELEPHONE ENCOUNTER
8:11 AM    Reason for Call: OVERBOOK    Patient is having the following symptoms: L ear swollen for 2 days. Barely hear out of ear    The patient is requesting an appointment for today with KEN Lund or any PCP in [place].    Was an appointment offered for this call? No  If yes : Appointment type              Date    Preferred method for responding to this message: Telephone Call  What is your phone number ? [phone] work phone    If we cannot reach you directly, may we leave a detailed response at the number you provided? Yes    Can this message wait until your PCP/provider returns, if unavailable today? Taniya Marcano

## 2024-03-20 NOTE — PROGRESS NOTES
"  Assessment & Plan     (H60.392) Infective otitis externa, left  (primary encounter diagnosis)  Plan: Symptoms have resolved. Will return with new or worsening symptoms.     (H60.12) Cellulitis of auricle of left ear  Plan: Symptoms have resolved. Will return with new or worsening symptoms.    (J30.2) Seasonal allergic rhinitis, unspecified trigger  Comment: having nasal congestion and watery eyes, she feels her allergies are flaring  Plan: cetirizine (ZYRTEC) 10 MG tablet        Will start a daily Zyrtec. She will return if symptoms do not improve.     (I10) Essential hypertension  Comment: /88  Plan: Borderline high. Will follow-up with PCP to recheck.         Christine Zaldivar is a 43 year old, presenting for the following health issues:  Otalgia    HPI     Follow-up Left Ear Swelling  Patient was seen on 3/19/24, left ear lobe was swollen. Her canal was also erythematous and boggy. Antibiotic drop and oral Keflex was ordered.     Today she notes that her ear pain and swelling have resolved. No fevers. No cough. Some nasal congestion, but she notes that her seasonal allergies are flaring. No chest pain or shortness of breath.     She does smoke.       Review of Systems  Constitutional, HEENT, cardiovascular, pulmonary, gi and gu systems are negative, except as otherwise noted.      Objective    /88 (BP Location: Left arm, Patient Position: Sitting, Cuff Size: Adult Large)   Pulse 83   Temp 98.5  F (36.9  C) (Tympanic)   Ht 1.549 m (5' 1\")   Wt 118.8 kg (262 lb)   SpO2 96%   BMI 49.50 kg/m    Body mass index is 49.5 kg/m .  Physical Exam   GENERAL: alert and no distress, obese  EYES: Eyes grossly normal to inspection, PERRL and conjunctivae and sclerae normal  HENT: ear canals and TM's normal, nose and mouth without ulcers or lesions  NECK: no adenopathy, no asymmetry, masses, or scars  RESP: lungs clear to auscultation - no rales, rhonchi or wheezes  CV: regular rate and rhythm, no murmur, " click or rub, no peripheral edema  NEURO: Normal strength and tone, mentation intact and speech normal  PSYCH: mentation appears normal, affect normal/bright            Signed Electronically by: Gena Ponce, NP

## 2024-03-25 ENCOUNTER — OFFICE VISIT (OUTPATIENT)
Dept: FAMILY MEDICINE | Facility: OTHER | Age: 44
End: 2024-03-25
Attending: NURSE PRACTITIONER
Payer: COMMERCIAL

## 2024-03-25 VITALS
HEART RATE: 83 BPM | SYSTOLIC BLOOD PRESSURE: 138 MMHG | TEMPERATURE: 98.5 F | WEIGHT: 262 LBS | OXYGEN SATURATION: 96 % | BODY MASS INDEX: 49.47 KG/M2 | DIASTOLIC BLOOD PRESSURE: 88 MMHG | HEIGHT: 61 IN

## 2024-03-25 DIAGNOSIS — J30.2 SEASONAL ALLERGIC RHINITIS, UNSPECIFIED TRIGGER: ICD-10-CM

## 2024-03-25 DIAGNOSIS — I10 ESSENTIAL HYPERTENSION: ICD-10-CM

## 2024-03-25 DIAGNOSIS — H60.392 INFECTIVE OTITIS EXTERNA, LEFT: Primary | ICD-10-CM

## 2024-03-25 DIAGNOSIS — H60.12 CELLULITIS OF AURICLE OF LEFT EAR: ICD-10-CM

## 2024-03-25 PROCEDURE — 99213 OFFICE O/P EST LOW 20 MIN: CPT | Performed by: NURSE PRACTITIONER

## 2024-03-25 RX ORDER — CETIRIZINE HYDROCHLORIDE 10 MG/1
10 TABLET ORAL DAILY
Qty: 90 TABLET | Refills: 1 | Status: SHIPPED | OUTPATIENT
Start: 2024-03-25

## 2024-03-25 ASSESSMENT — PAIN SCALES - GENERAL: PAINLEVEL: NO PAIN (0)

## 2024-04-10 ENCOUNTER — OFFICE VISIT (OUTPATIENT)
Dept: FAMILY MEDICINE | Facility: OTHER | Age: 44
End: 2024-04-10
Attending: NURSE PRACTITIONER
Payer: COMMERCIAL

## 2024-04-10 VITALS
DIASTOLIC BLOOD PRESSURE: 78 MMHG | HEART RATE: 91 BPM | WEIGHT: 266.8 LBS | BODY MASS INDEX: 50.37 KG/M2 | TEMPERATURE: 98.2 F | HEIGHT: 61 IN | OXYGEN SATURATION: 97 % | SYSTOLIC BLOOD PRESSURE: 138 MMHG

## 2024-04-10 DIAGNOSIS — I10 ESSENTIAL HYPERTENSION: Primary | ICD-10-CM

## 2024-04-10 PROCEDURE — 99213 OFFICE O/P EST LOW 20 MIN: CPT | Performed by: NURSE PRACTITIONER

## 2024-04-10 ASSESSMENT — PAIN SCALES - GENERAL: PAINLEVEL: MILD PAIN (2)

## 2024-04-10 NOTE — PROGRESS NOTES
"  Assessment & Plan     (I10) Essential hypertension  (primary encounter diagnosis)  Comment: BP controlled. Continue medication regimen. No added salt to diet. Discussed heart healthy diet and increasing exercise   Plan: Follow up with any increase in symptoms or concerns       See Patient Instructions    Return if symptoms worsen or fail to improve.    Christine Zaldivar is a 43 year old, presenting for the following health issues:  Hypertension and Recheck Medication        4/10/2024     8:04 AM   Additional Questions   Roomed by Hallie VAUGHN     [facility]     Hypertension Follow-up  Do you check your blood pressure regularly outside of the clinic? No   Are you following a low salt diet? Yes  Are your blood pressures ever more than 140 on the top number (systolic) OR more   than 90 on the bottom number (diastolic), for example 140/90? N/A  Denies chest pain, dizziness, or SOB  She has been feeling good    Medication Followup of losartan  Taking Medication as prescribed: yes  Side Effects:  None  Medication Helping Symptoms:  not applicable      Review of Systems  Constitutional, HEENT, cardiovascular, pulmonary, gi and gu systems are negative, except as otherwise noted.      Objective    /78 (BP Location: Right arm, Patient Position: Sitting, Cuff Size: Adult Large)   Pulse 91   Temp 98.2  F (36.8  C) (Tympanic)   Ht 1.549 m (5' 1\")   Wt 121 kg (266 lb 12.8 oz)   SpO2 97%   BMI 50.41 kg/m    Body mass index is 50.41 kg/m .  Physical Exam   GENERAL: alert and no distress  RESP: lungs clear to auscultation - no rales, rhonchi or wheezes  CV: regular rate and rhythm, normal S1 S2, no S3 or S4, no murmur, click or rub, no peripheral edema  SKIN: no suspicious lesions or rashes  PSYCH: mentation appears normal, affect normal/bright        Signed Electronically by: TYREE Neville CNP    "

## 2024-05-14 DIAGNOSIS — I10 ESSENTIAL HYPERTENSION: ICD-10-CM

## 2024-05-14 RX ORDER — LOSARTAN POTASSIUM 100 MG/1
100 TABLET ORAL DAILY
Qty: 90 TABLET | Refills: 2 | Status: SHIPPED | OUTPATIENT
Start: 2024-05-14

## 2024-05-14 NOTE — TELEPHONE ENCOUNTER
Losartan       Last Written Prescription Date:  2/09/2024  Last Fill Quantity: 90,   # refills: 0  Last Office Visit: 4/10/2024  Future Office visit:

## 2024-05-28 ENCOUNTER — HOSPITAL ENCOUNTER (EMERGENCY)
Facility: HOSPITAL | Age: 44
Discharge: HOME | End: 2024-05-28
Payer: COMMERCIAL

## 2024-05-28 VITALS
DIASTOLIC BLOOD PRESSURE: 108 MMHG | HEIGHT: 61 IN | HEART RATE: 91 BPM | TEMPERATURE: 99 F | SYSTOLIC BLOOD PRESSURE: 168 MMHG | RESPIRATION RATE: 18 BRPM | BODY MASS INDEX: 50.03 KG/M2 | OXYGEN SATURATION: 96 % | WEIGHT: 265 LBS

## 2024-05-28 DIAGNOSIS — K04.7 DENTAL ABSCESS: ICD-10-CM

## 2024-05-28 PROCEDURE — 96372 THER/PROPH/DIAG INJ SC/IM: CPT

## 2024-05-28 PROCEDURE — 99213 OFFICE O/P EST LOW 20 MIN: CPT

## 2024-05-28 PROCEDURE — 250N000011 HC RX IP 250 OP 636

## 2024-05-28 PROCEDURE — G0463 HOSPITAL OUTPT CLINIC VISIT: HCPCS | Mod: 25

## 2024-05-28 RX ORDER — CEFDINIR 300 MG/1
300 CAPSULE ORAL 2 TIMES DAILY
Qty: 20 CAPSULE | Refills: 0 | Status: SHIPPED | OUTPATIENT
Start: 2024-05-28 | End: 2024-06-07

## 2024-05-28 RX ORDER — CHLORHEXIDINE GLUCONATE ORAL RINSE 1.2 MG/ML
15 SOLUTION DENTAL 2 TIMES DAILY
Qty: 473 ML | Refills: 0 | Status: SHIPPED | OUTPATIENT
Start: 2024-05-28 | End: 2024-06-07

## 2024-05-28 RX ORDER — METRONIDAZOLE 500 MG/1
500 TABLET ORAL 3 TIMES DAILY
Qty: 30 TABLET | Refills: 0 | Status: SHIPPED | OUTPATIENT
Start: 2024-05-28 | End: 2024-06-07

## 2024-05-28 RX ORDER — SACCHAROMYCES BOULARDII 250 MG
250 CAPSULE ORAL 2 TIMES DAILY
Qty: 28 CAPSULE | Refills: 0 | Status: SHIPPED | OUTPATIENT
Start: 2024-05-28 | End: 2024-06-11

## 2024-05-28 RX ORDER — CEFTRIAXONE SODIUM 1 G
2 VIAL (EA) INJECTION ONCE
Status: COMPLETED | OUTPATIENT
Start: 2024-05-28 | End: 2024-05-28

## 2024-05-28 RX ADMIN — CEFTRIAXONE 1 G: 1 INJECTION, POWDER, FOR SOLUTION INTRAMUSCULAR; INTRAVENOUS at 11:43

## 2024-05-28 ASSESSMENT — COLUMBIA-SUICIDE SEVERITY RATING SCALE - C-SSRS
2. HAVE YOU ACTUALLY HAD ANY THOUGHTS OF KILLING YOURSELF IN THE PAST MONTH?: NO
1. IN THE PAST MONTH, HAVE YOU WISHED YOU WERE DEAD OR WISHED YOU COULD GO TO SLEEP AND NOT WAKE UP?: NO
6. HAVE YOU EVER DONE ANYTHING, STARTED TO DO ANYTHING, OR PREPARED TO DO ANYTHING TO END YOUR LIFE?: NO

## 2024-05-28 ASSESSMENT — ENCOUNTER SYMPTOMS
FACIAL SWELLING: 1
FEVER: 0
CHILLS: 0
SORE THROAT: 0
DIARRHEA: 0
TROUBLE SWALLOWING: 0
APPETITE CHANGE: 0
ACTIVITY CHANGE: 0
ABDOMINAL PAIN: 0
VOMITING: 0
NAUSEA: 0

## 2024-05-28 ASSESSMENT — ACTIVITIES OF DAILY LIVING (ADL): ADLS_ACUITY_SCORE: 35

## 2024-05-28 NOTE — DISCHARGE INSTRUCTIONS
Follow up with dentist as soon as possible.   You were prescribed 2 antibiotics. Cefdinir (2 times a day) and metronidazole (3 times a day). Probiotic and swish and spit mouth wash also prescribed. Avoid alcohol while taking antibiotics. Tylenol and ibuprofen as needed.   Return with any concerns. Follow up in the clinic as needed.

## 2024-05-28 NOTE — Clinical Note
Kayley Pak was seen and treated in our emergency department on 5/28/2024.  She may return to work on 05/30/2024.       If you have any questions or concerns, please don't hesitate to call.      Ailyn Kelsey, NP

## 2024-05-28 NOTE — ED PROVIDER NOTES
History     Chief Complaint   Patient presents with    Dental Pain     HPI  Kayley Pak is a 43 year old female who presents to the urgent care with a 4 day history of right upper dental pain with right sided facial swelling. She also notes a foul taste in mouth. She denies fevers, abd pain, n/v/d, swallowing difficulty, and difficulty opening mouth. No recent abx. Ibuprofen PTA with mild reduction in pain. Has not seen a dentist.     Allergies:  Allergies   Allergen Reactions    Amoxicillin     Sulfa Antibiotics        Problem List:    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     Priority: Medium    Dyslipidemia 08/07/2012     Priority: Medium     Low HDL, high triglycerides      Seasonal allergic rhinitis 08/04/2010     Priority: Medium    Family history of diabetes mellitus      Priority: Medium        Past Medical History:    Past Medical History:   Diagnosis Date    Abnormal uterine bleeding (AUB) 10/3/2022    Anxiety     Atypical nevus 12/22/2017    Dyslipidemia 8/7/2012    Essential hypertension 12/22/2017    LISA (generalized anxiety disorder) 12/22/2017    Herpes simplex type 2 infection 201212    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    Hypertension     Morbid obesity (H) 11/12/2020    Obesity     Obesity, morbid, BMI 50 or higher (H) 11/12/2020    Panic attack 12/22/2017    Pes anserine bursitis 08/25/2015    Psoriasis     Seasonal allergic  rhinitis 8/4/2010    Sleep apnea, unspecified type 10/20/2017    TMJ (dislocation of temporomandibular joint) 09/2009    Tobacco abuse 6/21/2021       Past Surgical History:    Past Surgical History:   Procedure Laterality Date    DILATION AND CURETTAGE, OPERATIVE HYSTEROSCOPY, COMBINED N/A 2/21/2024    Procedure: HYSTEROSCOPY, WITH DILATION AND CURETTAGE OF UTERUS, Submucuosal Fibriod Resection;  Surgeon: Seven Mendenhall MD;  Location: [facility]    EXPLANT HORMONE Left 2/21/2024    Procedure: Nexplanon Removal Left Arm;  Surgeon: Seven Mendenhall MD;  Location: [facility]    IR RENAL STONE REMOVAL RIGHT  2010    LAPAROSCOPIC SALPINGECTOMY Bilateral 2/21/2024    Procedure: Bilateral Laparoscopic Salpingectomy;  Surgeon: Seven Mendenhall MD;  Location: [facility]    miscarriage  1999    Age 19 D & C.    mole removed  2019    Removed in between toes on right foot betwwen 2nd and 3rd toe    TONSILLECTOMY, ADENOIDECTOMY, COMBINED  1993       Family History:    Family History   Problem Relation Age of Onset    Migraines Mother     Hypertension Mother     Alcoholism Father     Mental Illness Father     Lupus Paternal Aunt     Hypertension Maternal Grandmother     Factor V Leiden deficiency Maternal Grandfather     Diabetes Type 2  Maternal Grandfather     Hypertension Paternal Grandmother     Cancer - colorectal Paternal Grandmother     Cancer Paternal Grandfather         stomach    Lupus Paternal Grandfather     Factor V Leiden deficiency Maternal Uncle     Heart Disease Maternal Uncle         Multiple heart attacks    Factor V Leiden deficiency Maternal Aunt     Breast Cancer Maternal Aunt 45    Factor V Leiden deficiency Maternal Uncle     Breast Cancer Cousin 38    Lung Cancer Cousin         also foot cancer       Social History:  Marital Status:  Single [1]  Social History     Tobacco Use    Smoking status: Every Day     Current packs/day: 0.50     Average packs/day: 0.5 packs/day for 28.0 years (14.0 ttl pk-yrs)     Types: Cigarettes  "    Start date: 8/21/1996    Smokeless tobacco: Never    Tobacco comments:     has patches going to start soon    Vaping Use    Vaping status: Never Used   Substance Use Topics    Alcohol use: Yes     Comment: once a month    Drug use: No        Medications:    cefdinir (OMNICEF) 300 MG capsule  cetirizine (ZYRTEC) 10 MG tablet  chlorhexidine (PERIDEX) 0.12 % solution  DULoxetine (CYMBALTA) 30 MG capsule  hydrOXYzine (ATARAX) 25 MG tablet  losartan (COZAAR) 100 MG tablet  metroNIDAZOLE (FLAGYL) 500 MG tablet  omeprazole (PRILOSEC) 20 MG DR capsule  propranolol ER (INDERAL LA) 160 MG 24 hr capsule  saccharomyces boulardii (FLORASTOR) 250 MG capsule          Review of Systems   Constitutional:  Negative for activity change, appetite change, chills and fever.   HENT:  Positive for dental problem and facial swelling. Negative for congestion, ear pain, sore throat and trouble swallowing.    Gastrointestinal:  Negative for abdominal pain, diarrhea, nausea and vomiting.   All other systems reviewed and are negative.      Physical Exam   BP: (!) 183/130  Pulse: 91  Temp: 99  F (37.2  C)  Resp: 18  Height: 154.9 cm (5' 1\")  Weight: 120.2 kg (265 lb)  SpO2: 96 %      Physical Exam  Vitals and nursing note reviewed.   Constitutional:       General: She is not in acute distress.     Appearance: Normal appearance. She is not ill-appearing, toxic-appearing or diaphoretic.   HENT:      Head:      Jaw: Swelling present. No trismus.        Right Ear: Tympanic membrane is not erythematous.      Left Ear: Tympanic membrane is not erythematous.      Mouth/Throat:      Lips: Pink. No lesions.      Mouth: Mucous membranes are moist.      Dentition: Abnormal dentition. Dental tenderness, gingival swelling, dental caries, dental abscesses and gum lesions present.      Tongue: No lesions. Tongue does not deviate from midline.      Palate: No mass and lesions.      Pharynx: Oropharynx is clear.     Cardiovascular:      Rate and Rhythm: " Normal rate and regular rhythm.      Pulses: Normal pulses.      Heart sounds: Normal heart sounds. No murmur heard.  Pulmonary:      Effort: Pulmonary effort is normal.      Breath sounds: Normal breath sounds. No wheezing, rhonchi or rales.   Neurological:      Mental Status: She is alert.         ED Course        Procedures       No results found for this or any previous visit (from the past 24 hour(s)).    Medications   cefTRIAXone (ROCEPHIN) in lidocaine 1% (PF) for IM administration 2 g (1 g Intramuscular $Given 5/28/24 1143)       Assessments & Plan (with Medical Decision Making)     I have reviewed the nursing notes.    I have reviewed the findings, diagnosis, plan and need for follow up with the patient.  Kayley Pak is a 43 year old female who presents to the urgent care with a 4 day history of right upper dental pain with right sided facial swelling. She also notes a foul taste in mouth. She denies fevers, abd pain, n/v/d, swallowing difficulty, and difficulty opening mouth. No recent abx. Ibuprofen PTA with mild reduction in pain. Has not seen a dentist.     MDM: vital signs normal, afebrile. Non toxic in appearance with no noted distress. Lungs clear, heart tones regular. Tenderness with visibly draining abscess to tooth #5. Facial swelling noted. No trismus or drooling. She has not contacted a dentist. Discussed opening abscess to allow for increased drainage. She declines at this time. 2g of IM rocephin given in UC. Declines Toradol injection. Cefdinir, flagyl, probiotics, and peridex swish and spit prescribed. Strict return precautions and supportive measures discussed. She is in agreement with plan.     (K04.7) Dental abscess  Plan: Follow up with dentist as soon as possible.   You were prescribed 2 antibiotics. Cefdinir (2 times a day) and metronidazole (3 times a day). Probiotic and swish and spit mouth wash also prescribed. Avoid alcohol while taking antibiotics. Tylenol and ibuprofen as  needed.   Return with any concerns. Follow up in the clinic as needed. Understanding verbalized.       New Prescriptions    CEFDINIR (OMNICEF) 300 MG CAPSULE    Take 1 capsule (300 mg) by mouth 2 times daily for 10 days    CHLORHEXIDINE (PERIDEX) 0.12 % SOLUTION    Swish and spit 15 mLs in mouth 2 times daily for 10 days    METRONIDAZOLE (FLAGYL) 500 MG TABLET    Take 1 tablet (500 mg) by mouth 3 times daily for 10 days    SACCHAROMYCES BOULARDII (FLORASTOR) 250 MG CAPSULE    Take 1 capsule (250 mg) by mouth 2 times daily for 14 days       Final diagnoses:   Dental abscess       5/28/2024   [facility]       Ailyn Kelsey NP  05/28/24 1154

## 2024-05-28 NOTE — ED TRIAGE NOTES
Patient presents to urgent care for right side dental pain that started on Saturday that is currently 5/10 for pain. Pain has gotten worse the last few days. Patient currently has insurance but no one is accepting new patient's. Patient has been given a dental list and a dental workshop sheet. Patient has tried tea bags, ibuprofen, Ambusol. Medical mouthwash, salt and water and nothing is helping.

## 2024-05-30 ENCOUNTER — APPOINTMENT (OUTPATIENT)
Dept: GENERAL RADIOLOGY | Facility: HOSPITAL | Age: 44
End: 2024-05-30
Attending: NURSE PRACTITIONER
Payer: COMMERCIAL

## 2024-05-30 ENCOUNTER — HOSPITAL ENCOUNTER (EMERGENCY)
Facility: HOSPITAL | Age: 44
Discharge: HOME | End: 2024-05-30
Attending: NURSE PRACTITIONER | Admitting: NURSE PRACTITIONER
Payer: COMMERCIAL

## 2024-05-30 VITALS
DIASTOLIC BLOOD PRESSURE: 106 MMHG | SYSTOLIC BLOOD PRESSURE: 162 MMHG | TEMPERATURE: 98.4 F | RESPIRATION RATE: 22 BRPM | HEIGHT: 61 IN | HEART RATE: 78 BPM | WEIGHT: 265.1 LBS | OXYGEN SATURATION: 96 % | BODY MASS INDEX: 50.05 KG/M2

## 2024-05-30 DIAGNOSIS — J06.9 VIRAL URI WITH COUGH: ICD-10-CM

## 2024-05-30 DIAGNOSIS — R06.2 WHEEZING: ICD-10-CM

## 2024-05-30 PROCEDURE — 94640 AIRWAY INHALATION TREATMENT: CPT

## 2024-05-30 PROCEDURE — 99213 OFFICE O/P EST LOW 20 MIN: CPT | Performed by: NURSE PRACTITIONER

## 2024-05-30 PROCEDURE — G0463 HOSPITAL OUTPT CLINIC VISIT: HCPCS | Mod: 25

## 2024-05-30 PROCEDURE — 71046 X-RAY EXAM CHEST 2 VIEWS: CPT

## 2024-05-30 PROCEDURE — 250N000009 HC RX 250: Performed by: NURSE PRACTITIONER

## 2024-05-30 RX ORDER — MONTELUKAST SODIUM 10 MG/1
10 TABLET ORAL AT BEDTIME
Qty: 30 TABLET | Refills: 0 | Status: SHIPPED | OUTPATIENT
Start: 2024-05-30

## 2024-05-30 RX ORDER — IPRATROPIUM BROMIDE AND ALBUTEROL SULFATE 2.5; .5 MG/3ML; MG/3ML
3 SOLUTION RESPIRATORY (INHALATION) ONCE
Status: COMPLETED | OUTPATIENT
Start: 2024-05-30 | End: 2024-05-30

## 2024-05-30 RX ORDER — PREDNISONE 10 MG/1
30 TABLET ORAL DAILY
Qty: 15 TABLET | Refills: 0 | Status: SHIPPED | OUTPATIENT
Start: 2024-05-30 | End: 2024-06-04

## 2024-05-30 RX ADMIN — IPRATROPIUM BROMIDE AND ALBUTEROL SULFATE 3 ML: .5; 3 SOLUTION RESPIRATORY (INHALATION) at 13:35

## 2024-05-30 ASSESSMENT — ENCOUNTER SYMPTOMS
ARTHRALGIAS: 1
FATIGUE: 1
HEADACHES: 1
WHEEZING: 1
APPETITE CHANGE: 1
SINUS PRESSURE: 0
SPEECH DIFFICULTY: 0
DIZZINESS: 0
MYALGIAS: 1
SINUS PAIN: 0
CHEST TIGHTNESS: 1
LIGHT-HEADEDNESS: 0
TROUBLE SWALLOWING: 0
FEVER: 0
SHORTNESS OF BREATH: 1
SORE THROAT: 1

## 2024-05-30 ASSESSMENT — ACTIVITIES OF DAILY LIVING (ADL): ADLS_ACUITY_SCORE: 35

## 2024-05-30 NOTE — ED NOTES
Assessed by provider in triage and deemed appropriate for Urgent Care.  
PAST SURGICAL HISTORY:  No significant past surgical history

## 2024-05-30 NOTE — ED PROVIDER NOTES
History     Chief Complaint   Patient presents with    Cough    Shortness of Breath     HPI  Kayley Pak is a 43 year old female who presents today with a CC of cough and shortness of breath x 2 days.  She can hear audible wheezes and her chest feels tight.      She has a history of reactive airway disease, she was on advair in the distant past.  Reports she hasn't been on inhalers for 10 years or so.   She does have seasonal allergies that she takes Claritin for.      No known exposures to ill contacts but was in the ED 2 days ago for dental pain.  She is currently on abx for dental infection.       She declines Mult[facility] testing     Allergies:  Allergies   Allergen Reactions    Amoxicillin     Sulfa Antibiotics        Problem List:    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     Priority: Medium    Dyslipidemia 08/07/2012     Priority: Medium     Low HDL, high triglycerides      Seasonal allergic rhinitis 08/04/2010     Priority: Medium    Family history of diabetes mellitus      Priority: Medium        Past Medical History:    Past Medical History:   Diagnosis Date    Abnormal uterine bleeding (AUB) 10/3/2022    Anxiety     Atypical nevus 12/22/2017    Dyslipidemia 8/7/2012    Essential hypertension 12/22/2017    LISA (generalized anxiety disorder) 12/22/2017    Herpes simplex type 2 infection 201212    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     Hypertension     Morbid obesity (H) 11/12/2020    Obesity     Obesity, morbid, BMI 50 or higher (H) 11/12/2020    Panic attack 12/22/2017    Pes anserine bursitis 08/25/2015    Psoriasis     Seasonal allergic rhinitis 8/4/2010    Sleep apnea, unspecified type 10/20/2017    TMJ (dislocation of temporomandibular joint) 09/2009    Tobacco abuse 6/21/2021       Past Surgical History:    Past Surgical History:   Procedure Laterality Date    DILATION AND CURETTAGE, OPERATIVE HYSTEROSCOPY, COMBINED N/A 2/21/2024    Procedure: HYSTEROSCOPY, WITH DILATION AND CURETTAGE OF UTERUS, Submucuosal Fibriod Resection;  Surgeon: Seven Mendenhall MD;  Location: [facility]    EXPLANT HORMONE Left 2/21/2024    Procedure: Nexplanon Removal Left Arm;  Surgeon: Seven Mendenhall MD;  Location: [facility]    IR RENAL STONE REMOVAL RIGHT  2010    LAPAROSCOPIC SALPINGECTOMY Bilateral 2/21/2024    Procedure: Bilateral Laparoscopic Salpingectomy;  Surgeon: Seven Mendenhall MD;  Location: [facility]    miscarriage  1999    Age 19 D & C.    mole removed  2019    Removed in between toes on right foot betwwen 2nd and 3rd toe    TONSILLECTOMY, ADENOIDECTOMY, COMBINED  1993       Family History:    Family History   Problem Relation Age of Onset    Migraines Mother     Hypertension Mother     Alcoholism Father     Mental Illness Father     Lupus Paternal Aunt     Hypertension Maternal Grandmother     Factor V Leiden deficiency Maternal Grandfather     Diabetes Type 2  Maternal Grandfather     Hypertension Paternal Grandmother     Cancer - colorectal Paternal Grandmother     Cancer Paternal Grandfather         stomach    Lupus Paternal Grandfather     Factor V Leiden deficiency Maternal Uncle     Heart Disease Maternal Uncle         Multiple heart attacks    Factor V Leiden deficiency Maternal Aunt     Breast Cancer Maternal Aunt 45    Factor V Leiden deficiency Maternal Uncle     Breast Cancer Cousin 38    Lung Cancer Cousin         also foot cancer       Social  "History:  Marital Status:  Single [1]  Social History     Tobacco Use    Smoking status: Every Day     Current packs/day: 0.50     Average packs/day: 0.5 packs/day for 28.0 years (14.0 ttl pk-yrs)     Types: Cigarettes     Start date: 8/21/1996    Smokeless tobacco: Never    Tobacco comments:     has patches going to start soon    Vaping Use    Vaping status: Never Used   Substance Use Topics    Alcohol use: Yes     Comment: once a month    Drug use: No        Medications:    ipratropium-albuterol (COMBIVENT RESPIMAT)  MCG/ACT inhaler  montelukast (SINGULAIR) 10 MG tablet  predniSONE (DELTASONE) 10 MG tablet  cefdinir (OMNICEF) 300 MG capsule  cetirizine (ZYRTEC) 10 MG tablet  chlorhexidine (PERIDEX) 0.12 % solution  DULoxetine (CYMBALTA) 30 MG capsule  hydrOXYzine (ATARAX) 25 MG tablet  losartan (COZAAR) 100 MG tablet  metroNIDAZOLE (FLAGYL) 500 MG tablet  omeprazole (PRILOSEC) 20 MG DR capsule  propranolol ER (INDERAL LA) 160 MG 24 hr capsule  saccharomyces boulardii (FLORASTOR) 250 MG capsule          Review of Systems   Constitutional:  Positive for appetite change and fatigue. Negative for fever.   HENT:  Positive for postnasal drip and sore throat (raw from coughing). Negative for congestion, sinus pressure, sinus pain and trouble swallowing.    Respiratory:  Positive for chest tightness, shortness of breath and wheezing.    Cardiovascular:  Negative for chest pain.   Musculoskeletal:  Positive for arthralgias and myalgias.   Skin:  Negative for rash.   Neurological:  Positive for headaches. Negative for dizziness, speech difficulty and light-headedness.       Physical Exam   BP: (!) 179/119 (automatic.)  Pulse: 78  Temp: 98.4  F (36.9  C)  Resp: 22  Height: 154.9 cm (5' 1\")  Weight: 120.2 kg (265 lb 1.6 oz)  SpO2: 95 %      Physical Exam  Vitals and nursing note reviewed.   Constitutional:       Appearance: She is well-developed. She is ill-appearing. She is not toxic-appearing.   HENT:      Head: " Normocephalic and atraumatic.   Neck:      Trachea: No tracheal deviation.   Cardiovascular:      Rate and Rhythm: Normal rate and regular rhythm.   Pulmonary:      Breath sounds: Examination of the right-upper field reveals wheezing. Examination of the left-upper field reveals wheezing. Examination of the right-middle field reveals wheezing. Examination of the left-middle field reveals wheezing. Examination of the right-lower field reveals wheezing. Examination of the left-lower field reveals wheezing. Wheezing (inspiratory and expiratory) present. No rhonchi or rales.      Comments: Congested sounding cough  Musculoskeletal:         General: Normal range of motion.      Cervical back: Normal range of motion and neck supple.   Lymphadenopathy:      Cervical: No cervical adenopathy.   Skin:     General: Skin is warm and dry.   Neurological:      General: No focal deficit present.      Mental Status: She is alert and oriented to person, place, and time.         ED Course     Results for orders placed or performed during the hospital encounter of 05/30/24 (from the past 24 hour(s))   Chest XR,  PA & LAT    Narrative    PROCEDURE:  XR CHEST 2 VIEWS    HISTORY:  cough, chest congestion.     COMPARISON:  2022    FINDINGS:   The cardiac silhouette is normal in size. The pulmonary vasculature is  normal.  The lungs are clear. No pleural effusion or pneumothorax.      Impression    IMPRESSION:  No acute cardiopulmonary disease.      DAVID RECINOS MD         SYSTEM ID:  [id number]       Medications   ipratropium - albuterol 0.5 mg/2.5 mg/3 mL (DUONEB) neb solution 3 mL (3 mLs Nebulization $Given 5/30/24 133)     Significant improvement in wheezing, shortness of breath, cough and chest tightness after neb    Assessments & Plan (with Medical Decision Making)     I have reviewed the nursing notes.    I have reviewed the findings, diagnosis, plan and need for follow up with the patient.    Medical Decision Making  The  patient's presentation was of straightforward complexity (a clearly self-limited or minor problem).    The patient's evaluation involved:  ordering and/or review of 1 test(s) in this encounter (see separate area of note for details)    The patient's management necessitated moderate risk (prescription drug management including medications given in the ED).        Discharge Medication List as of 5/30/2024  2:19 PM        START taking these medications    Details   ipratropium-albuterol (COMBIVENT RESPIMAT)  MCG/ACT inhaler Inhale 1 puff into the lungs 4 times daily as needed for shortness of breath, wheezing or cough, Disp-4 g, R-0, E-Prescribe      montelukast (SINGULAIR) 10 MG tablet Take 1 tablet (10 mg) by mouth at bedtime, Disp-30 tablet, R-0, E-Prescribe      predniSONE (DELTASONE) 10 MG tablet Take 3 tablets (30 mg) by mouth daily for 5 days, Disp-15 tablet, R-0, E-Prescribe             Final diagnoses:   Viral URI with cough   Wheezing     Meds as prescribed  Risks of oral steroid use were discussed and include psychiatric/mood changes, insomnia, stomach ulcers and potential GI bleeding, blood sugar elevation/worsening diabetes, hip/bone necrosis or bone demineralization.      Discussed black box warning for Singular re: changes in mental health status, insomnia, agitation etc.     To the ER with worsening of symptoms or new concerns  To PCP if symptoms persist    5/30/2024   [facility]       Darya Carrillo NP  05/30/24 [id number]

## 2024-05-30 NOTE — LETTER
May 30, 2024      To Whom It May Concern:      Kayley Pak was seen in our Emergency Department today, 05/30/24.  I expect her condition to improve over the next 1-4 days.  She may return to work when improved.    Sincerely,        Darya Carrillo, NP

## 2024-05-30 NOTE — ED TRIAGE NOTES
Pt presents with concerns of SOB and cough pt reports being seen 2 days ago. Prescribed antibiotics.  Pt has been taking cefdinir but had not been talking the metronidazole. Due to misunderstanding of medication directions.

## 2024-10-11 NOTE — TELEPHONE ENCOUNTER
Pt is calling to cancel her surgery for 1/10/24 with Dr. Mendenhall due to URI. I told pt to call when she feels better and we will get it scheduled for 2 weeks from then. I did tell pt she might need to have another pre-op done if over 30 days.  Diane Jim, KEELY    
Yes

## 2024-10-18 DIAGNOSIS — F41.1 GAD (GENERALIZED ANXIETY DISORDER): ICD-10-CM

## 2024-10-18 DIAGNOSIS — I10 ESSENTIAL HYPERTENSION: ICD-10-CM

## 2024-10-18 RX ORDER — PROPRANOLOL HYDROCHLORIDE 160 MG/1
160 CAPSULE, EXTENDED RELEASE ORAL DAILY
Qty: 90 CAPSULE | Refills: 0 | Status: SHIPPED | OUTPATIENT
Start: 2024-10-18

## 2024-10-18 RX ORDER — DULOXETIN HYDROCHLORIDE 30 MG/1
30 CAPSULE, DELAYED RELEASE ORAL DAILY
Qty: 90 CAPSULE | Refills: 0 | Status: SHIPPED | OUTPATIENT
Start: 2024-10-18

## 2024-10-18 NOTE — TELEPHONE ENCOUNTER
Cymbalta  Last Written Prescription Date: 10/26/23  Last Fill Quantity: 90 # of Refills: 3  Last Office Visit: 4/10/24    Propranolol  Last Written Prescription Date: 10/26/23  Last Fill Quantity: 90 # of Refills: 3  Last Office Visit: 4/10/24

## 2024-12-22 ENCOUNTER — HEALTH MAINTENANCE LETTER (OUTPATIENT)
Age: 44
End: 2024-12-22

## 2025-08-30 ENCOUNTER — HOSPITAL ENCOUNTER (EMERGENCY)
Facility: HOSPITAL | Age: 45
Discharge: HOME | End: 2025-08-30
Attending: NURSE PRACTITIONER | Admitting: NURSE PRACTITIONER
Payer: COMMERCIAL

## 2025-08-30 VITALS
OXYGEN SATURATION: 97 % | HEART RATE: 87 BPM | RESPIRATION RATE: 16 BRPM | TEMPERATURE: 99 F | SYSTOLIC BLOOD PRESSURE: 182 MMHG | DIASTOLIC BLOOD PRESSURE: 122 MMHG

## 2025-08-30 DIAGNOSIS — K08.89 PAIN, DENTAL: Primary | ICD-10-CM

## 2025-08-30 PROCEDURE — 250N000011 HC RX IP 250 OP 636: Performed by: NURSE PRACTITIONER

## 2025-08-30 PROCEDURE — 250N000013 HC RX MED GY IP 250 OP 250 PS 637: Performed by: NURSE PRACTITIONER

## 2025-08-30 PROCEDURE — G0463 HOSPITAL OUTPT CLINIC VISIT: HCPCS | Mod: 25 | Performed by: NURSE PRACTITIONER

## 2025-08-30 PROCEDURE — 99213 OFFICE O/P EST LOW 20 MIN: CPT | Performed by: NURSE PRACTITIONER

## 2025-08-30 PROCEDURE — 96372 THER/PROPH/DIAG INJ SC/IM: CPT | Performed by: NURSE PRACTITIONER

## 2025-08-30 PROCEDURE — G0463 HOSPITAL OUTPT CLINIC VISIT: HCPCS | Performed by: NURSE PRACTITIONER

## 2025-08-30 RX ORDER — CHLORHEXIDINE GLUCONATE ORAL RINSE 1.2 MG/ML
15 SOLUTION DENTAL 2 TIMES DAILY
Qty: 210 ML | Refills: 0 | Status: SHIPPED | OUTPATIENT
Start: 2025-08-30 | End: 2025-09-06

## 2025-08-30 RX ORDER — HYDROCODONE BITARTRATE AND ACETAMINOPHEN 5; 325 MG/1; MG/1
1 TABLET ORAL ONCE
Refills: 0 | Status: COMPLETED | OUTPATIENT
Start: 2025-08-30 | End: 2025-08-30

## 2025-08-30 RX ORDER — KETOROLAC TROMETHAMINE 30 MG/ML
30 INJECTION, SOLUTION INTRAMUSCULAR; INTRAVENOUS ONCE
Status: COMPLETED | OUTPATIENT
Start: 2025-08-30 | End: 2025-08-30

## 2025-08-30 RX ORDER — KETOROLAC TROMETHAMINE 30 MG/ML
30 INJECTION, SOLUTION INTRAMUSCULAR; INTRAVENOUS ONCE
Status: DISCONTINUED | OUTPATIENT
Start: 2025-08-30 | End: 2025-08-30

## 2025-08-30 RX ORDER — CEFDINIR 300 MG/1
300 CAPSULE ORAL 2 TIMES DAILY
Qty: 14 CAPSULE | Refills: 0 | Status: SHIPPED | OUTPATIENT
Start: 2025-08-30 | End: 2025-09-06

## 2025-08-30 RX ORDER — METRONIDAZOLE 500 MG/1
500 TABLET ORAL 3 TIMES DAILY
Qty: 21 TABLET | Refills: 0 | Status: SHIPPED | OUTPATIENT
Start: 2025-08-30 | End: 2025-09-06

## 2025-08-30 RX ADMIN — KETOROLAC TROMETHAMINE 30 MG: 30 INJECTION, SOLUTION INTRAMUSCULAR at 11:15

## 2025-08-30 RX ADMIN — HYDROCODONE BITARTRATE AND ACETAMINOPHEN 1 TABLET: 5; 325 TABLET ORAL at 11:12

## 2025-08-30 ASSESSMENT — ENCOUNTER SYMPTOMS
FACIAL SWELLING: 1
HEADACHES: 0
NAUSEA: 0
TROUBLE SWALLOWING: 0
FEVER: 0
DIARRHEA: 0
EYES NEGATIVE: 1
ABDOMINAL PAIN: 0
NECK STIFFNESS: 0
VOMITING: 0
NECK PAIN: 0
PSYCHIATRIC NEGATIVE: 1
SHORTNESS OF BREATH: 0
CHILLS: 0
MYALGIAS: 0

## 2025-08-30 ASSESSMENT — ACTIVITIES OF DAILY LIVING (ADL): ADLS_ACUITY_SCORE: 41

## (undated) DEVICE — PACK GYN CYSTO CUSTOM SMA32GCMBF

## (undated) DEVICE — UTERINE MANIPULATOR HUMI KRONER 6003

## (undated) DEVICE — TUBING SUCTION 20FT N620A

## (undated) DEVICE — SOL WATER IRRIG 1000ML BOTTLE 2F7114

## (undated) DEVICE — TRAY PREP DRY SKIN SCRUB 067

## (undated) DEVICE — PREP CHLORAPREP 26ML TINTED HI-LITE ORANGE 930815

## (undated) DEVICE — SYSTEM CLEARIFY VISUALIZATION 21-345

## (undated) DEVICE — KIT PATIENT POSITIONING PIGAZZI LATEX FREE 40580

## (undated) DEVICE — ENDO TROCAR SHIELDED BLADED KII ADV FIX 05X100MM CFB03

## (undated) DEVICE — ESU GROUND PAD ADULT W/CORD E7507

## (undated) DEVICE — LABEL STERILE PREPRINTED FOR OR FRRH01-2M

## (undated) DEVICE — TUBING INSUFFLATION INSUFFLATOR FILTER HIGH FLOW 031322-01

## (undated) DEVICE — ENDO SEAL SCOPE SELF SEAL 1.2MM 26153EAU

## (undated) DEVICE — SOL NACL 0.9% IRRIG 1000ML BOTTLE 2F7124

## (undated) DEVICE — PACK BASIN SET UP SUTCNBSBBA

## (undated) DEVICE — SLEEVE SCD EXPRESS KNEE LENGTH MED 9529

## (undated) DEVICE — ENDO TROCAR OPTICAL ACCESS KII Z-THRD 08X100MM C0Q19

## (undated) DEVICE — PAD PERI INDIV WRAP 11" 2022A

## (undated) DEVICE — COVER LT HANDLE 2/PK 5160-2FG

## (undated) DEVICE — GLV 8.5 BIOGEL LATEX 30485-01

## (undated) DEVICE — NDL INSUFFLATION 13GA 150MM C2202

## (undated) DEVICE — SOL NACL 0.9% INJ 1000ML BAG 2B1324X

## (undated) DEVICE — PRESSURE INFUSOR DISP. 3000CC 233000

## (undated) DEVICE — BLADE CLIPPER SGL USE 9680

## (undated) DEVICE — TUBING INFLOW HYSTEROPUMP 4170.223

## (undated) DEVICE — SU VICRYL 0 UR-6 27" J603H

## (undated) DEVICE — CATH SELF CATH MALE 14FR 414

## (undated) DEVICE — SOL NACL 0.9% IRRIG 3000ML BAG 2B7477

## (undated) DEVICE — SET TUBING OUTFLOW FLUID MANAGER STRL DISP 4170.901

## (undated) DEVICE — PACK LAP LAVH CUSTOM SMA32LPMBG

## (undated) DEVICE — ENDO TROCAR SLEEVE KII ADV FIXATION 05X100MM CFS02

## (undated) DEVICE — ESU LIGASURE MARYLAND LAPAROSCOPIC SLR/DVDR 5MMX37CM LF1937

## (undated) DEVICE — SU DERMABOND ADVANCED .7ML DNX12

## (undated) DEVICE — CANISTER SUCTION MEDI-VAC GUARDIAN 2000ML 90D 65651-220

## (undated) DEVICE — DRSG NON ADHERING 3 X 8 TELFA 1238

## (undated) DEVICE — EVAC SYSTEM CLEAR FLOW SC082500

## (undated) DEVICE — NDL INSUFFLATION 13GA 120MM C2201

## (undated) DEVICE — BIN-UROLOGY / CYSTO BN47

## (undated) RX ORDER — FENTANYL CITRATE-0.9 % NACL/PF 10 MCG/ML
PLASTIC BAG, INJECTION (ML) INTRAVENOUS
Status: DISPENSED
Start: 2024-02-21

## (undated) RX ORDER — PROPOFOL 10 MG/ML
INJECTION, EMULSION INTRAVENOUS
Status: DISPENSED
Start: 2024-02-21

## (undated) RX ORDER — DEXAMETHASONE SODIUM PHOSPHATE 10 MG/ML
INJECTION, SOLUTION INTRAMUSCULAR; INTRAVENOUS
Status: DISPENSED
Start: 2024-02-21

## (undated) RX ORDER — TRANEXAMIC ACID 100 MG/ML
INJECTION, SOLUTION INTRAVENOUS
Status: DISPENSED
Start: 2024-02-21

## (undated) RX ORDER — FENTANYL CITRATE 50 UG/ML
INJECTION, SOLUTION INTRAMUSCULAR; INTRAVENOUS
Status: DISPENSED
Start: 2024-02-21

## (undated) RX ORDER — ONDANSETRON 2 MG/ML
INJECTION INTRAMUSCULAR; INTRAVENOUS
Status: DISPENSED
Start: 2024-02-21